# Patient Record
Sex: MALE | Race: WHITE | NOT HISPANIC OR LATINO | Employment: OTHER | URBAN - METROPOLITAN AREA
[De-identification: names, ages, dates, MRNs, and addresses within clinical notes are randomized per-mention and may not be internally consistent; named-entity substitution may affect disease eponyms.]

---

## 2019-01-01 ENCOUNTER — APPOINTMENT (OUTPATIENT)
Dept: RADIOLOGY | Facility: MEDICAL CENTER | Age: 69
DRG: 253 | End: 2019-01-01
Attending: HOSPITALIST
Payer: MEDICARE

## 2019-01-01 ENCOUNTER — APPOINTMENT (OUTPATIENT)
Dept: RADIOLOGY | Facility: MEDICAL CENTER | Age: 69
DRG: 253 | End: 2019-01-01
Attending: EMERGENCY MEDICINE
Payer: MEDICARE

## 2019-01-01 ENCOUNTER — HOSPITAL ENCOUNTER (INPATIENT)
Facility: MEDICAL CENTER | Age: 69
LOS: 8 days | DRG: 253 | End: 2020-01-03
Attending: EMERGENCY MEDICINE | Admitting: HOSPITALIST
Payer: MEDICARE

## 2019-01-01 ENCOUNTER — APPOINTMENT (OUTPATIENT)
Dept: RADIOLOGY | Facility: MEDICAL CENTER | Age: 69
DRG: 253 | End: 2019-01-01
Attending: SURGERY
Payer: MEDICARE

## 2019-01-01 ENCOUNTER — ANESTHESIA EVENT (OUTPATIENT)
Dept: SURGERY | Facility: MEDICAL CENTER | Age: 69
DRG: 253 | End: 2019-01-01
Payer: MEDICARE

## 2019-01-01 ENCOUNTER — ANESTHESIA (OUTPATIENT)
Dept: SURGERY | Facility: MEDICAL CENTER | Age: 69
DRG: 253 | End: 2019-01-01
Payer: MEDICARE

## 2019-01-01 DIAGNOSIS — I70.90 ARTERIAL OCCLUSION DUE TO ARTERIOSCLEROSIS: ICD-10-CM

## 2019-01-01 DIAGNOSIS — E87.1 HYPONATREMIA: ICD-10-CM

## 2019-01-01 DIAGNOSIS — N19 RENAL FAILURE, UNSPECIFIED CHRONICITY: ICD-10-CM

## 2019-01-01 DIAGNOSIS — I73.9 PVD (PERIPHERAL VASCULAR DISEASE) (HCC): ICD-10-CM

## 2019-01-01 DIAGNOSIS — I70.229 CRITICAL LOWER LIMB ISCHEMIA (HCC): ICD-10-CM

## 2019-01-01 DIAGNOSIS — I70.90 ARTERIAL OCCLUSION: ICD-10-CM

## 2019-01-01 LAB
ALBUMIN SERPL BCP-MCNC: 3.1 G/DL (ref 3.2–4.9)
ALBUMIN SERPL BCP-MCNC: 3.2 G/DL (ref 3.2–4.9)
ALBUMIN SERPL BCP-MCNC: 3.4 G/DL (ref 3.2–4.9)
ALBUMIN SERPL BCP-MCNC: 4.2 G/DL (ref 3.2–4.9)
ALBUMIN/GLOB SERPL: 1 G/DL
ALBUMIN/GLOB SERPL: 1.1 G/DL
ALP SERPL-CCNC: 107 U/L (ref 30–99)
ALP SERPL-CCNC: 119 U/L (ref 30–99)
ALP SERPL-CCNC: 82 U/L (ref 30–99)
ALP SERPL-CCNC: 93 U/L (ref 30–99)
ALT SERPL-CCNC: 6 U/L (ref 2–50)
ALT SERPL-CCNC: 7 U/L (ref 2–50)
ALT SERPL-CCNC: 7 U/L (ref 2–50)
ALT SERPL-CCNC: 8 U/L (ref 2–50)
ANION GAP SERPL CALC-SCNC: 14 MMOL/L (ref 0–11.9)
ANION GAP SERPL CALC-SCNC: 5 MMOL/L (ref 0–11.9)
ANION GAP SERPL CALC-SCNC: 5 MMOL/L (ref 0–11.9)
ANION GAP SERPL CALC-SCNC: 7 MMOL/L (ref 0–11.9)
ANION GAP SERPL CALC-SCNC: 8 MMOL/L (ref 0–11.9)
ANION GAP SERPL CALC-SCNC: 9 MMOL/L (ref 0–11.9)
APPEARANCE UR: CLEAR
APTT PPP: 170.6 SEC (ref 24.7–36)
APTT PPP: 171.8 SEC (ref 24.7–36)
APTT PPP: 42 SEC (ref 24.7–36)
AST SERPL-CCNC: 12 U/L (ref 12–45)
AST SERPL-CCNC: 14 U/L (ref 12–45)
AST SERPL-CCNC: 14 U/L (ref 12–45)
AST SERPL-CCNC: 15 U/L (ref 12–45)
BACTERIA #/AREA URNS HPF: NEGATIVE /HPF
BASOPHILS # BLD AUTO: 0.2 % (ref 0–1.8)
BASOPHILS # BLD AUTO: 0.6 % (ref 0–1.8)
BASOPHILS # BLD AUTO: 0.6 % (ref 0–1.8)
BASOPHILS # BLD: 0.03 K/UL (ref 0–0.12)
BASOPHILS # BLD: 0.04 K/UL (ref 0–0.12)
BASOPHILS # BLD: 0.07 K/UL (ref 0–0.12)
BILIRUB SERPL-MCNC: 0.3 MG/DL (ref 0.1–1.5)
BILIRUB SERPL-MCNC: 0.4 MG/DL (ref 0.1–1.5)
BILIRUB SERPL-MCNC: 0.5 MG/DL (ref 0.1–1.5)
BILIRUB SERPL-MCNC: 0.5 MG/DL (ref 0.1–1.5)
BILIRUB UR QL STRIP.AUTO: NEGATIVE
BUN SERPL-MCNC: 15 MG/DL (ref 8–22)
BUN SERPL-MCNC: 19 MG/DL (ref 8–22)
BUN SERPL-MCNC: 29 MG/DL (ref 8–22)
BUN SERPL-MCNC: 42 MG/DL (ref 8–22)
BUN SERPL-MCNC: 55 MG/DL (ref 8–22)
BUN SERPL-MCNC: 60 MG/DL (ref 8–22)
CALCIUM SERPL-MCNC: 8 MG/DL (ref 8.5–10.5)
CALCIUM SERPL-MCNC: 8.4 MG/DL (ref 8.5–10.5)
CALCIUM SERPL-MCNC: 8.5 MG/DL (ref 8.5–10.5)
CALCIUM SERPL-MCNC: 8.6 MG/DL (ref 8.5–10.5)
CALCIUM SERPL-MCNC: 8.6 MG/DL (ref 8.5–10.5)
CALCIUM SERPL-MCNC: 9.3 MG/DL (ref 8.5–10.5)
CHLORIDE SERPL-SCNC: 101 MMOL/L (ref 96–112)
CHLORIDE SERPL-SCNC: 105 MMOL/L (ref 96–112)
CHLORIDE SERPL-SCNC: 107 MMOL/L (ref 96–112)
CHLORIDE SERPL-SCNC: 107 MMOL/L (ref 96–112)
CHLORIDE SERPL-SCNC: 95 MMOL/L (ref 96–112)
CHLORIDE SERPL-SCNC: 99 MMOL/L (ref 96–112)
CHOLEST SERPL-MCNC: 87 MG/DL (ref 100–199)
CK SERPL-CCNC: 86 U/L (ref 0–154)
CO2 SERPL-SCNC: 16 MMOL/L (ref 20–33)
CO2 SERPL-SCNC: 22 MMOL/L (ref 20–33)
CO2 SERPL-SCNC: 23 MMOL/L (ref 20–33)
CO2 SERPL-SCNC: 24 MMOL/L (ref 20–33)
COLOR UR: YELLOW
CREAT SERPL-MCNC: 1.42 MG/DL (ref 0.5–1.4)
CREAT SERPL-MCNC: 1.49 MG/DL (ref 0.5–1.4)
CREAT SERPL-MCNC: 1.62 MG/DL (ref 0.5–1.4)
CREAT SERPL-MCNC: 2.06 MG/DL (ref 0.5–1.4)
CREAT SERPL-MCNC: 2.58 MG/DL (ref 0.5–1.4)
CREAT SERPL-MCNC: 2.99 MG/DL (ref 0.5–1.4)
CREAT UR-MCNC: 93.6 MG/DL
CRP SERPL HS-MCNC: 3.33 MG/DL (ref 0–0.75)
EKG IMPRESSION: NORMAL
EOSINOPHIL # BLD AUTO: 0.02 K/UL (ref 0–0.51)
EOSINOPHIL # BLD AUTO: 0.08 K/UL (ref 0–0.51)
EOSINOPHIL # BLD AUTO: 0.28 K/UL (ref 0–0.51)
EOSINOPHIL NFR BLD: 0.3 % (ref 0–6.9)
EOSINOPHIL NFR BLD: 1.3 % (ref 0–6.9)
EOSINOPHIL NFR BLD: 2.4 % (ref 0–6.9)
EPI CELLS #/AREA URNS HPF: NEGATIVE /HPF
ERYTHROCYTE [DISTWIDTH] IN BLOOD BY AUTOMATED COUNT: 44.6 FL (ref 35.9–50)
ERYTHROCYTE [DISTWIDTH] IN BLOOD BY AUTOMATED COUNT: 46.3 FL (ref 35.9–50)
ERYTHROCYTE [DISTWIDTH] IN BLOOD BY AUTOMATED COUNT: 46.5 FL (ref 35.9–50)
ERYTHROCYTE [DISTWIDTH] IN BLOOD BY AUTOMATED COUNT: 46.8 FL (ref 35.9–50)
ERYTHROCYTE [DISTWIDTH] IN BLOOD BY AUTOMATED COUNT: 46.8 FL (ref 35.9–50)
ERYTHROCYTE [DISTWIDTH] IN BLOOD BY AUTOMATED COUNT: 47.2 FL (ref 35.9–50)
ERYTHROCYTE [DISTWIDTH] IN BLOOD BY AUTOMATED COUNT: 48.6 FL (ref 35.9–50)
ERYTHROCYTE [SEDIMENTATION RATE] IN BLOOD BY WESTERGREN METHOD: 67 MM/HOUR (ref 0–20)
GLOBULIN SER CALC-MCNC: 2.9 G/DL (ref 1.9–3.5)
GLOBULIN SER CALC-MCNC: 3.1 G/DL (ref 1.9–3.5)
GLOBULIN SER CALC-MCNC: 3.1 G/DL (ref 1.9–3.5)
GLOBULIN SER CALC-MCNC: 4 G/DL (ref 1.9–3.5)
GLUCOSE BLD-MCNC: 82 MG/DL (ref 65–99)
GLUCOSE SERPL-MCNC: 124 MG/DL (ref 65–99)
GLUCOSE SERPL-MCNC: 347 MG/DL (ref 65–99)
GLUCOSE SERPL-MCNC: 83 MG/DL (ref 65–99)
GLUCOSE SERPL-MCNC: 85 MG/DL (ref 65–99)
GLUCOSE SERPL-MCNC: 85 MG/DL (ref 65–99)
GLUCOSE SERPL-MCNC: 92 MG/DL (ref 65–99)
GLUCOSE UR STRIP.AUTO-MCNC: NEGATIVE MG/DL
HCT VFR BLD AUTO: 27.4 % (ref 42–52)
HCT VFR BLD AUTO: 27.4 % (ref 42–52)
HCT VFR BLD AUTO: 30.1 % (ref 42–52)
HCT VFR BLD AUTO: 31.1 % (ref 42–52)
HCT VFR BLD AUTO: 31.1 % (ref 42–52)
HCT VFR BLD AUTO: 32.5 % (ref 42–52)
HCT VFR BLD AUTO: 39.6 % (ref 42–52)
HDLC SERPL-MCNC: 26 MG/DL
HGB BLD-MCNC: 10.3 G/DL (ref 14–18)
HGB BLD-MCNC: 10.6 G/DL (ref 14–18)
HGB BLD-MCNC: 10.6 G/DL (ref 14–18)
HGB BLD-MCNC: 13.3 G/DL (ref 14–18)
HGB BLD-MCNC: 8.8 G/DL (ref 14–18)
HGB BLD-MCNC: 9.2 G/DL (ref 14–18)
HGB BLD-MCNC: 9.6 G/DL (ref 14–18)
HYALINE CASTS #/AREA URNS LPF: ABNORMAL /LPF
IMM GRANULOCYTES # BLD AUTO: 0.01 K/UL (ref 0–0.11)
IMM GRANULOCYTES # BLD AUTO: 0.01 K/UL (ref 0–0.11)
IMM GRANULOCYTES # BLD AUTO: 0.04 K/UL (ref 0–0.11)
IMM GRANULOCYTES NFR BLD AUTO: 0 % (ref 0–0.9)
IMM GRANULOCYTES NFR BLD AUTO: 0.2 % (ref 0–0.9)
IMM GRANULOCYTES NFR BLD AUTO: 0.3 % (ref 0–0.9)
INR PPP: 1 (ref 0.87–1.13)
KETONES UR STRIP.AUTO-MCNC: ABNORMAL MG/DL
LDLC SERPL CALC-MCNC: 41 MG/DL
LEUKOCYTE ESTERASE UR QL STRIP.AUTO: NEGATIVE
LYMPHOCYTES # BLD AUTO: 1.06 K/UL (ref 1–4.8)
LYMPHOCYTES # BLD AUTO: 1.39 K/UL (ref 1–4.8)
LYMPHOCYTES # BLD AUTO: 1.93 K/UL (ref 1–4.8)
LYMPHOCYTES NFR BLD: 16.5 % (ref 22–41)
LYMPHOCYTES NFR BLD: 17.8 % (ref 22–41)
LYMPHOCYTES NFR BLD: 22 % (ref 22–41)
MAGNESIUM SERPL-MCNC: 2.3 MG/DL (ref 1.5–2.5)
MCH RBC QN AUTO: 30.1 PG (ref 27–33)
MCH RBC QN AUTO: 30.3 PG (ref 27–33)
MCH RBC QN AUTO: 30.4 PG (ref 27–33)
MCH RBC QN AUTO: 30.6 PG (ref 27–33)
MCH RBC QN AUTO: 30.8 PG (ref 27–33)
MCHC RBC AUTO-ENTMCNC: 31.9 G/DL (ref 33.7–35.3)
MCHC RBC AUTO-ENTMCNC: 32.1 G/DL (ref 33.7–35.3)
MCHC RBC AUTO-ENTMCNC: 32.6 G/DL (ref 33.7–35.3)
MCHC RBC AUTO-ENTMCNC: 33.1 G/DL (ref 33.7–35.3)
MCHC RBC AUTO-ENTMCNC: 33.6 G/DL (ref 33.7–35.3)
MCHC RBC AUTO-ENTMCNC: 33.6 G/DL (ref 33.7–35.3)
MCHC RBC AUTO-ENTMCNC: 34.1 G/DL (ref 33.7–35.3)
MCV RBC AUTO: 89.9 FL (ref 81.4–97.8)
MCV RBC AUTO: 90.6 FL (ref 81.4–97.8)
MCV RBC AUTO: 91.5 FL (ref 81.4–97.8)
MCV RBC AUTO: 91.6 FL (ref 81.4–97.8)
MCV RBC AUTO: 92.3 FL (ref 81.4–97.8)
MCV RBC AUTO: 94.8 FL (ref 81.4–97.8)
MCV RBC AUTO: 95.3 FL (ref 81.4–97.8)
MICRO URNS: ABNORMAL
MONOCYTES # BLD AUTO: 0.63 K/UL (ref 0–0.85)
MONOCYTES # BLD AUTO: 0.81 K/UL (ref 0–0.85)
MONOCYTES # BLD AUTO: 1.19 K/UL (ref 0–0.85)
MONOCYTES NFR BLD AUTO: 10.2 % (ref 0–13.4)
MONOCYTES NFR BLD AUTO: 10.6 % (ref 0–13.4)
MONOCYTES NFR BLD AUTO: 12.8 % (ref 0–13.4)
NEUTROPHILS # BLD AUTO: 3.98 K/UL (ref 1.82–7.42)
NEUTROPHILS # BLD AUTO: 4.19 K/UL (ref 1.82–7.42)
NEUTROPHILS # BLD AUTO: 8.17 K/UL (ref 1.82–7.42)
NEUTROPHILS NFR BLD: 63.1 % (ref 44–72)
NEUTROPHILS NFR BLD: 70 % (ref 44–72)
NEUTROPHILS NFR BLD: 70.6 % (ref 44–72)
NITRITE UR QL STRIP.AUTO: NEGATIVE
NRBC # BLD AUTO: 0 K/UL
NRBC BLD-RTO: 0 /100 WBC
PH UR STRIP.AUTO: 5 [PH] (ref 5–8)
PHOSPHATE SERPL-MCNC: 5.1 MG/DL (ref 2.5–4.5)
PLATELET # BLD AUTO: 182 K/UL (ref 164–446)
PLATELET # BLD AUTO: 192 K/UL (ref 164–446)
PLATELET # BLD AUTO: 196 K/UL (ref 164–446)
PLATELET # BLD AUTO: 203 K/UL (ref 164–446)
PLATELET # BLD AUTO: 204 K/UL (ref 164–446)
PLATELET # BLD AUTO: 207 K/UL (ref 164–446)
PLATELET # BLD AUTO: 332 K/UL (ref 164–446)
PMV BLD AUTO: 10 FL (ref 9–12.9)
PMV BLD AUTO: 10 FL (ref 9–12.9)
PMV BLD AUTO: 10.1 FL (ref 9–12.9)
PMV BLD AUTO: 10.2 FL (ref 9–12.9)
PMV BLD AUTO: 10.2 FL (ref 9–12.9)
PMV BLD AUTO: 10.3 FL (ref 9–12.9)
PMV BLD AUTO: 10.4 FL (ref 9–12.9)
POTASSIUM SERPL-SCNC: 4.1 MMOL/L (ref 3.6–5.5)
POTASSIUM SERPL-SCNC: 4.1 MMOL/L (ref 3.6–5.5)
POTASSIUM SERPL-SCNC: 4.2 MMOL/L (ref 3.6–5.5)
POTASSIUM SERPL-SCNC: 4.3 MMOL/L (ref 3.6–5.5)
POTASSIUM SERPL-SCNC: 4.3 MMOL/L (ref 3.6–5.5)
POTASSIUM SERPL-SCNC: 5 MMOL/L (ref 3.6–5.5)
PROT SERPL-MCNC: 6.1 G/DL (ref 6–8.2)
PROT SERPL-MCNC: 6.2 G/DL (ref 6–8.2)
PROT SERPL-MCNC: 6.5 G/DL (ref 6–8.2)
PROT SERPL-MCNC: 8.2 G/DL (ref 6–8.2)
PROT UR QL STRIP: 100 MG/DL
PROTHROMBIN TIME: 13.4 SEC (ref 12–14.6)
RBC # BLD AUTO: 2.89 M/UL (ref 4.7–6.1)
RBC # BLD AUTO: 2.99 M/UL (ref 4.7–6.1)
RBC # BLD AUTO: 3.16 M/UL (ref 4.7–6.1)
RBC # BLD AUTO: 3.4 M/UL (ref 4.7–6.1)
RBC # BLD AUTO: 3.46 M/UL (ref 4.7–6.1)
RBC # BLD AUTO: 3.52 M/UL (ref 4.7–6.1)
RBC # BLD AUTO: 4.37 M/UL (ref 4.7–6.1)
RBC # URNS HPF: ABNORMAL /HPF
RBC UR QL AUTO: NEGATIVE
SODIUM SERPL-SCNC: 126 MMOL/L (ref 135–145)
SODIUM SERPL-SCNC: 129 MMOL/L (ref 135–145)
SODIUM SERPL-SCNC: 132 MMOL/L (ref 135–145)
SODIUM SERPL-SCNC: 133 MMOL/L (ref 135–145)
SODIUM SERPL-SCNC: 136 MMOL/L (ref 135–145)
SODIUM SERPL-SCNC: 137 MMOL/L (ref 135–145)
SODIUM UR-SCNC: 56 MMOL/L
SP GR UR STRIP.AUTO: 1.01
TRIGL SERPL-MCNC: 98 MG/DL (ref 0–149)
URATE SERPL-MCNC: 8 MG/DL (ref 2.5–8.3)
UROBILINOGEN UR STRIP.AUTO-MCNC: 1 MG/DL
WBC # BLD AUTO: 11.7 K/UL (ref 4.8–10.8)
WBC # BLD AUTO: 5.4 K/UL (ref 4.8–10.8)
WBC # BLD AUTO: 5.8 K/UL (ref 4.8–10.8)
WBC # BLD AUTO: 5.9 K/UL (ref 4.8–10.8)
WBC # BLD AUTO: 6.1 K/UL (ref 4.8–10.8)
WBC # BLD AUTO: 6.3 K/UL (ref 4.8–10.8)
WBC # BLD AUTO: 7.3 K/UL (ref 4.8–10.8)
WBC #/AREA URNS HPF: ABNORMAL /HPF

## 2019-01-01 PROCEDURE — 700102 HCHG RX REV CODE 250 W/ 637 OVERRIDE(OP): Performed by: HOSPITALIST

## 2019-01-01 PROCEDURE — 04CL0ZZ EXTIRPATION OF MATTER FROM LEFT FEMORAL ARTERY, OPEN APPROACH: ICD-10-PCS | Performed by: SURGERY

## 2019-01-01 PROCEDURE — A9270 NON-COVERED ITEM OR SERVICE: HCPCS | Performed by: NURSE PRACTITIONER

## 2019-01-01 PROCEDURE — 700111 HCHG RX REV CODE 636 W/ 250 OVERRIDE (IP): Performed by: HOSPITALIST

## 2019-01-01 PROCEDURE — 700117 HCHG RX CONTRAST REV CODE 255: Performed by: SURGERY

## 2019-01-01 PROCEDURE — 700102 HCHG RX REV CODE 250 W/ 637 OVERRIDE(OP): Performed by: NURSE PRACTITIONER

## 2019-01-01 PROCEDURE — 85027 COMPLETE CBC AUTOMATED: CPT

## 2019-01-01 PROCEDURE — 160036 HCHG PACU - EA ADDL 30 MINS PHASE I: Performed by: SURGERY

## 2019-01-01 PROCEDURE — 501445 HCHG STAPLER, SKIN DISP: Performed by: SURGERY

## 2019-01-01 PROCEDURE — C1769 GUIDE WIRE: HCPCS | Performed by: SURGERY

## 2019-01-01 PROCEDURE — C1894 INTRO/SHEATH, NON-LASER: HCPCS | Performed by: SURGERY

## 2019-01-01 PROCEDURE — 99223 1ST HOSP IP/OBS HIGH 75: CPT | Mod: AI | Performed by: HOSPITALIST

## 2019-01-01 PROCEDURE — 99232 SBSQ HOSP IP/OBS MODERATE 35: CPT | Performed by: INTERNAL MEDICINE

## 2019-01-01 PROCEDURE — C1725 CATH, TRANSLUMIN NON-LASER: HCPCS | Performed by: SURGERY

## 2019-01-01 PROCEDURE — A9270 NON-COVERED ITEM OR SERVICE: HCPCS | Performed by: HOSPITALIST

## 2019-01-01 PROCEDURE — 85610 PROTHROMBIN TIME: CPT

## 2019-01-01 PROCEDURE — 700105 HCHG RX REV CODE 258: Performed by: HOSPITALIST

## 2019-01-01 PROCEDURE — 76775 US EXAM ABDO BACK WALL LIM: CPT

## 2019-01-01 PROCEDURE — 160035 HCHG PACU - 1ST 60 MINS PHASE I: Performed by: SURGERY

## 2019-01-01 PROCEDURE — 36415 COLL VENOUS BLD VENIPUNCTURE: CPT

## 2019-01-01 PROCEDURE — C1757 CATH, THROMBECTOMY/EMBOLECT: HCPCS | Performed by: SURGERY

## 2019-01-01 PROCEDURE — 160009 HCHG ANES TIME/MIN: Performed by: SURGERY

## 2019-01-01 PROCEDURE — 84100 ASSAY OF PHOSPHORUS: CPT

## 2019-01-01 PROCEDURE — 93010 ELECTROCARDIOGRAM REPORT: CPT | Performed by: INTERNAL MEDICINE

## 2019-01-01 PROCEDURE — 82570 ASSAY OF URINE CREATININE: CPT

## 2019-01-01 PROCEDURE — 82962 GLUCOSE BLOOD TEST: CPT

## 2019-01-01 PROCEDURE — 502240 HCHG MISC OR SUPPLY RC 0272: Performed by: SURGERY

## 2019-01-01 PROCEDURE — 80048 BASIC METABOLIC PNL TOTAL CA: CPT

## 2019-01-01 PROCEDURE — 93922 UPR/L XTREMITY ART 2 LEVELS: CPT

## 2019-01-01 PROCEDURE — B41C1ZZ FLUOROSCOPY OF PELVIC ARTERIES USING LOW OSMOLAR CONTRAST: ICD-10-PCS | Performed by: SURGERY

## 2019-01-01 PROCEDURE — 80053 COMPREHEN METABOLIC PANEL: CPT

## 2019-01-01 PROCEDURE — 700111 HCHG RX REV CODE 636 W/ 250 OVERRIDE (IP): Performed by: ANESTHESIOLOGY

## 2019-01-01 PROCEDURE — 700102 HCHG RX REV CODE 250 W/ 637 OVERRIDE(OP): Performed by: INTERNAL MEDICINE

## 2019-01-01 PROCEDURE — 700105 HCHG RX REV CODE 258: Performed by: INTERNAL MEDICINE

## 2019-01-01 PROCEDURE — 700101 HCHG RX REV CODE 250: Performed by: HOSPITALIST

## 2019-01-01 PROCEDURE — 84550 ASSAY OF BLOOD/URIC ACID: CPT

## 2019-01-01 PROCEDURE — 86140 C-REACTIVE PROTEIN: CPT

## 2019-01-01 PROCEDURE — C1768 GRAFT, VASCULAR: HCPCS | Performed by: SURGERY

## 2019-01-01 PROCEDURE — 85025 COMPLETE CBC W/AUTO DIFF WBC: CPT

## 2019-01-01 PROCEDURE — 99285 EMERGENCY DEPT VISIT HI MDM: CPT

## 2019-01-01 PROCEDURE — 93926 LOWER EXTREMITY STUDY: CPT | Mod: LT

## 2019-01-01 PROCEDURE — 700111 HCHG RX REV CODE 636 W/ 250 OVERRIDE (IP): Performed by: EMERGENCY MEDICINE

## 2019-01-01 PROCEDURE — 85652 RBC SED RATE AUTOMATED: CPT

## 2019-01-01 PROCEDURE — 84300 ASSAY OF URINE SODIUM: CPT

## 2019-01-01 PROCEDURE — A9270 NON-COVERED ITEM OR SERVICE: HCPCS | Performed by: INTERNAL MEDICINE

## 2019-01-01 PROCEDURE — 360976 HOYER LARGE SLING UP TO 300LBS: Performed by: HOSPITALIST

## 2019-01-01 PROCEDURE — 700102 HCHG RX REV CODE 250 W/ 637 OVERRIDE(OP): Performed by: ANESTHESIOLOGY

## 2019-01-01 PROCEDURE — 770006 HCHG ROOM/CARE - MED/SURG/GYN SEMI*

## 2019-01-01 PROCEDURE — 51798 US URINE CAPACITY MEASURE: CPT

## 2019-01-01 PROCEDURE — 700101 HCHG RX REV CODE 250: Performed by: ANESTHESIOLOGY

## 2019-01-01 PROCEDURE — A9270 NON-COVERED ITEM OR SERVICE: HCPCS | Performed by: ANESTHESIOLOGY

## 2019-01-01 PROCEDURE — 700101 HCHG RX REV CODE 250: Performed by: SURGERY

## 2019-01-01 PROCEDURE — 110454 HCHG SHELL REV 250: Performed by: SURGERY

## 2019-01-01 PROCEDURE — 96375 TX/PRO/DX INJ NEW DRUG ADDON: CPT

## 2019-01-01 PROCEDURE — 96365 THER/PROPH/DIAG IV INF INIT: CPT

## 2019-01-01 PROCEDURE — A6402 STERILE GAUZE <= 16 SQ IN: HCPCS | Performed by: SURGERY

## 2019-01-01 PROCEDURE — 500869 HCHG NEEDLE, THINWALL 19GA (COOK): Performed by: SURGERY

## 2019-01-01 PROCEDURE — 96366 THER/PROPH/DIAG IV INF ADDON: CPT

## 2019-01-01 PROCEDURE — 501838 HCHG SUTURE GENERAL: Performed by: SURGERY

## 2019-01-01 PROCEDURE — 160041 HCHG SURGERY MINUTES - EA ADDL 1 MIN LEVEL 4: Performed by: SURGERY

## 2019-01-01 PROCEDURE — 85730 THROMBOPLASTIN TIME PARTIAL: CPT

## 2019-01-01 PROCEDURE — 96376 TX/PRO/DX INJ SAME DRUG ADON: CPT

## 2019-01-01 PROCEDURE — 700111 HCHG RX REV CODE 636 W/ 250 OVERRIDE (IP): Performed by: SURGERY

## 2019-01-01 PROCEDURE — 700105 HCHG RX REV CODE 258: Performed by: EMERGENCY MEDICINE

## 2019-01-01 PROCEDURE — 83735 ASSAY OF MAGNESIUM: CPT

## 2019-01-01 PROCEDURE — 96368 THER/DIAG CONCURRENT INF: CPT

## 2019-01-01 PROCEDURE — 700105 HCHG RX REV CODE 258: Performed by: ANESTHESIOLOGY

## 2019-01-01 PROCEDURE — 93005 ELECTROCARDIOGRAM TRACING: CPT | Performed by: HOSPITALIST

## 2019-01-01 PROCEDURE — 160002 HCHG RECOVERY MINUTES (STAT): Performed by: SURGERY

## 2019-01-01 PROCEDURE — 81001 URINALYSIS AUTO W/SCOPE: CPT

## 2019-01-01 PROCEDURE — 80061 LIPID PANEL: CPT

## 2019-01-01 PROCEDURE — 160048 HCHG OR STATISTICAL LEVEL 1-5: Performed by: SURGERY

## 2019-01-01 PROCEDURE — 82550 ASSAY OF CK (CPK): CPT

## 2019-01-01 PROCEDURE — 160029 HCHG SURGERY MINUTES - 1ST 30 MINS LEVEL 4: Performed by: SURGERY

## 2019-01-01 PROCEDURE — 04UL0KZ SUPPLEMENT LEFT FEMORAL ARTERY WITH NONAUTOLOGOUS TISSUE SUBSTITUTE, OPEN APPROACH: ICD-10-PCS | Performed by: SURGERY

## 2019-01-01 DEVICE — PATCH .8X8CM XENOSURE BIOLOGIC VASCULAR---ORDER IN MULTIPLES OF 5---: Type: IMPLANTABLE DEVICE | Site: GROIN | Status: FUNCTIONAL

## 2019-01-01 RX ORDER — HYDROCHLOROTHIAZIDE 25 MG/1
25 TABLET ORAL DAILY
Status: ON HOLD | COMMUNITY
Start: 2019-11-06 | End: 2020-01-01

## 2019-01-01 RX ORDER — OXYCODONE HYDROCHLORIDE 5 MG/1
5 TABLET ORAL
Status: DISCONTINUED | OUTPATIENT
Start: 2019-01-01 | End: 2019-01-01

## 2019-01-01 RX ORDER — AMOXICILLIN 250 MG
2 CAPSULE ORAL 2 TIMES DAILY
Status: DISCONTINUED | OUTPATIENT
Start: 2019-01-01 | End: 2020-01-01 | Stop reason: HOSPADM

## 2019-01-01 RX ORDER — ONDANSETRON 2 MG/ML
4 INJECTION INTRAMUSCULAR; INTRAVENOUS
Status: DISCONTINUED | OUTPATIENT
Start: 2019-01-01 | End: 2019-01-01 | Stop reason: HOSPADM

## 2019-01-01 RX ORDER — LABETALOL HYDROCHLORIDE 5 MG/ML
5 INJECTION, SOLUTION INTRAVENOUS
Status: DISCONTINUED | OUTPATIENT
Start: 2019-01-01 | End: 2019-01-01 | Stop reason: HOSPADM

## 2019-01-01 RX ORDER — SODIUM CHLORIDE 9 MG/ML
1000 INJECTION, SOLUTION INTRAVENOUS ONCE
Status: COMPLETED | OUTPATIENT
Start: 2019-01-01 | End: 2019-01-01

## 2019-01-01 RX ORDER — OXYCODONE HCL 5 MG/5 ML
10 SOLUTION, ORAL ORAL
Status: COMPLETED | OUTPATIENT
Start: 2019-01-01 | End: 2019-01-01

## 2019-01-01 RX ORDER — PROTAMINE SULFATE 10 MG/ML
INJECTION, SOLUTION INTRAVENOUS PRN
Status: DISCONTINUED | OUTPATIENT
Start: 2019-01-01 | End: 2019-01-01 | Stop reason: SURG

## 2019-01-01 RX ORDER — HEPARIN SODIUM,PORCINE 1000/ML
VIAL (ML) INJECTION
Status: DISCONTINUED | OUTPATIENT
Start: 2019-01-01 | End: 2019-01-01 | Stop reason: HOSPADM

## 2019-01-01 RX ORDER — POLYETHYLENE GLYCOL 3350 17 G/17G
1 POWDER, FOR SOLUTION ORAL
Status: DISCONTINUED | OUTPATIENT
Start: 2019-01-01 | End: 2020-01-01 | Stop reason: HOSPADM

## 2019-01-01 RX ORDER — HEPARIN SODIUM 1000 [USP'U]/ML
2600 INJECTION, SOLUTION INTRAVENOUS; SUBCUTANEOUS PRN
Status: DISCONTINUED | OUTPATIENT
Start: 2019-01-01 | End: 2019-01-01

## 2019-01-01 RX ORDER — HEPARIN SODIUM 1000 [USP'U]/ML
5000 INJECTION, SOLUTION INTRAVENOUS; SUBCUTANEOUS ONCE
Status: COMPLETED | OUTPATIENT
Start: 2019-01-01 | End: 2019-01-01

## 2019-01-01 RX ORDER — LISINOPRIL 40 MG/1
40 TABLET ORAL DAILY
COMMUNITY
Start: 2019-11-06 | End: 2020-01-01

## 2019-01-01 RX ORDER — TAMSULOSIN HYDROCHLORIDE 0.4 MG/1
0.4 CAPSULE ORAL
Status: DISCONTINUED | OUTPATIENT
Start: 2019-01-01 | End: 2020-01-01 | Stop reason: HOSPADM

## 2019-01-01 RX ORDER — OXYCODONE HYDROCHLORIDE 5 MG/1
2.5 TABLET ORAL
Status: DISCONTINUED | OUTPATIENT
Start: 2019-01-01 | End: 2019-01-01

## 2019-01-01 RX ORDER — PROCHLORPERAZINE EDISYLATE 5 MG/ML
10 INJECTION INTRAMUSCULAR; INTRAVENOUS EVERY 6 HOURS PRN
Status: DISCONTINUED | OUTPATIENT
Start: 2019-01-01 | End: 2020-01-01 | Stop reason: HOSPADM

## 2019-01-01 RX ORDER — DIPHENHYDRAMINE HYDROCHLORIDE 50 MG/ML
12.5 INJECTION INTRAMUSCULAR; INTRAVENOUS
Status: DISCONTINUED | OUTPATIENT
Start: 2019-01-01 | End: 2019-01-01 | Stop reason: HOSPADM

## 2019-01-01 RX ORDER — DEXAMETHASONE SODIUM PHOSPHATE 4 MG/ML
INJECTION, SOLUTION INTRA-ARTICULAR; INTRALESIONAL; INTRAMUSCULAR; INTRAVENOUS; SOFT TISSUE PRN
Status: DISCONTINUED | OUTPATIENT
Start: 2019-01-01 | End: 2019-01-01 | Stop reason: SURG

## 2019-01-01 RX ORDER — OXYCODONE HCL 5 MG/5 ML
5 SOLUTION, ORAL ORAL
Status: COMPLETED | OUTPATIENT
Start: 2019-01-01 | End: 2019-01-01

## 2019-01-01 RX ORDER — ONDANSETRON 4 MG/1
4 TABLET, ORALLY DISINTEGRATING ORAL EVERY 4 HOURS PRN
Status: DISCONTINUED | OUTPATIENT
Start: 2019-01-01 | End: 2020-01-01 | Stop reason: HOSPADM

## 2019-01-01 RX ORDER — GABAPENTIN 300 MG/1
300 CAPSULE ORAL
Status: DISCONTINUED | OUTPATIENT
Start: 2019-01-01 | End: 2019-01-01

## 2019-01-01 RX ORDER — METOPROLOL TARTRATE 1 MG/ML
1 INJECTION, SOLUTION INTRAVENOUS
Status: DISCONTINUED | OUTPATIENT
Start: 2019-01-01 | End: 2019-01-01 | Stop reason: HOSPADM

## 2019-01-01 RX ORDER — MORPHINE SULFATE 4 MG/ML
4 INJECTION, SOLUTION INTRAMUSCULAR; INTRAVENOUS ONCE
Status: COMPLETED | OUTPATIENT
Start: 2019-01-01 | End: 2019-01-01

## 2019-01-01 RX ORDER — METOPROLOL SUCCINATE 50 MG/1
50 TABLET, EXTENDED RELEASE ORAL DAILY
COMMUNITY
Start: 2019-11-14

## 2019-01-01 RX ORDER — HYDROMORPHONE HYDROCHLORIDE 1 MG/ML
0.2 INJECTION, SOLUTION INTRAMUSCULAR; INTRAVENOUS; SUBCUTANEOUS
Status: DISCONTINUED | OUTPATIENT
Start: 2019-01-01 | End: 2019-01-01 | Stop reason: HOSPADM

## 2019-01-01 RX ORDER — BISACODYL 10 MG
10 SUPPOSITORY, RECTAL RECTAL
Status: DISCONTINUED | OUTPATIENT
Start: 2019-01-01 | End: 2020-01-01 | Stop reason: HOSPADM

## 2019-01-01 RX ORDER — HEPARIN SODIUM 5000 [USP'U]/100ML
INJECTION, SOLUTION INTRAVENOUS CONTINUOUS
Status: DISCONTINUED | OUTPATIENT
Start: 2019-01-01 | End: 2019-01-01

## 2019-01-01 RX ORDER — OXYCODONE HYDROCHLORIDE 10 MG/1
10 TABLET ORAL EVERY 4 HOURS PRN
Status: DISCONTINUED | OUTPATIENT
Start: 2019-01-01 | End: 2020-01-01 | Stop reason: HOSPADM

## 2019-01-01 RX ORDER — ONDANSETRON 2 MG/ML
4 INJECTION INTRAMUSCULAR; INTRAVENOUS EVERY 4 HOURS PRN
Status: DISCONTINUED | OUTPATIENT
Start: 2019-01-01 | End: 2020-01-01

## 2019-01-01 RX ORDER — LABETALOL HYDROCHLORIDE 5 MG/ML
10 INJECTION, SOLUTION INTRAVENOUS EVERY 6 HOURS PRN
Status: DISCONTINUED | OUTPATIENT
Start: 2019-01-01 | End: 2020-01-01

## 2019-01-01 RX ORDER — GABAPENTIN 300 MG/1
300 CAPSULE ORAL 3 TIMES DAILY
COMMUNITY
Start: 2019-12-08

## 2019-01-01 RX ORDER — ONDANSETRON 2 MG/ML
4 INJECTION INTRAMUSCULAR; INTRAVENOUS ONCE
Status: COMPLETED | OUTPATIENT
Start: 2019-01-01 | End: 2019-01-01

## 2019-01-01 RX ORDER — METOPROLOL SUCCINATE 50 MG/1
50 TABLET, EXTENDED RELEASE ORAL DAILY
Status: DISCONTINUED | OUTPATIENT
Start: 2019-01-01 | End: 2020-01-01 | Stop reason: HOSPADM

## 2019-01-01 RX ORDER — SODIUM CHLORIDE, SODIUM LACTATE, POTASSIUM CHLORIDE, CALCIUM CHLORIDE 600; 310; 30; 20 MG/100ML; MG/100ML; MG/100ML; MG/100ML
INJECTION, SOLUTION INTRAVENOUS
Status: DISCONTINUED | OUTPATIENT
Start: 2019-01-01 | End: 2019-01-01 | Stop reason: SURG

## 2019-01-01 RX ORDER — HALOPERIDOL 5 MG/ML
1 INJECTION INTRAMUSCULAR
Status: DISCONTINUED | OUTPATIENT
Start: 2019-01-01 | End: 2019-01-01 | Stop reason: HOSPADM

## 2019-01-01 RX ORDER — MORPHINE SULFATE 4 MG/ML
4 INJECTION, SOLUTION INTRAMUSCULAR; INTRAVENOUS
Status: COMPLETED | OUTPATIENT
Start: 2019-01-01 | End: 2019-01-01

## 2019-01-01 RX ORDER — SODIUM CHLORIDE 9 MG/ML
INJECTION, SOLUTION INTRAVENOUS CONTINUOUS
Status: DISCONTINUED | OUTPATIENT
Start: 2019-01-01 | End: 2020-01-01 | Stop reason: HOSPADM

## 2019-01-01 RX ORDER — GABAPENTIN 400 MG/1
400 CAPSULE ORAL 2 TIMES DAILY
Status: DISCONTINUED | OUTPATIENT
Start: 2019-01-01 | End: 2020-01-01 | Stop reason: HOSPADM

## 2019-01-01 RX ORDER — ROCURONIUM BROMIDE 10 MG/ML
INJECTION, SOLUTION INTRAVENOUS PRN
Status: DISCONTINUED | OUTPATIENT
Start: 2019-01-01 | End: 2019-01-01 | Stop reason: SURG

## 2019-01-01 RX ORDER — MEPERIDINE HYDROCHLORIDE 25 MG/ML
6.25 INJECTION INTRAMUSCULAR; INTRAVENOUS; SUBCUTANEOUS
Status: DISCONTINUED | OUTPATIENT
Start: 2019-01-01 | End: 2019-01-01 | Stop reason: HOSPADM

## 2019-01-01 RX ORDER — ACETAMINOPHEN 325 MG/1
650 TABLET ORAL EVERY 6 HOURS PRN
Status: DISCONTINUED | OUTPATIENT
Start: 2019-01-01 | End: 2020-01-01 | Stop reason: HOSPADM

## 2019-01-01 RX ORDER — HYDROMORPHONE HYDROCHLORIDE 1 MG/ML
0.1 INJECTION, SOLUTION INTRAMUSCULAR; INTRAVENOUS; SUBCUTANEOUS
Status: DISCONTINUED | OUTPATIENT
Start: 2019-01-01 | End: 2019-01-01 | Stop reason: HOSPADM

## 2019-01-01 RX ORDER — CEFAZOLIN SODIUM 1 G/3ML
INJECTION, POWDER, FOR SOLUTION INTRAMUSCULAR; INTRAVENOUS PRN
Status: DISCONTINUED | OUTPATIENT
Start: 2019-01-01 | End: 2019-01-01 | Stop reason: SURG

## 2019-01-01 RX ORDER — HYDRALAZINE HYDROCHLORIDE 20 MG/ML
5 INJECTION INTRAMUSCULAR; INTRAVENOUS
Status: DISCONTINUED | OUTPATIENT
Start: 2019-01-01 | End: 2019-01-01 | Stop reason: HOSPADM

## 2019-01-01 RX ORDER — MORPHINE SULFATE 4 MG/ML
2 INJECTION, SOLUTION INTRAMUSCULAR; INTRAVENOUS
Status: DISCONTINUED | OUTPATIENT
Start: 2019-01-01 | End: 2020-01-01 | Stop reason: HOSPADM

## 2019-01-01 RX ORDER — HYDROMORPHONE HYDROCHLORIDE 1 MG/ML
0.4 INJECTION, SOLUTION INTRAMUSCULAR; INTRAVENOUS; SUBCUTANEOUS
Status: DISCONTINUED | OUTPATIENT
Start: 2019-01-01 | End: 2019-01-01 | Stop reason: HOSPADM

## 2019-01-01 RX ADMIN — FENTANYL CITRATE 25 MCG: 0.05 INJECTION, SOLUTION INTRAMUSCULAR; INTRAVENOUS at 13:10

## 2019-01-01 RX ADMIN — SODIUM BICARBONATE: 84 INJECTION, SOLUTION INTRAVENOUS at 04:59

## 2019-01-01 RX ADMIN — DEXAMETHASONE SODIUM PHOSPHATE 8 MG: 4 INJECTION, SOLUTION INTRA-ARTICULAR; INTRALESIONAL; INTRAMUSCULAR; INTRAVENOUS; SOFT TISSUE at 10:38

## 2019-01-01 RX ADMIN — SODIUM BICARBONATE: 84 INJECTION, SOLUTION INTRAVENOUS at 16:17

## 2019-01-01 RX ADMIN — PROCHLORPERAZINE EDISYLATE 10 MG: 5 INJECTION INTRAMUSCULAR; INTRAVENOUS at 22:01

## 2019-01-01 RX ADMIN — OXYCODONE HYDROCHLORIDE 10 MG: 5 SOLUTION ORAL at 12:58

## 2019-01-01 RX ADMIN — MORPHINE SULFATE 2 MG: 4 INJECTION INTRAVENOUS at 05:38

## 2019-01-01 RX ADMIN — TAMSULOSIN HYDROCHLORIDE 0.4 MG: 0.4 CAPSULE ORAL at 08:20

## 2019-01-01 RX ADMIN — OXYCODONE HYDROCHLORIDE 10 MG: 10 TABLET ORAL at 02:35

## 2019-01-01 RX ADMIN — ONDANSETRON 4 MG: 2 INJECTION INTRAMUSCULAR; INTRAVENOUS at 10:38

## 2019-01-01 RX ADMIN — OXYCODONE HYDROCHLORIDE 5 MG: 5 TABLET ORAL at 16:16

## 2019-01-01 RX ADMIN — SODIUM CHLORIDE: 9 INJECTION, SOLUTION INTRAVENOUS at 18:28

## 2019-01-01 RX ADMIN — PROPOFOL 140 MG: 10 INJECTION, EMULSION INTRAVENOUS at 10:37

## 2019-01-01 RX ADMIN — TAMSULOSIN HYDROCHLORIDE 0.4 MG: 0.4 CAPSULE ORAL at 08:53

## 2019-01-01 RX ADMIN — SODIUM CHLORIDE: 9 INJECTION, SOLUTION INTRAVENOUS at 18:43

## 2019-01-01 RX ADMIN — METOPROLOL SUCCINATE 50 MG: 50 TABLET, EXTENDED RELEASE ORAL at 06:24

## 2019-01-01 RX ADMIN — MORPHINE SULFATE 2 MG: 4 INJECTION INTRAVENOUS at 19:32

## 2019-01-01 RX ADMIN — GABAPENTIN 400 MG: 400 CAPSULE ORAL at 17:31

## 2019-01-01 RX ADMIN — FENTANYL CITRATE 25 MCG: 0.05 INJECTION, SOLUTION INTRAMUSCULAR; INTRAVENOUS at 12:59

## 2019-01-01 RX ADMIN — SENNOSIDES AND DOCUSATE SODIUM 2 TABLET: 8.6; 5 TABLET ORAL at 05:37

## 2019-01-01 RX ADMIN — FENTANYL CITRATE 100 MCG: 50 INJECTION, SOLUTION INTRAMUSCULAR; INTRAVENOUS at 10:36

## 2019-01-01 RX ADMIN — ONDANSETRON 4 MG: 2 INJECTION INTRAMUSCULAR; INTRAVENOUS at 11:11

## 2019-01-01 RX ADMIN — OXYCODONE HYDROCHLORIDE 5 MG: 5 TABLET ORAL at 20:10

## 2019-01-01 RX ADMIN — LABETALOL HYDROCHLORIDE 10 MG: 5 INJECTION INTRAVENOUS at 16:12

## 2019-01-01 RX ADMIN — MORPHINE SULFATE 4 MG: 4 INJECTION INTRAVENOUS at 11:50

## 2019-01-01 RX ADMIN — MORPHINE SULFATE 4 MG: 4 INJECTION INTRAVENOUS at 13:55

## 2019-01-01 RX ADMIN — MORPHINE SULFATE 2 MG: 4 INJECTION INTRAVENOUS at 03:13

## 2019-01-01 RX ADMIN — LABETALOL HYDROCHLORIDE 10 MG: 5 INJECTION INTRAVENOUS at 05:30

## 2019-01-01 RX ADMIN — HEPARIN SODIUM 1500 UNITS: 1000 INJECTION, SOLUTION INTRAVENOUS; SUBCUTANEOUS at 11:01

## 2019-01-01 RX ADMIN — OXYCODONE HYDROCHLORIDE 5 MG: 5 TABLET ORAL at 04:05

## 2019-01-01 RX ADMIN — METOPROLOL SUCCINATE 50 MG: 50 TABLET, EXTENDED RELEASE ORAL at 04:42

## 2019-01-01 RX ADMIN — LABETALOL HYDROCHLORIDE 10 MG: 5 INJECTION INTRAVENOUS at 08:20

## 2019-01-01 RX ADMIN — SENNOSIDES AND DOCUSATE SODIUM 2 TABLET: 8.6; 5 TABLET ORAL at 06:18

## 2019-01-01 RX ADMIN — GABAPENTIN 300 MG: 300 CAPSULE ORAL at 20:10

## 2019-01-01 RX ADMIN — SODIUM CHLORIDE 1000 ML: 9 INJECTION, SOLUTION INTRAVENOUS at 13:55

## 2019-01-01 RX ADMIN — METOPROLOL SUCCINATE 50 MG: 50 TABLET, EXTENDED RELEASE ORAL at 06:18

## 2019-01-01 RX ADMIN — ACETAMINOPHEN 650 MG: 325 TABLET, FILM COATED ORAL at 17:25

## 2019-01-01 RX ADMIN — OXYCODONE HYDROCHLORIDE 5 MG: 5 TABLET ORAL at 01:40

## 2019-01-01 RX ADMIN — PROTAMINE SULFATE 40 MG: 10 INJECTION, SOLUTION INTRAVENOUS at 11:58

## 2019-01-01 RX ADMIN — SODIUM CHLORIDE: 9 INJECTION, SOLUTION INTRAVENOUS at 06:24

## 2019-01-01 RX ADMIN — MORPHINE SULFATE 2 MG: 4 INJECTION INTRAVENOUS at 00:49

## 2019-01-01 RX ADMIN — GABAPENTIN 300 MG: 300 CAPSULE ORAL at 20:29

## 2019-01-01 RX ADMIN — OXYCODONE HYDROCHLORIDE 10 MG: 10 TABLET ORAL at 16:30

## 2019-01-01 RX ADMIN — MORPHINE SULFATE 4 MG: 4 INJECTION INTRAVENOUS at 11:11

## 2019-01-01 RX ADMIN — SODIUM CHLORIDE: 9 INJECTION, SOLUTION INTRAVENOUS at 02:35

## 2019-01-01 RX ADMIN — OXYCODONE HYDROCHLORIDE 10 MG: 10 TABLET ORAL at 21:27

## 2019-01-01 RX ADMIN — SENNOSIDES AND DOCUSATE SODIUM 2 TABLET: 8.6; 5 TABLET ORAL at 17:21

## 2019-01-01 RX ADMIN — METOPROLOL SUCCINATE 50 MG: 50 TABLET, EXTENDED RELEASE ORAL at 16:16

## 2019-01-01 RX ADMIN — SENNOSIDES AND DOCUSATE SODIUM 2 TABLET: 8.6; 5 TABLET ORAL at 06:24

## 2019-01-01 RX ADMIN — OXYCODONE HYDROCHLORIDE 5 MG: 5 TABLET ORAL at 16:47

## 2019-01-01 RX ADMIN — OXYCODONE HYDROCHLORIDE 5 MG: 5 TABLET ORAL at 08:20

## 2019-01-01 RX ADMIN — OXYCODONE HYDROCHLORIDE 5 MG: 5 TABLET ORAL at 22:15

## 2019-01-01 RX ADMIN — SENNOSIDES AND DOCUSATE SODIUM 2 TABLET: 8.6; 5 TABLET ORAL at 04:59

## 2019-01-01 RX ADMIN — LABETALOL HYDROCHLORIDE 10 MG: 5 INJECTION INTRAVENOUS at 06:18

## 2019-01-01 RX ADMIN — TAMSULOSIN HYDROCHLORIDE 0.4 MG: 0.4 CAPSULE ORAL at 12:02

## 2019-01-01 RX ADMIN — MORPHINE SULFATE 2 MG: 4 INJECTION INTRAVENOUS at 22:26

## 2019-01-01 RX ADMIN — ONDANSETRON 4 MG: 2 INJECTION INTRAMUSCULAR; INTRAVENOUS at 19:22

## 2019-01-01 RX ADMIN — SENNOSIDES AND DOCUSATE SODIUM 2 TABLET: 8.6; 5 TABLET ORAL at 17:09

## 2019-01-01 RX ADMIN — OXYCODONE HYDROCHLORIDE 5 MG: 5 TABLET ORAL at 12:22

## 2019-01-01 RX ADMIN — ROCURONIUM BROMIDE 40 MG: 10 INJECTION, SOLUTION INTRAVENOUS at 10:40

## 2019-01-01 RX ADMIN — SUGAMMADEX 200 MG: 100 INJECTION, SOLUTION INTRAVENOUS at 12:12

## 2019-01-01 RX ADMIN — OXYCODONE HYDROCHLORIDE 10 MG: 10 TABLET ORAL at 07:21

## 2019-01-01 RX ADMIN — OXYCODONE HYDROCHLORIDE 5 MG: 5 TABLET ORAL at 00:36

## 2019-01-01 RX ADMIN — SODIUM CHLORIDE, POTASSIUM CHLORIDE, SODIUM LACTATE AND CALCIUM CHLORIDE: 600; 310; 30; 20 INJECTION, SOLUTION INTRAVENOUS at 10:29

## 2019-01-01 RX ADMIN — OXYCODONE HYDROCHLORIDE 5 MG: 5 TABLET ORAL at 08:10

## 2019-01-01 RX ADMIN — METOPROLOL SUCCINATE 50 MG: 50 TABLET, EXTENDED RELEASE ORAL at 04:59

## 2019-01-01 RX ADMIN — HEPARIN SODIUM 5000 UNITS: 1000 INJECTION, SOLUTION INTRAVENOUS; SUBCUTANEOUS at 10:49

## 2019-01-01 RX ADMIN — MIDAZOLAM HYDROCHLORIDE 2 MG: 1 INJECTION, SOLUTION INTRAMUSCULAR; INTRAVENOUS at 10:35

## 2019-01-01 RX ADMIN — OXYCODONE HYDROCHLORIDE 5 MG: 5 TABLET ORAL at 04:13

## 2019-01-01 RX ADMIN — OXYCODONE HYDROCHLORIDE 5 MG: 5 TABLET ORAL at 01:06

## 2019-01-01 RX ADMIN — MORPHINE SULFATE 2 MG: 4 INJECTION INTRAVENOUS at 14:22

## 2019-01-01 RX ADMIN — SENNOSIDES AND DOCUSATE SODIUM 2 TABLET: 8.6; 5 TABLET ORAL at 17:08

## 2019-01-01 RX ADMIN — GABAPENTIN 300 MG: 300 CAPSULE ORAL at 22:07

## 2019-01-01 RX ADMIN — FENTANYL CITRATE 25 MCG: 0.05 INJECTION, SOLUTION INTRAMUSCULAR; INTRAVENOUS at 13:04

## 2019-01-01 RX ADMIN — HEPARIN SODIUM 1050 UNITS/HR: 5000 INJECTION, SOLUTION INTRAVENOUS at 18:12

## 2019-01-01 RX ADMIN — MORPHINE SULFATE 2 MG: 4 INJECTION INTRAVENOUS at 13:29

## 2019-01-01 RX ADMIN — SODIUM CHLORIDE: 9 INJECTION, SOLUTION INTRAVENOUS at 16:12

## 2019-01-01 RX ADMIN — HEPARIN SODIUM 5000 UNITS: 1000 INJECTION, SOLUTION INTRAVENOUS; SUBCUTANEOUS at 12:03

## 2019-01-01 RX ADMIN — LABETALOL HYDROCHLORIDE 10 MG: 5 INJECTION INTRAVENOUS at 20:56

## 2019-01-01 RX ADMIN — METOPROLOL SUCCINATE 50 MG: 50 TABLET, EXTENDED RELEASE ORAL at 05:38

## 2019-01-01 RX ADMIN — HEPARIN SODIUM 1050 UNITS: 5000 INJECTION, SOLUTION INTRAVENOUS at 12:06

## 2019-01-01 RX ADMIN — ONDANSETRON 4 MG: 2 INJECTION INTRAMUSCULAR; INTRAVENOUS at 09:24

## 2019-01-01 RX ADMIN — MORPHINE SULFATE 2 MG: 4 INJECTION INTRAVENOUS at 11:02

## 2019-01-01 RX ADMIN — SODIUM CHLORIDE: 9 INJECTION, SOLUTION INTRAVENOUS at 04:05

## 2019-01-01 RX ADMIN — SODIUM CHLORIDE: 9 INJECTION, SOLUTION INTRAVENOUS at 06:07

## 2019-01-01 RX ADMIN — SODIUM CHLORIDE: 9 INJECTION, SOLUTION INTRAVENOUS at 16:32

## 2019-01-01 RX ADMIN — GABAPENTIN 400 MG: 400 CAPSULE ORAL at 04:42

## 2019-01-01 RX ADMIN — OXYCODONE HYDROCHLORIDE 5 MG: 5 TABLET ORAL at 19:45

## 2019-01-01 RX ADMIN — OXYCODONE HYDROCHLORIDE 5 MG: 5 TABLET ORAL at 13:49

## 2019-01-01 RX ADMIN — LABETALOL HYDROCHLORIDE 10 MG: 5 INJECTION INTRAVENOUS at 00:15

## 2019-01-01 RX ADMIN — GABAPENTIN 300 MG: 300 CAPSULE ORAL at 20:50

## 2019-01-01 RX ADMIN — OXYCODONE HYDROCHLORIDE 5 MG: 5 TABLET ORAL at 20:29

## 2019-01-01 RX ADMIN — CEFAZOLIN 2 G: 330 INJECTION, POWDER, FOR SOLUTION INTRAMUSCULAR; INTRAVENOUS at 10:29

## 2019-01-01 RX ADMIN — OXYCODONE HYDROCHLORIDE 10 MG: 10 TABLET ORAL at 12:04

## 2019-01-01 RX ADMIN — OXYCODONE HYDROCHLORIDE 5 MG: 5 TABLET ORAL at 03:59

## 2019-01-01 RX ADMIN — OXYCODONE HYDROCHLORIDE 5 MG: 5 TABLET ORAL at 10:05

## 2019-01-01 RX ADMIN — OXYCODONE HYDROCHLORIDE 10 MG: 10 TABLET ORAL at 17:25

## 2019-01-01 RX ADMIN — TAMSULOSIN HYDROCHLORIDE 0.4 MG: 0.4 CAPSULE ORAL at 08:10

## 2019-01-01 RX ADMIN — OXYCODONE HYDROCHLORIDE 5 MG: 5 TABLET ORAL at 16:12

## 2019-01-01 RX ADMIN — GABAPENTIN 400 MG: 400 CAPSULE ORAL at 17:25

## 2019-01-01 RX ADMIN — OXYCODONE HYDROCHLORIDE 10 MG: 10 TABLET ORAL at 21:46

## 2019-01-01 RX ADMIN — OXYCODONE HYDROCHLORIDE 5 MG: 5 TABLET ORAL at 23:41

## 2019-01-01 RX ADMIN — MORPHINE SULFATE 2 MG: 4 INJECTION INTRAVENOUS at 17:08

## 2019-01-01 RX ADMIN — POLYETHYLENE GLYCOL 3350 1 PACKET: 17 POWDER, FOR SOLUTION ORAL at 08:10

## 2019-01-01 RX ADMIN — MORPHINE SULFATE 2 MG: 4 INJECTION INTRAVENOUS at 09:15

## 2019-01-01 RX ADMIN — SODIUM CHLORIDE: 9 INJECTION, SOLUTION INTRAVENOUS at 12:11

## 2019-01-01 RX ADMIN — LABETALOL HYDROCHLORIDE 10 MG: 5 INJECTION INTRAVENOUS at 03:25

## 2019-01-01 RX ADMIN — MORPHINE SULFATE 2 MG: 4 INJECTION INTRAVENOUS at 09:28

## 2019-01-01 ASSESSMENT — ENCOUNTER SYMPTOMS
RESPIRATORY NEGATIVE: 1
SPUTUM PRODUCTION: 0
NEUROLOGICAL NEGATIVE: 1
CARDIOVASCULAR NEGATIVE: 1
GASTROINTESTINAL NEGATIVE: 1
EYES NEGATIVE: 1
CARDIOVASCULAR NEGATIVE: 1
EYES NEGATIVE: 1
NEUROLOGICAL NEGATIVE: 1
CHILLS: 0
RESPIRATORY NEGATIVE: 1
FEVER: 0
MYALGIAS: 1
EYES NEGATIVE: 1
CONSTITUTIONAL NEGATIVE: 1
CONSTITUTIONAL NEGATIVE: 1
SHORTNESS OF BREATH: 0
GASTROINTESTINAL NEGATIVE: 1
CHILLS: 0
NEUROLOGICAL NEGATIVE: 1
CONSTITUTIONAL NEGATIVE: 1
MYALGIAS: 1
CHILLS: 0
MYALGIAS: 1
EYES NEGATIVE: 1
MYALGIAS: 1
RESPIRATORY NEGATIVE: 1
CONSTITUTIONAL NEGATIVE: 1
MYALGIAS: 1
NEUROLOGICAL NEGATIVE: 1
COUGH: 0
CONSTITUTIONAL NEGATIVE: 1
RESPIRATORY NEGATIVE: 1
MYALGIAS: 1
CHILLS: 0
CARDIOVASCULAR NEGATIVE: 1
FEVER: 0
RESPIRATORY NEGATIVE: 1
FEVER: 0
CHILLS: 0
FEVER: 0
EYES NEGATIVE: 1
NEUROLOGICAL NEGATIVE: 1
PSYCHIATRIC NEGATIVE: 1
RESPIRATORY NEGATIVE: 1
CARDIOVASCULAR NEGATIVE: 1
GASTROINTESTINAL NEGATIVE: 1
GASTROINTESTINAL NEGATIVE: 1
CARDIOVASCULAR NEGATIVE: 1
GASTROINTESTINAL NEGATIVE: 1
CARDIOVASCULAR NEGATIVE: 1
GASTROINTESTINAL NEGATIVE: 1
PSYCHIATRIC NEGATIVE: 1
CONSTITUTIONAL NEGATIVE: 1
PSYCHIATRIC NEGATIVE: 1
FEVER: 0
NEUROLOGICAL NEGATIVE: 1
EYES NEGATIVE: 1

## 2019-01-01 ASSESSMENT — COGNITIVE AND FUNCTIONAL STATUS - GENERAL
WALKING IN HOSPITAL ROOM: A LOT
SUGGESTED CMS G CODE MODIFIER MOBILITY: CK
HELP NEEDED FOR BATHING: A LITTLE
SUGGESTED CMS G CODE MODIFIER DAILY ACTIVITY: CJ
MOBILITY SCORE: 15
DRESSING REGULAR LOWER BODY CLOTHING: A LITTLE
TURNING FROM BACK TO SIDE WHILE IN FLAT BAD: A LITTLE
MOVING FROM LYING ON BACK TO SITTING ON SIDE OF FLAT BED: A LITTLE
DAILY ACTIVITIY SCORE: 22
CLIMB 3 TO 5 STEPS WITH RAILING: A LOT
MOVING TO AND FROM BED TO CHAIR: A LITTLE
STANDING UP FROM CHAIR USING ARMS: A LOT

## 2019-01-01 ASSESSMENT — LIFESTYLE VARIABLES
EVER_SMOKED: YES
EVER FELT BAD OR GUILTY ABOUT YOUR DRINKING: NO
TOTAL SCORE: 0
HAVE PEOPLE ANNOYED YOU BY CRITICIZING YOUR DRINKING: NO
TOTAL SCORE: 0
EVER HAD A DRINK FIRST THING IN THE MORNING TO STEADY YOUR NERVES TO GET RID OF A HANGOVER: NO
EVER_SMOKED: YES
AVERAGE NUMBER OF DAYS PER WEEK YOU HAVE A DRINK CONTAINING ALCOHOL: 0
DOES PATIENT WANT TO STOP DRINKING: NO
HAVE YOU EVER FELT YOU SHOULD CUT DOWN ON YOUR DRINKING: NO
ALCOHOL_USE: NO
ON A TYPICAL DAY WHEN YOU DRINK ALCOHOL HOW MANY DRINKS DO YOU HAVE: 0
TOTAL SCORE: 0
CONSUMPTION TOTAL: NEGATIVE
HOW MANY TIMES IN THE PAST YEAR HAVE YOU HAD 5 OR MORE DRINKS IN A DAY: 0

## 2019-01-01 ASSESSMENT — COPD QUESTIONNAIRES
DO YOU EVER COUGH UP ANY MUCUS OR PHLEGM?: NO/ONLY WITH OCCASIONAL COLDS OR INFECTIONS
HAVE YOU SMOKED AT LEAST 100 CIGARETTES IN YOUR ENTIRE LIFE: YES
DO YOU EVER COUGH UP ANY MUCUS OR PHLEGM?: NO/ONLY WITH OCCASIONAL COLDS OR INFECTIONS
IN THE PAST 12 MONTHS DO YOU DO LESS THAN YOU USED TO BECAUSE OF YOUR BREATHING PROBLEMS: DISAGREE/UNSURE
COPD SCREENING SCORE: 4
DURING THE PAST 4 WEEKS HOW MUCH DID YOU FEEL SHORT OF BREATH: NONE/LITTLE OF THE TIME
DURING THE PAST 4 WEEKS HOW MUCH DID YOU FEEL SHORT OF BREATH: NONE/LITTLE OF THE TIME
HAVE YOU SMOKED AT LEAST 100 CIGARETTES IN YOUR ENTIRE LIFE: YES

## 2019-01-01 ASSESSMENT — PATIENT HEALTH QUESTIONNAIRE - PHQ9
SUM OF ALL RESPONSES TO PHQ9 QUESTIONS 1 AND 2: 0
1. LITTLE INTEREST OR PLEASURE IN DOING THINGS: NOT AT ALL
2. FEELING DOWN, DEPRESSED, IRRITABLE, OR HOPELESS: NOT AT ALL

## 2019-12-26 PROBLEM — I73.9 PAD (PERIPHERAL ARTERY DISEASE) (HCC): Status: ACTIVE | Noted: 2019-01-01

## 2019-12-26 PROBLEM — I70.229 CRITICAL LOWER LIMB ISCHEMIA (HCC): Status: ACTIVE | Noted: 2019-01-01

## 2019-12-26 PROBLEM — N17.9 AKI (ACUTE KIDNEY INJURY) (HCC): Status: ACTIVE | Noted: 2019-01-01

## 2019-12-26 PROBLEM — E78.5 DYSLIPIDEMIA: Status: ACTIVE | Noted: 2019-01-01

## 2019-12-26 PROBLEM — I10 ESSENTIAL HYPERTENSION: Status: ACTIVE | Noted: 2019-01-01

## 2019-12-26 PROBLEM — I70.90 ARTERIAL OCCLUSION DUE TO ARTERIOSCLEROSIS: Status: ACTIVE | Noted: 2019-01-01

## 2019-12-26 PROBLEM — Z89.611 S/P AKA (ABOVE KNEE AMPUTATION), RIGHT (HCC): Status: ACTIVE | Noted: 2019-01-01

## 2019-12-26 PROBLEM — Z87.891 HISTORY OF TOBACCO ABUSE: Status: ACTIVE | Noted: 2019-01-01

## 2019-12-26 NOTE — ED NOTES
Pt medicated per md order.  Plan of care discussed with pt.  Call light in place, no needs voiced at this time.

## 2019-12-26 NOTE — ED PROVIDER NOTES
ED Provider Note    Scribed for Josemanuel Shannon M.D. by Earline Marvin. 12/26/2019  10:59 AM    Primary care provider: None noted  Means of arrival: EMS   History obtained from: Patient  History limited by: None    CHIEF COMPLAINT  Chief Complaint   Patient presents with   • Leg Pain       HPI  Antonio Walker is a 69 y.o. male who presents to the Emergency Department for evaluation of left leg pain onset 5 days ago. The patient reports that he has had toe redness for many months. He reports that he had an ultrasound on his leg two weeks ago and was told to follow up with a specialist. The patient had a right-sided AKA approximately 5 years ago. He states that he ambulates with a wheelchair. He reports a history of smoking but states that he stopped approximately 6 years ago.     REVIEW OF SYSTEMS  Pertinent negatives include no fever. As above, all other systems reviewed and are negative.   See HPI for further details.     PAST MEDICAL HISTORY   Right-sided AKA (2013)    SURGICAL HISTORY  patient denies any surgical history    SOCIAL HISTORY  Social History     Tobacco Use   • Smoking status: History of smoking, stopped 6 years ago   Substance Use Topics   • Alcohol use: None noted   • Drug use: None noted      Social History     Substance and Sexual Activity   Drug Use None noted       FAMILY HISTORY  None noted    CURRENT MEDICATIONS    Current Facility-Administered Medications:   •  [COMPLETED] heparin injection 5,000 Units, 5,000 Units, Intravenous, Once, 5,000 Units at 12/26/19 1203 **AND** heparin injection 2,600 Units, 2,600 Units, Intravenous, PRN **AND** heparin infusion 25,000 units in 500 mL 0.45% NACL, , Intravenous, Continuous, Last Rate: 21 mL/hr at 12/26/19 1206, 1,050 Units at 12/26/19 1206 **AND** Protocol 440 Heparin Weight Based DO NOT GIVE ANY HEPARIN BOLUS TO STROKE PATIENT, , , CONTINUOUS **AND** Protocol 440 Heparin Weight Based Discontinue Enoxaparin (Lovenox), Dabigatran  (Pradaxa), Rivaroxaban (Xarelto), Apixaban (Eliquis), Edoxaban (Savaysa, Lixiana), Fondaparinux (Arixtra) and Argatroban prior to heparin administration, , , CONTINUOUS **AND** Protocol 440 Heparin Weight Based Draw baseline aPTT, PT, and platelet count if not already done, , , CONTINUOUS **AND** Protocol 440 Heparin Weight Based Draw aPTT 6 hours after beginning infusion. , , , CONTINUOUS **AND** Protocol 440 Heparin Weight Based Draw Platelet count every three days. Contact MD if platelet is 50% lower than baseline count., , , CONTINUOUS **AND** Protocol 440 Heparin Weight Based Record Patient Data, , , CONTINUOUS **AND** Protocol 440 Heparin Weight Based INSTRUCTIONS, , , CONTINUOUS **AND** Protocol 440 Heparin Weight Based Review aPTT results 6 hours after infusion is begun as detailed, , , CONTINUOUS **AND** Protocol 440 Heparin Weight Based Adjust heparin to maintain aPTT between 55-96 sec, , , CONTINUOUS **AND** Protocol 440 Heparin Weight Based Order aPTT 6 hours after any rate change or hold until aPTT is therapeutic (55-96 seconds), , , CONTINUOUS **AND** Protocol 440 Heparin Weight Based Documentation and verification, , , CONTINUOUS, Josemanuel Shannon M.D.  •  gabapentin (NEURONTIN) capsule 300 mg, 300 mg, Oral, TID, Govind Bullard M.D.  •  metoprolol SR (TOPROL XL) tablet 50 mg, 50 mg, Oral, DAILY, Govind Bullard M.D.  •  Pharmacy Consult Request - to monitor for nephrotoxic agents, 1 Each, Other, PHARMACY TO DOSE, Govind Bullard M.D.  •  senna-docusate (PERICOLACE or SENOKOT S) 8.6-50 MG per tablet 2 Tab, 2 Tab, Oral, BID **AND** polyethylene glycol/lytes (MIRALAX) PACKET 1 Packet, 1 Packet, Oral, QDAY PRN **AND** magnesium hydroxide (MILK OF MAGNESIA) suspension 30 mL, 30 mL, Oral, QDAY PRN **AND** bisacodyl (DULCOLAX) suppository 10 mg, 10 mg, Rectal, QDAY PRN, Govind Schmidhuber, M.D.  •  Respiratory Care per Protocol, , Nebulization, Continuous RT, Govind Bullard M.D.  •   "acetaminophen (TYLENOL) tablet 650 mg, 650 mg, Oral, Q6HRS PRN, Govind Bullard M.D.  •  Notify provider if pain remains uncontrolled, , , CONTINUOUS **AND** Use the numeric rating scale (NRS-11) on regular floors and Critical-Care Pain Observation Tool (CPOT) on ICUs/Trauma to assess pain, , , CONTINUOUS **AND** Pulse Ox (Oximetry), , , CONTINUOUS **AND** Pharmacy Consult Request ...Pain Management Review 1 Each, 1 Each, Other, PHARMACY TO DOSE **AND** If patient difficult to arouse and/or has respiratory depression, stop any opiates that are currently infusing and call a Rapid Response., , , CONTINUOUS **AND** oxyCODONE immediate-release (ROXICODONE) tablet 2.5 mg, 2.5 mg, Oral, Q3HRS PRN **AND** oxyCODONE immediate-release (ROXICODONE) tablet 5 mg, 5 mg, Oral, Q3HRS PRN **AND** morphine (pf) 4 MG/ML injection 2 mg, 2 mg, Intravenous, Q3HRS PRN, Govind Bullard M.D.  •  ondansetron (ZOFRAN) syringe/vial injection 4 mg, 4 mg, Intravenous, Q4HRS PRN, Govind Bullard M.D.  •  ondansetron (ZOFRAN ODT) dispertab 4 mg, 4 mg, Oral, Q4HRS PRN, Govind Bullard M.D.  •  sodium bicarbonate 8.4 % 50 mEq in D5W 1,000 mL infusion, , Intravenous, Once, Govind Bullard M.D.  •  MD Alert...HEPARIN WEIGHT BASED PROTOCOL Pharmacist to implement, , Other, PRN, Govind Bullard M.D.    Current Outpatient Medications:   •  MELATONIN PO, Take 2 Tabs by mouth every bedtime., Disp: , Rfl:   •  gabapentin (NEURONTIN) 300 MG Cap, Take 300 mg by mouth 3 times a day., Disp: , Rfl:   •  hydroCHLOROthiazide (HYDRODIURIL) 25 MG Tab, Take 25 mg by mouth every day., Disp: , Rfl:   •  lisinopril (PRINIVIL) 40 MG tablet, Take 40 mg by mouth every day., Disp: , Rfl:   •  metoprolol SR (TOPROL XL) 50 MG TABLET SR 24 HR, Take 50 mg by mouth every day., Disp: , Rfl:     ALLERGIES  No Known Allergies    PHYSICAL EXAM  VITAL SIGNS: /77   Pulse (!) 111   Temp 36.4 °C (97.6 °F) (Temporal)   Resp 15   Ht 1.702 m (5' 7\")   Wt " 81.6 kg (180 lb)   SpO2 97%   BMI 28.19 kg/m²   Constitutional: Well developed, Well nourished, No acute distress, Non-toxic appearance.   HENT: Normocephalic, Atraumatic, Bilateral external ears normal, Oropharynx is clear mucous membranes are moist. No oral exudates or nasal discharge.   Eyes: Pupils are equal round and reactive, EOMI, Conjunctiva normal, No discharge.   Neck: Normal range of motion, No tenderness, Supple, No stridor. No meningismus.  Lymphatic: No lymphadenopathy noted.   Cardiovascular: Tachycardic. Regular rhythm without murmur rub or gallop.  Thorax & Lungs: Clear breath sounds bilaterally without wheezes, rhonchi or rales. There is no chest wall tenderness.   Abdomen: Soft non-tender non-distended. There is no rebound or guarding. No organomegaly is appreciated. Bowel sounds are normal.  Skin: Normal without rash.   Back: No CVA or spinal tenderness.   Extremities: Intact distal pulses, No edema, No tenderness, No cyanosis, No clubbing. No PT or DP pulses. Line of demarcation at the left foot distally that shows discoloration and redness including a left 5th toe that appears necrotic.    Musculoskeletal: Good range of motion in all major joints. No tenderness to palpation or major deformities noted.   Neurologic: Alert & oriented x 3, Normal motor function, Normal sensory function, No focal deficits noted. Reflexes are normal.  Psychiatric: Affect normal, Judgment normal, Mood normal. There is no suicidal ideation or patient reported hallucinations.     DIAGNOSTIC STUDIES / PROCEDURES    LABS  Labs Reviewed   CBC WITH DIFFERENTIAL - Abnormal; Notable for the following components:       Result Value    WBC 11.7 (*)     RBC 4.37 (*)     Hemoglobin 13.3 (*)     Hematocrit 39.6 (*)     MCHC 33.6 (*)     Lymphocytes 16.50 (*)     Neutrophils (Absolute) 8.17 (*)     Monos (Absolute) 1.19 (*)     All other components within normal limits    Narrative:     Indicate which anticoagulants the patient  is on:->UNKNOWN   COMP METABOLIC PANEL - Abnormal; Notable for the following components:    Sodium 129 (*)     Co2 16 (*)     Anion Gap 14.0 (*)     Glucose 124 (*)     Bun 60 (*)     Creatinine 2.99 (*)     Alkaline Phosphatase 119 (*)     Globulin 4.0 (*)     All other components within normal limits    Narrative:     Indicate which anticoagulants the patient is on:->UNKNOWN   APTT - Abnormal; Notable for the following components:    APTT 42.0 (*)     All other components within normal limits    Narrative:     Indicate which anticoagulants the patient is on:->UNKNOWN   ESTIMATED GFR - Abnormal; Notable for the following components:    GFR If  25 (*)     GFR If Non  21 (*)     All other components within normal limits    Narrative:     Indicate which anticoagulants the patient is on:->UNKNOWN   PROTHROMBIN TIME    Narrative:     Indicate which anticoagulants the patient is on:->UNKNOWN   URINE SODIUM RANDOM   URINE CREATININE RANDOM   CRP QUANTITIVE (NON-CARDIAC)   WESTERGREN SED RATE   PHOSPHORUS   URIC ACID   URINALYSIS      All labs reviewed by me.    RADIOLOGY  US-EXTREMITY ARTERY LOWER UNILAT LEFT   Final Result      US-RIVAS SINGLE LEVEL BILAT   Final Result      US-RENAL    (Results Pending)     The radiologist's interpretation of all radiological studies have been reviewed by me.    COURSE & MEDICAL DECISION MAKING  Nursing notes, VS, PMSFHx reviewed in chart.    10:59 AM Patient seen and examined at bedside. Ordered for US-Extremity artery lower unilateral and labs to evaluate. Patient was treated with morphine 4 mg/mL and Zofran 4 mg for his symptoms.      11:17 AM - Patient will be treated with heparin injection 5,000 units, heparin infusion 25,000 units, and heparin injection 2,600 units PRN.  Heparin was started early as a had a high clinical suspicion of arterial occlusion    12:21 PM - Patient was reevaluated at bedside. He reports that is pain is not improved. I  informed him that I will calling vascular surgery.    Laboratory evaluation reveals slight leukocytosis, slight hyponatremia, evidence of renal failure with a BUN and creatinine of 60 and 2.99.  INR is normal.    1:17 PM - Paged Vascular.    1:21 PM - I discussed the patient's case and the above findings with Dr. Valles (Vascular) who agrees to evaluate the patient.  Ultrasound findings show a femoral occlusion on the left side.    1:44 PM I discussed the patient's case and the above findings with Dr. Torres (Hospitalist) who agrees to evaluate the patient for hospitalization.     DISPOSITION:  Patient will be hospitalized by Dr. Torres in guarded condition.      FINAL IMPRESSION  1. Arterial occlusion    2. PVD (peripheral vascular disease) (HCC)    3. Hyponatremia    4. Renal failure, unspecified chronicity          Earline CENTENO (Jeny), am scribing for, and in the presence of, Josemanuel Shannon M.D..    Electronically signed by: Earline Whitlock), 12/26/2019    Josemanuel CENTENO M.D. personally performed the services described in this documentation, as scribed by Earline Marvin in my presence, and it is both accurate and complete.    C.    The note accurately reflects work and decisions made by me.  Josemanuel Shanonn  12/26/2019  3:14 PM

## 2019-12-26 NOTE — CONSULTS
DATE OF CONSULTATION: 12/26/2019     REFERRING PHYSICIAN: MD Shiva.     CONSULTING PHYSICIAN: Gage Valles M.D.      REASON FOR CONSULTATION: Left foot pain    HISTORY OF PRESENT ILLNESS: The patient is a 69-year-old gentleman who presents to the emergency room with a 5-day history of severe rest pain in his left foot.  The patient has a history of peripheral arterial disease and is undergone a right above-the-knee amputation several years ago in [place].  The patient is unaware of his previous past vascular procedures in that left leg but he does have a left groin incision which is well-healed.  The patient reports that he is always had some discomfort in that left foot but over the past 5 days the pain is been unbearable.  The patient is awake and alert he can move the toes.  He does have some evidence of tissue loss on his left fifth toe.  Noninvasive arterial studies demonstrate significant left lower extremity vascular occlusive disease.    PAST MEDICAL HISTORY:   Peripheral arterial disease    PAST SURGICAL HISTORY: Right above-the-knee amputation     ALLERGIES: No Known Allergies     CURRENT MEDICATIONS:   Home Medications     Reviewed by David Cortez (Pharmacy Tech) on 12/26/19 at 1337  Med List Status: Complete   Medication Last Dose Status   gabapentin (NEURONTIN) 300 MG Cap 12/26/2019 Active   hydroCHLOROthiazide (HYDRODIURIL) 25 MG Tab 12/26/2019 Active   lisinopril (PRINIVIL) 40 MG tablet 12/26/2019 Active   MELATONIN PO 12/25/2019 Active   metoprolol SR (TOPROL XL) 50 MG TABLET SR 24 HR 12/23/2019 Active                FAMILY HISTORY: No family history on file.     SOCIAL HISTORY:   Social History     Tobacco Use   • Smoking status: Not on file   Substance and Sexual Activity   • Alcohol use: Not on file   • Drug use: Not on file   • Sexual activity: Not on file       Review of Systems:      Constitutional: Denies fevers, Denies weight changes  Eyes: Denies changes in vision, no eye  pain  Ears/Nose/Throat/Mouth: Denies nasal congestion or sore throat   Cardiovascular: Denies chest pain or palpitations.  Respiratory: Denies shortness of breath, cough, and wheezing.  Gastrointestinal/Hepatic: Denies abdominal pain, nausea, vomiting, diarrhea, constipation or GI bleeding   Genitourinary: Denies dysuria or frequency  Musculoskeletal/Rheum: Pain left foot   neurological: Denies headache, confusion, memory loss or focal weakness/parasthesias  Psychiatric: denies mood disorder   Endocrine: Kateryna thyroid problems  Heme/Oncology/Lymph Nodes: Denies enlarged lymph nodes, denies brusing or known bleeding disorder  All other systems were reviewed and are negative (AMA/CMS criteria)                  PHYSICAL EXAMINATION:       Constitutional:   Well developed, Well nourished, No acute distress  HENMT:  Normocephalic, Atraumatic, Oropharynx moist mucous membranes, No oral exudates, Nose normal.  No thyromegaly.  Eyes:  EOMI, Conjunctiva normal, No discharge.  Neck:  Normal range of motion, No cervical tenderness,  no JVD.  Cardiovascular:  Normal heart rate, Normal rhythm, No murmurs, No rubs, No gallops.     Extremitites left foot is cool, motor function intact   lungs:  Normal breath sounds, breath sounds clear to auscultation bilaterally,  no crackles, no wheezing.   Abdomen: Bowel sounds normal, Soft, No tenderness, No guarding, No rebound, No masses, No hepatosplenomegaly.  Skin: Warm, Dry, No erythema, No rash, no induration.  Neurologic: Alert & oriented x 3, No focal deficits noted, cranial nerves II through X are grossly intact.  Psychiatric: Affect normal, Judgment normal, Mood normal.        LABORATORY VALUES:   Recent Labs     12/26/19  1031   WBC 11.7*   RBC 4.37*   HEMOGLOBIN 13.3*   HEMATOCRIT 39.6*   MCV 90.6   MCH 30.4   MCHC 33.6*   RDW 46.3   PLATELETCT 332   MPV 10.3     Recent Labs     12/26/19  1031   SODIUM 129*   POTASSIUM 5.0   CHLORIDE 99   CO2 16*   GLUCOSE 124*   BUN 60*    CREATININE 2.99*   CALCIUM 9.3     Recent Labs     12/26/19  1031   ASTSGOT 15   ALTSGPT 7   TBILIRUBIN 0.5   ALKPHOSPHAT 119*   GLOBULIN 4.0*   INR 1.00     Recent Labs     12/26/19  1031   APTT 42.0*   INR 1.00        IMAGING:   US-EXTREMITY ARTERY LOWER UNILAT LEFT   Final Result      US-RIVAS SINGLE LEVEL BILAT   Final Result          IMPRESSION AND PLAN:     Acute on chronic severe left lower extremity arterial insufficiency  Worsening symptoms for 5 days    Recommendation-     Heparin drip has been initiated  N.p.o. after midnight  To the operating room tomorrow for left lower extremity angiogram and vascular reconstruction    Risk benefits alternatives and expectations discussed with the patient  Patient agrees to proceed    ____________________________________   Gage Valles M.D.          DD: 12/26/2019   DT: 2:38 PM

## 2019-12-26 NOTE — ED NOTES
Medication reconciliation updated and complete per pt at bedside with rx bottles. Reviewed rx bottles and returned to pt at bedside  Allergies have been verified   No oral ABX within the last 14 days  Pt home pharmacy:[facility]

## 2019-12-26 NOTE — ED TRIAGE NOTES
Ems called for left lower leg pain.  Hx of vascular disease, right leg amputation from complications.  Pt denies trauma.  Us of left leg performed at [facility] 2 weeks ago.

## 2019-12-26 NOTE — ED NOTES
Pedal pulse found via doppler to left lower extremity.  erp notified.  V/o for 4mg of zofran and 4mg of morphine to be given iv.

## 2019-12-27 NOTE — H&P
Hospital Medicine History & Physical Note    Date of Service  12/26/2019    Primary Care Physician  No primary care provider on file.    Consultants  Vascular surgery    Code Status  Full code    Chief Complaint  Painful left lower extremity    History of Presenting Illness  69 y.o. male who presented 12/26/2019 with a history of peripheral arterial disease where he had a right above-the-knee amputation remotely, he has a history of hypertension, tobacco abuse, diabetes, dyslipidemia, presenting to the emergency room with approximately 1 week worth of increasing left lower extremity pain and discoloration.  It initially started than the heel and then became more painful into the forefoot and toes, the patient has a right AKA and ambulates with a wheelchair only, he recently transferred from the [facility] to [facility].  The patient states that he has a history of liver and kidney disease but apparently this resolved.  He stopped smoking about 6 years ago, patient is evaluated in emergency room and found to have a acute on chronic left lower extremity arterial occlusion starting in the femoral area, he is started on a heparin drip and referred to vascular surgery Dr. Hewitt.  The patient denies chest pain, palpitation, he denies prior heart history.    Review of Systems  Review of Systems   Constitutional: Negative.    HENT: Negative.    Eyes: Negative.    Respiratory: Negative.  Negative for cough, sputum production and shortness of breath.    Cardiovascular: Negative.  Negative for chest pain and leg swelling.   Gastrointestinal: Negative.    Genitourinary: Negative.    Musculoskeletal: Positive for myalgias.        Left lower foot pain   Skin: Negative.    Neurological: Negative.    Endo/Heme/Allergies: Negative.    Psychiatric/Behavioral: Negative.    All other systems reviewed and are negative.      Past Medical History   has no past medical history on file.  Hypertension, CKD, PAD, diabetes type 2,  dyslipidemia, extensive tobacco abuse history    Surgical History   has no past surgical history on file.   Right AKA, bilateral shoulder repair  Family History  family history is not on file.   Patient denies family history contributory to this presentation, he is a vague historian  Social History     Extensive tobacco abuse, 1 pack/day for 50+ years, he quit 6 years ago, he denies alcohol or drug use, he is single, he uses marijuana products  Allergies  No Known Allergies    Medications  Prior to Admission Medications   Prescriptions Last Dose Informant Patient Reported? Taking?   MELATONIN PO 12/25/2019 at hs Patient Yes Yes   Sig: Take 2 Tabs by mouth every bedtime.   gabapentin (NEURONTIN) 300 MG Cap 12/26/2019 at 0830 Rx Bottle (For Med Information) Yes No   Sig: Take 300 mg by mouth 3 times a day.   hydroCHLOROthiazide (HYDRODIURIL) 25 MG Tab 12/26/2019 at 0830 Rx Bottle (For Med Information) Yes No   Sig: Take 25 mg by mouth every day.   lisinopril (PRINIVIL) 40 MG tablet 12/26/2019 at 0830 Rx Bottle (For Med Information) Yes No   Sig: Take 40 mg by mouth every day.   metoprolol SR (TOPROL XL) 50 MG TABLET SR 24 HR 12/23/2019 at ran out Rx Bottle (For Med Information) Yes No   Sig: Take 50 mg by mouth every day.      Facility-Administered Medications: None       Physical Exam  Temp:  [36.4 °C (97.6 °F)] 36.4 °C (97.6 °F)  Pulse:  [] 77  Resp:  [4-27] 4  BP: (106-153)/(52-83) 140/68  SpO2:  [97 %-100 %] 99 %    Physical Exam  Vitals signs and nursing note reviewed.   Constitutional:       Appearance: He is well-developed.      Comments: Pt seen and examined.   HENT:      Head: Normocephalic and atraumatic.   Eyes:      Pupils: Pupils are equal, round, and reactive to light.   Neck:      Musculoskeletal: Normal range of motion and neck supple.   Cardiovascular:      Rate and Rhythm: Normal rate.      Heart sounds: Normal heart sounds.   Pulmonary:      Effort: Pulmonary effort is normal.      Breath  sounds: Normal breath sounds.   Abdominal:      General: Bowel sounds are normal.      Palpations: Abdomen is soft.   Genitourinary:     Penis: Normal.    Musculoskeletal: Normal range of motion.      Comments: Right AKA  Left dusky toes and foot discoloration  No pulses palpable   Skin:     General: Skin is dry.      Findings: Erythema present.   Neurological:      Mental Status: He is alert and oriented to person, place, and time.   Psychiatric:         Behavior: Behavior normal.         Laboratory:  Recent Labs     12/26/19  1031   WBC 11.7*   RBC 4.37*   HEMOGLOBIN 13.3*   HEMATOCRIT 39.6*   MCV 90.6   MCH 30.4   MCHC 33.6*   RDW 46.3   PLATELETCT 332   MPV 10.3     Recent Labs     12/26/19  1031   SODIUM 129*   POTASSIUM 5.0   CHLORIDE 99   CO2 16*   GLUCOSE 124*   BUN 60*   CREATININE 2.99*   CALCIUM 9.3     Recent Labs     12/26/19  1031   ALTSGPT 7   ASTSGOT 15   ALKPHOSPHAT 119*   TBILIRUBIN 0.5   GLUCOSE 124*     Recent Labs     12/26/19  1031   APTT 42.0*   INR 1.00     No results for input(s): NTPROBNP in the last 72 hours.      No results for input(s): TROPONINT in the last 72 hours.    Urinalysis:    No results found     Imaging:  US-RENAL   Final Result      No hydronephrosis. No renal calculus.      US-EXTREMITY ARTERY LOWER UNILAT LEFT   Final Result      US-RIVAS SINGLE LEVEL BILAT   Final Result            Assessment/Plan:  I anticipate this patient will require at least two midnights for appropriate medical management, necessitating inpatient admission.    ADRIA (acute kidney injury) (HCC)  Assessment & Plan  With unknown baseline, the patient stated that he had CKD in the past but it resolved  Attempt to correct with bicarb drip, follow closely, ultrasound of the renal structures    Critical lower limb ischemia  Assessment & Plan  Left lower extremity, plan for attempted revascularization    Arterial occlusion due to arteriosclerosis  Assessment & Plan  Of the left lower extremity, no dopplerable  pulses, abnormal vascular study with occlusion in the femoral area  Patient referred to vascular surgery, plan for intervention tomorrow  Heparin drip  Pain control    History of tobacco abuse  Assessment & Plan  Extensive, the patient apparently quit 6 years ago    S/P AKA (above knee amputation), right ([facility])  Assessment & Plan  Secondary to vascular complication right lower extremity    Essential hypertension  Assessment & Plan  History of, control multimodality and adjust as indicated    Dyslipidemia  Assessment & Plan  On statin, continue    PAD (peripheral artery disease) ([facility])  Assessment & Plan  History of with right lower extremity amputation, history of extensive tobacco abuse      Plan  Continue heparin drip  Vascular surgery planning for intervention  Statin medication  Blood pressure control  Get EKG, renal ultrasound  Bicarb drip to improve his renal function of possible  Consider nephrology consultation  Close laboratory follow-up  Poor database  Medically complex high risk  VTE prophylaxis: Heparin

## 2019-12-27 NOTE — ANESTHESIA TIME REPORT
Anesthesia Start and Stop Event Times     Date Time Event    12/27/2019 1011 Ready for Procedure     1029 Anesthesia Start     1217 Anesthesia Stop        Responsible Staff  12/27/19    Name Role Begin End    Wellington Del Valle M.D. Anesth 1029 1217        Preop Diagnosis (Free Text):  Pre-op Diagnosis     Acute on chronic severe left lower extremity arterial insufficiency        Preop Diagnosis (Codes):    Post op Diagnosis  Peripheral vascular disease (HCC)      Premium Reason  Non-Premium    Comments:

## 2019-12-27 NOTE — ASSESSMENT & PLAN NOTE
-Of the left lower extremity, no dopplerable pulses, abnormal vascular study with occlusion in the femoral area  -Patient referred to vascular surgery, plan for intervention 12/27/19 with Dr. Valels  -Heparin drip - off prior to surgery on 12/27  -12/28: POD#1 from left femoral thromboendarterectomy and profundoplasty.  -Pain control  -Per Vascular Surgery, pt to go back to OR for LEFT LE angiogram and possible intervention  12/31. Anticipating LLE CT angiography for tomorrow.  Reassured patient.  1/1. Plan for CT angiography today.  1/2. S/p:  1.  Left femoral to above-knee popliteal artery bypass using 6 mm PTFE  2.  Ultrasound-guided access left common femoral artery  3.  Left lower extremity angiogram  For:  1.  Peripheral arterial disease  2.  Left lower extremity rest pain  By:  Dr. Valles, 1/1.  Tolerated procedure  May need SNF

## 2019-12-27 NOTE — CARE PLAN
Problem: Communication  Goal: The ability to communicate needs accurately and effectively will improve  Outcome: PROGRESSING AS EXPECTED   Pt communicating needs and concerns at this time  Problem: Safety  Goal: Will remain free from falls  Outcome: PROGRESSING AS EXPECTED   Room free of clutter, call light within reach, pt calls for assistance

## 2019-12-27 NOTE — PROGRESS NOTES
Received report from PACU RN. LLE is still pulseless and absent when heard by doppler. PACU RN informed MD. MD aware and will come to patient's bedside later today.   Patient assessed.   Prevena wound vac to patient's L groin. Dressing C,D,I.   Patient stated he has 4/10 pain the L foot but denies any intervention at this time.     This RN reviewed patient's new orders.

## 2019-12-27 NOTE — ANESTHESIA PROCEDURE NOTES
Airway  Date/Time: 12/27/2019 10:37 AM  Performed by: Wellington Del Valle M.D.  Authorized by: Wellington Del Valle M.D.     Location:  OR  Urgency:  Elective  Indications for Airway Management:  Anesthesia  Spontaneous Ventilation: absent    Sedation Level:  Deep  Preoxygenated: Yes    Final Airway Type:  Supraglottic airway  Final Supraglottic Airway:  Standard LMA  SGA Size:  3  Number of Attempts at Approach:  1

## 2019-12-27 NOTE — PROGRESS NOTES
Rosas catheter insertion per active order. No known allergies to latex. Sterile technique used. Patient tolerated insertion well. Immediate output of 470 upon rosas insertion.     Report given to Preop RN. Transport here to take patient to preop.

## 2019-12-27 NOTE — PROGRESS NOTES
Received report from night shift nurse.   Assumed care of patient.   Patient A&O x  4  .   Patient denies SOB or Chest pain.   Patient's respirations are even and unlabored .  Patient currently NPO sx this AM diet , Last BM PTA, Bowel sounds normactive x 4 quadrants.   Patient on a heparin drip. 850 units/hr 17ml/hr per protocol. 2 RN rate verification done this AM.   Patient denies pain this AM.   Patient's mobility is bed rest.   Pulses 2+ x bilateral radial pulses. R AKA. LLE no pulse felt or heard by doppler. MD aware.   Skin: RAKA. LLE cool, red, black tissue on the LLE 5th metatarsal. Scattered bruising on bilateral upper extremities. Skin otherwise intact.     Discussed POC with patient and answered any questions that the patient had.   Reinforced use of call light. Bed locked and in lowest position. Belongings and call light in reach. Hourly rounding in place to check on patient's well being.

## 2019-12-27 NOTE — PROGRESS NOTES
OP note dictated     Performed Left femoral thrombectomy and endarterectomy profunda femoris artery     Intra-op angio with minimal contrast     Gage Valles MD

## 2019-12-27 NOTE — DISCHARGE PLANNING
Care Transition Team Assessment      Anticipated Discharge Disposition: Home    Action: RN CM assessed pt at bedside and verified facesheet demographics.  Pt reports he lives in a single story home with his daughter in [place].  He is wheelchair bound, is able to perform most of his ADLs and IADLs with some assist from his daughter and Meals on Wheels. Pt is Retired, has prescription drug coverage, uses "[facility] pharmacy in [place], and reports no financial barriers at this time. Pt's PCP is Eliecer Harkins MD. Pt anticipates discharging home with no needs when medically cleared.     Barriers to Discharge: Medical clearance for home is pending.    Plan: Await medical clearance for discharge home.     Information Source  Orientation : Oriented x 4  Information Given By: Patient  Informant's Name: n/a  Who is responsible for making decisions for patient? : Patient    Readmission Evaluation  Is this a readmission?: No    Elopement Risk  Legal Hold: No  Ambulatory or Self Mobile in Wheelchair: No-Not an Elopement Risk  Elopement Risk: Not at Risk for Elopement    Interdisciplinary Discharge Planning  Primary Care Physician: Eliecer Harkins MD  Lives with - Patient's Self Care Capacity: Adult Children  Patient or legal guardian wants to designate a caregiver (see row info): No  Support Systems: Children  Housing / Facility: 1 Story House  Able to Return to Previous ADL's: Yes  Mobility Issues: Yes  Prior Services: Meals on Wheels, Intermittent Physical Support for ADL Per Family  Patient Expects to be Discharged to:: Home  Assistance Needed: Unknown at this Time  Durable Medical Equipment: Other - Specify(Wheelchair)    Discharge Preparedness  What is your plan after discharge?: Home with help  What are your discharge supports?: Child  Prior Functional Level: Ambulatory, Drives Self, Independent with Activities of Daily Living, Independent with Medication Management  Difficulity with ADLs: None  Difficulity with IADLs:  None    Functional Assesment  Prior Functional Level: Ambulatory, Drives Self, Independent with Activities of Daily Living, Independent with Medication Management    Finances  Financial Barriers to Discharge: No  Prescription Coverage: Yes    Vision / Hearing Impairment  Vision Impairment : Yes  Right Eye Vision: Impaired, Wears Glasses  Left Eye Vision: Impaired, Wears Glasses  Hearing Impairment : No         Advance Directive  Advance Directive?: None    Domestic Abuse  Have you ever been the victim of abuse or violence?: No  Physical Abuse or Sexual Abuse: No  Verbal Abuse or Emotional Abuse: No  Possible Abuse Reported to:: Not Applicable         Discharge Risks or Barriers  Discharge risks or barriers?: No    Anticipated Discharge Information  Anticipated discharge disposition: Home  Discharge Address: [facility]  Discharge Contact Phone Number: [phone]

## 2019-12-27 NOTE — ANESTHESIA POSTPROCEDURE EVALUATION
Patient: Antonio Walker    Procedure Summary     Date:  12/27/19 Room / Location:  [facility] 05 / SURGERY [facility]    Anesthesia Start:  1029 Anesthesia Stop:  1217    Procedures:       ENDARTERECTOMY, FEMORAL (Left Groin)      CREATION, BYPASS, ARTERIAL, FEMORAL TO POPLITEAL, USING GRAFT - POSSIBLE (Left Groin)      ANGIOGRAM (Left Groin) Diagnosis:  (Acute on chronic severe left lower extremity arterial insufficiency)    Surgeon:  Gage Valles M.D. Responsible Provider:  Wellington Del Valle M.D.    Anesthesia Type:  epidural ASA Status:  3 - Emergent          Final Anesthesia Type: epidural  Last vitals  BP   Blood Pressure : 155/62    Temp   36.3 °C (97.4 °F)    Pulse   Pulse: 62   Resp   18    SpO2   97 %      Anesthesia Post Evaluation    Patient location during evaluation: PACU  Patient participation: complete - patient participated  Level of consciousness: awake and alert    Airway patency: patent  Anesthetic complications: no  Cardiovascular status: hemodynamically stable  Respiratory status: acceptable  Hydration status: euvolemic    PONV: none           Nurse Pain Score: 7 (NPRS)

## 2019-12-27 NOTE — PROGRESS NOTES
[facility] Medicine Daily Progress Note    Date of Service  12/27/2019    Chief Complaint  69 y.o. male admitted 12/26/2019 with pain in left lower extremity    Hospital Course    Mr. Walker is a 69-year-old male with a PMH of PAD, right AKA in 2014, HTN, tobacco use, DM 2, HDL, who presented to ER on 12/2619 due to increasing pain and discoloration on his left lower extremity which started about a week ago.  Patient reports that pain and discoloration is started on his left heel then became more painful into the forefoot and toes.  Due to his right AKA, patient ambulates with a wheelchair only.  Patient also notes that he recently moved from the [facility] to [facility].  Patient also reports that he has has a history of liver and kidney disease which apparently resolved prior to admission.  Patient stop smoking approximately 6 years ago.  During evaluation in ER, patient found to have an acute on chronic left lower extremity arterial occlusion starting from the femoral area.  Patient started on a Heparin gtt, and vascular surgery, Dr. Valles, was consulted.  Patient denies any chest pain, no palpitations, and denies any prior heart history.  Patient was admitted into the general surgical unit.      Interval Problem Update  12/27/19:  Patient seen and examined this morning prior to procedure.  Right AKA noted, left lower extremity very tender to touch, discoloration from heel to calf.  Patient noted to have a procedure with vascular surgeon, Dr. Valles, for a left lower angiogram with possible intervention.  Patient kept n.p.o., Heparin gtt protocol in place with clarification from surgeon when to stop drip prior to procedure.  Otherwise, patient has no concerns at this time.  Pending results from a vascular procedure today.  Patient hyponatremic at 126, glucose at 347 which may be due to n.p.o. status and D5W infusion.  Creatinine at 2.58 and BUN at 55, which more likely be contributed from dehydration.  Will  monitor and treat after vascular procedure today.    Consultants/Specialty  -Vascular surgery -Gage Valles    Code Status  Full    Disposition  TBD    Review of Systems  Review of Systems   Constitutional: Negative.  Negative for chills and fever.   HENT: Negative.    Eyes: Negative.    Respiratory: Negative.    Cardiovascular: Negative.    Gastrointestinal: Negative.    Genitourinary: Negative.    Musculoskeletal: Positive for myalgias.   Skin: Positive for itching.   Neurological: Negative.    Endo/Heme/Allergies: Negative.    Psychiatric/Behavioral: Negative.         Physical Exam  Temp:  [35.4 °C (95.8 °F)-36.7 °C (98 °F)] 36.3 °C (97.4 °F)  Pulse:  [60-95] 62  Resp:  [4-27] 18  BP: (125-162)/(52-83) 155/62  SpO2:  [94 %-100 %] 97 %    Physical Exam  Vitals signs and nursing note reviewed.   Constitutional:       Appearance: Normal appearance.   HENT:      Head: Normocephalic.      Nose: Nose normal.      Mouth/Throat:      Mouth: Mucous membranes are moist.      Pharynx: Oropharynx is clear.   Eyes:      Pupils: Pupils are equal, round, and reactive to light.   Neck:      Musculoskeletal: Normal range of motion and neck supple.   Cardiovascular:      Rate and Rhythm: Normal rate and regular rhythm.      Pulses: Normal pulses.      Heart sounds: Normal heart sounds.   Pulmonary:      Effort: Pulmonary effort is normal.      Breath sounds: Normal breath sounds.   Abdominal:      General: Abdomen is flat. Bowel sounds are normal.   Musculoskeletal:         General: Tenderness present.      Left lower leg: Edema present.        Legs:       Comments: RIGHT AKA  LEFT leg - discoloration and pain   Skin:     General: Skin is warm.      Capillary Refill: Capillary refill takes less than 2 seconds.      Findings: Erythema present.   Neurological:      General: No focal deficit present.      Mental Status: He is alert. Mental status is at baseline.   Psychiatric:         Mood and Affect: Mood normal.          Fluids    Intake/Output Summary (Last 24 hours) at 12/27/2019 1053  Last data filed at 12/27/2019 0408  Gross per 24 hour   Intake 1200 ml   Output 650 ml   Net 550 ml       Laboratory  Recent Labs     12/26/19  1031 12/27/19  0254 12/27/19  0448   WBC 11.7* 5.9 6.3   RBC 4.37* 3.46* 3.52*   HEMOGLOBIN 13.3* 10.6* 10.6*   HEMATOCRIT 39.6* 31.1* 32.5*   MCV 90.6 89.9 92.3   MCH 30.4 30.6 30.1   MCHC 33.6* 34.1 32.6*   RDW 46.3 44.6 46.8   PLATELETCT 332 196 192   MPV 10.3 10.0 10.2     Recent Labs     12/26/19  1031 12/27/19  0254   SODIUM 129* 126*   POTASSIUM 5.0 4.1   CHLORIDE 99 95*   CO2 16* 23   GLUCOSE 124* 347*   BUN 60* 55*   CREATININE 2.99* 2.58*   CALCIUM 9.3 8.0*     Recent Labs     12/26/19  1031 12/26/19  1947 12/27/19  0254   APTT 42.0* 171.8* 170.6*   INR 1.00  --   --          Recent Labs     12/27/19  0254   TRIGLYCERIDE 98   HDL 26*   LDL 41       Imaging  US-RENAL   Final Result      No hydronephrosis. No renal calculus.      US-EXTREMITY ARTERY LOWER UNILAT LEFT   Final Result      US-RIVAS SINGLE LEVEL BILAT   Final Result      DX-PORTABLE FLUORO > 1 HOUR    (Results Pending)        Assessment/Plan  ADRIA (acute kidney injury) (HCC)  Assessment & Plan  -With unknown baseline, the patient stated that he had CKD in the past but it resolved  -Attempt to correct with bicarb drip, follow closely, ultrasound of the renal structures  -Cr. 2.58 ; will recheck and treat as needed    Critical lower limb ischemia  Assessment & Plan  -Left lower extremity, plan for attempted revascularization with Dr. 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 Valles  -Heparin drip  -Pain control    History of tobacco abuse  Assessment & Plan  -Extensive, the patient apparently quit 6 years  ago    S/P AKA (above knee amputation), right ([facility])  Assessment & Plan  -Secondary to vascular complication right lower extremity  -AKA done in [place] in 2014    Essential hypertension  Assessment & Plan  -History of, control multimodality and adjust as indicated    Dyslipidemia  Assessment & Plan  -On statin, continue    PAD (peripheral artery disease) ([facility])  Assessment & Plan  -History of with right lower extremity amputation 2014   -history of extensive tobacco abuse       VTE prophylaxis: Heparin  --------------------------------------------------------------------------------------------------------------------------------------------------------------------------------------------------------------------  Electronically signed by:  DEBI Villarreal, MSN, APRN, FNP-C  Hospitalist Services  [facility]  [phone]  [email or web address]  12/27/19   Og

## 2019-12-27 NOTE — RESPIRATORY CARE
COPD EDUCATION by COPD CLINICAL EDUCATOR  12/27/2019 at 8:07 AM by Racheal Duque     Patient reviewed by COPD education team. Patient does not have a formal diagnosis of COPD, denies COPD and is a non-smoker, therefore does not qualify for the COPD program.

## 2019-12-27 NOTE — ASSESSMENT & PLAN NOTE
-With unknown baseline, the patient stated that he had CKD in the past but it resolved  -Attempt to correct with bicarb drip, follow closely, ultrasound of the renal structures  -Cr. 2.58 ; will recheck and treat as needed  -12/28: Cr 2.06  -12/30 Cr 1.42

## 2019-12-27 NOTE — PROGRESS NOTES
Patient voided 180 in urinal. Post residual bladder scan 468. Patient to have rosas catheter inserted per order.

## 2019-12-27 NOTE — PROGRESS NOTES
Critical PTT lab result was 170.6 at 0401. This value is within the defined limits of the Heparin Weight Based Protocol ordered by the physician for this patient. Heparin Weight Based Protocol will be followed for any adjustments, physician WAS NOT notified per protocol instructions.

## 2019-12-27 NOTE — ANESTHESIA PREPROCEDURE EVALUATION
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   (+) Critical lower limb ischemia   (+) Essential hypertension   (+) PAD (peripheral artery disease) (HCC)         (+) ADRIA (acute kidney injury) ([facility])       Physical Exam    Airway   Mallampati: II  TM distance: >3 FB  Neck ROM: full       Cardiovascular - normal exam  Rhythm: regular  Rate: normal  (-) murmur     Dental - normal exam         Pulmonary - normal exam  Breath sounds clear to auscultation     Abdominal    Neurological - normal exam                 Anesthesia Plan    ASA 3- EMERGENT       Plan - epidural   Neuraxial block will be labor analgesia              Pertinent diagnostic labs and testing reviewed    Informed Consent:    Anesthetic plan and risks discussed with patient.

## 2019-12-27 NOTE — OR NURSING
1217: received to PACU via bed with LMA which was dc'd by Dr Del Valle upon arrival to PACU. Sleeping. Respirations spontaneous and unlabored. Prevena wound vac dressing to left groin CDI. Patent. No pedal pulse or post tib pulses even with the use of doppler  1250: Dr. Gage Valles notified about no pulses to left lower extremity. No pedal pulse  and post tibial pulses. He said it's okay. He will check on the patient later  1258: c/o left groin pain. Pain scale 7/10. Medicated. See MAR  1304: pain scale 6/10. Medicated. See MAR  1310: pain scale 5/10. Medicated. See MAR  1322: pain scale 4/10 and tolerable.  1337: report called to Patricia GOULD  1338: transported via bed to [place] [id number] by RN with O2 at 2 L / nc. Stable.

## 2019-12-27 NOTE — ASSESSMENT & PLAN NOTE
-Left lower extremity, plan for attempted revascularization with Dr. Valles 12/27  -12/28:  POD#1 from left femoral thromboendarterectomy and profundoplasty.  -12/28: no pulses palpated or auscultated with doppler over dorsalis pedus and posterior tibial points - Surgeon aware  -12/28: taken back to surgery; LEFT femoral artery angioplasty   -12/29: dorsal pedis and posterior tibial pulses auscultated with doppler  12/31. CT Angiography of LLE planned tomorrow.

## 2019-12-27 NOTE — PROGRESS NOTES
Critical PTT lab result was 171.8 at 2101. This value is within the defined limits of the Heparin Weight Based Protocol ordered by the physician for this patient. Heparin Weight Based Protocol will be followed for any adjustments, physician WAS NOT notified per protocol instructions.

## 2019-12-27 NOTE — ASSESSMENT & PLAN NOTE
-Secondary to vascular complication right lower extremity  -AKA done in [facility] in 2014

## 2019-12-27 NOTE — ED NOTES
"Report to transport.  All belongings with pt.  Pt reports he never received his \"cards\" from registration.  Registration to be questions when available.    "

## 2019-12-28 NOTE — PROGRESS NOTES
ID and insurance cards were found in patient's belongings bag. Patient is made aware of their location.

## 2019-12-28 NOTE — PROGRESS NOTES
Vascular Surgery     POD #1 s/p left femoral thromboendarterectomy and profundaplasty.    Good signal in profunda     Weak signal in anterior tibial artery     Patient reports persistent pain     Will observe for another couple days   If pain does not resolve will consider tibial bypass vs amputation depending if patient has distal target.    Follow    Gage Valles MD

## 2019-12-28 NOTE — CARE PLAN
Problem: Safety  Goal: Will remain free from injury  Outcome: PROGRESSING AS EXPECTED  Intervention: Provide assistance with mobility  Note:   Patient on bedrest with active order. Educated patient. Bed alarm on, high fall risk precautions in place      Problem: Pain Management  Goal: Pain level will decrease to patient's comfort goal  Outcome: PROGRESSING AS EXPECTED  Intervention: Follow pain managment plan developed in collaboration with patient and Interdisciplinary Team  Note:   Medicating with gabapentin and oxycodone for pain. Patient reports comfortable goal met

## 2019-12-28 NOTE — PROGRESS NOTES
[facility] Medicine Daily Progress Note    Date of Service  12/28/2019    Chief Complaint  69 y.o. male admitted 12/26/2019 with pain in left lower extremity    Hospital Course    Mr. Walker is a 69-year-old male with a PMH of PAD, right AKA in 2014, HTN, tobacco use, DM 2, HDL, who presented to ER on 12/2619 due to increasing pain and discoloration on his left lower extremity which started about a week ago.  Patient reports that pain and discoloration is started on his left heel then became more painful into the forefoot and toes.  Due to his right AKA, patient ambulates with a wheelchair only.  Patient also notes that he recently moved from the [facility] to [facility].  Patient also reports that he has has a history of liver and kidney disease which apparently resolved prior to admission.  Patient stop smoking approximately 6 years ago.  During evaluation in ER, patient found to have an acute on chronic left lower extremity arterial occlusion starting from the femoral area.  Patient started on a Heparin gtt, and vascular surgery, Dr. Valles, was consulted.  Patient denies any chest pain, no palpitations, and denies any prior heart history.  Patient was admitted into the general surgical unit.      Interval Problem Update  12/27/19:  Patient seen and examined this morning prior to procedure.  Right AKA noted, left lower extremity very tender to touch, discoloration from heel to calf.  Patient noted to have a procedure with vascular surgeon, Dr. Valles, for a left lower angiogram with possible intervention.  Patient kept n.p.o., Heparin gtt protocol in place with clarification from surgeon when to stop drip prior to procedure.  Otherwise, patient has no concerns at this time.  Pending results from a vascular procedure today.  Patient hyponatremic at 126, glucose at 347 which may be due to n.p.o. status and D5W infusion.  Creatinine at 2.58 and BUN at 55, which more likely be contributed from dehydration.  Will  monitor and treat after vascular procedure today.    12/28/19: Patient seen and examined today with no overnight episodes.  Patient reports tolerating the procedure with vascular surgery yesterday 12/27/2019.  Patient still complains of pain in the left lower extremity.  No palpable pulses palpated over dorsalis pedis and posterior tibial.  Doppler machine was also utilized on these pulse point was on the foot, no pulses heard with Doppler machine.  Vascular surgeon, Dr. Valles aware and guarded for any immediate intervention at this time as patient is POD#1 from left femoral thromboendarterectomy and profundoplasty.  Per vascular surgery, patient will be observed for a couple of days, if pain persist with no resolution, will consider tibial bypass versus amputation.  Patient notified and plan of care.  Patient will also be started on Flomax.  Bladder scan will be obtained with possible discontinuation of Kinney tomorrow.  Otherwise, laboratory work from this morning is unremarkable with no significant changes from previous results.  There are no significant changes from my previous ROS and PE from yesterday 12/27, please see note for further details.  Vital signs stable.    Consultants/Specialty  -Vascular surgery -Gage Valles    Code Status  Full    Disposition  TBD    Review of Systems  Review of Systems   Constitutional: Negative.  Negative for chills and fever.   HENT: Negative.    Eyes: Negative.    Respiratory: Negative.    Cardiovascular: Negative.    Gastrointestinal: Negative.    Genitourinary: Negative.    Musculoskeletal: Positive for myalgias.   Skin: Positive for itching.   Neurological: Negative.    Endo/Heme/Allergies: Negative.    Psychiatric/Behavioral: Negative.         Physical Exam  Temp:  [35.9 °C (96.6 °F)-37.4 °C (99.3 °F)] 37.4 °C (99.3 °F)  Pulse:  [61-87] 87  Resp:  [10-20] 16  BP: (136-189)/(57-86) 137/86  SpO2:  [91 %-100 %] 91 %    Physical Exam  Vitals signs and nursing note reviewed.    Constitutional:       Appearance: Normal appearance.   HENT:      Head: Normocephalic.      Nose: Nose normal.      Mouth/Throat:      Mouth: Mucous membranes are moist.      Pharynx: Oropharynx is clear.   Eyes:      Pupils: Pupils are equal, round, and reactive to light.   Neck:      Musculoskeletal: Normal range of motion and neck supple.   Cardiovascular:      Rate and Rhythm: Normal rate and regular rhythm.      Pulses: Normal pulses.      Heart sounds: Normal heart sounds.   Pulmonary:      Effort: Pulmonary effort is normal.      Breath sounds: Normal breath sounds.   Abdominal:      General: Abdomen is flat. Bowel sounds are normal.   Musculoskeletal:         General: Tenderness present.      Left lower leg: Edema present.        Legs:       Comments: RIGHT AKA  LEFT leg - discoloration and pain   Skin:     General: Skin is warm.      Capillary Refill: Capillary refill takes less than 2 seconds.      Findings: Erythema present.   Neurological:      General: No focal deficit present.      Mental Status: He is alert. Mental status is at baseline.   Psychiatric:         Mood and Affect: Mood normal.         Fluids    Intake/Output Summary (Last 24 hours) at 12/28/2019 1145  Last data filed at 12/28/2019 0305  Gross per 24 hour   Intake 727.27 ml   Output 2230 ml   Net -1502.73 ml       Laboratory  Recent Labs     12/27/19  0254 12/27/19  0448 12/28/19  0422   WBC 5.9 6.3 7.3   RBC 3.46* 3.52* 3.40*   HEMOGLOBIN 10.6* 10.6* 10.3*   HEMATOCRIT 31.1* 32.5* 31.1*   MCV 89.9 92.3 91.5   MCH 30.6 30.1 30.3   MCHC 34.1 32.6* 33.1*   RDW 44.6 46.8 46.5   PLATELETCT 196 192 204   MPV 10.0 10.2 10.2     Recent Labs     12/26/19  1031 12/27/19  0254 12/28/19  0422   SODIUM 129* 126* 132*   POTASSIUM 5.0 4.1 4.3   CHLORIDE 99 95* 101   CO2 16* 23 22   GLUCOSE 124* 347* 85   BUN 60* 55* 42*   CREATININE 2.99* 2.58* 2.06*   CALCIUM 9.3 8.0* 8.5     Recent Labs     12/26/19  1031 12/26/19  1947 12/27/19  0254   APTT 42.0*  171.8* 170.6*   INR 1.00  --   --          Recent Labs     12/27/19  0254   TRIGLYCERIDE 98   HDL 26*   LDL 41       Imaging  DX-PORTABLE FLUORO > 1 HOUR   Final Result      Portable fluoroscopy utilized for 39 seconds.         INTERPRETING LOCATION: [facility], [place], [address]      US-RENAL   Final Result      No hydronephrosis. No renal calculus.      US-EXTREMITY ARTERY LOWER UNILAT LEFT   Final Result      US-RIVAS SINGLE LEVEL BILAT   Final Result           Assessment/Plan  ADRIA (acute kidney injury) ([facility])  Assessment & Plan  -With unknown baseline, the patient stated that he had CKD in the past but it resolved  -Attempt to correct with bicarb drip, follow closely, ultrasound of the renal structures  -Cr. 2.58 ; will recheck and treat as needed  -12/28: Cr 2.06    Critical lower limb ischemia  Assessment & Plan  -Left lower extremity, plan for attempted revascularization with Dr. Valles 12/27  -12/28:  POD#1 from left femoral thromboendarterectomy and profundoplasty.  -12/28: no pulses palpated or auscultated with doppler over dorsalis pedus and posterior tibial points - Surgeon aware    Arterial occlusion due to arteriosclerosis  Assessment & Plan  -Of the left lower extremity, no dopplerable pulses, abnormal vascular study with occlusion in the femoral area  -Patient referred to vascular surgery, plan for intervention 12/27/19 with Dr. Valles  -Heparin drip - off prior to surgery on 12/27  -12/28: POD#1 from left femoral thromboendarterectomy and profundoplasty.  -Pain control    History of tobacco abuse  Assessment & Plan  -Extensive, the patient apparently quit 6 years ago    S/P AKA (above knee amputation), right ([facility])  Assessment & Plan  -Secondary to vascular complication right lower extremity  -AKA done in [facility] in 2014    Essential hypertension  Assessment & Plan  -History of, control multimodality and adjust as indicated    Dyslipidemia  Assessment & Plan  -On statin, continue    PAD (peripheral artery  disease) ([facility])  Assessment & Plan  -History of with right lower extremity amputation 2014   -history of extensive tobacco abuse       VTE prophylaxis: Heparin  --------------------------------------------------------------------------------------------------------------------------------------------------------------------------------------------------------------------  Electronically signed by:  DEBI Villarreal, MSN, APRN, FNP-C  Hospitalist Services  [facility]  [phone]  [email or web address]  12/28/19    5476

## 2019-12-28 NOTE — PROGRESS NOTES
Bedside report received. Assessment complete.  A&O x 4. 1L NC Sating 92-96%. Will wean off as tolerated. Patient calls appropriately.  Patient on bedrest with active order. Educated and verbalized understanding  Patient has 7/10 pain. Medicating per MAR with oxycodone.  Denies N&V. Tolerating clear liquid diet.  Surgical incision on right groin closed with prevena. No signs of airleak.  Right BKA noted  Pulses absent on LLE. Warm to touch. Patient reports numbness, leg is tender  Pinky toe black  + void with rosas in place for retention, + flatus, PTA BM.  Patient denies SOB.  Review plan with of care with patient. Call light and personal belongings with in reach. Hourly rounding in place. All needs met at this time.

## 2019-12-28 NOTE — OP REPORT
12/27/19    OPERATIVE NOTE    PRE-OPERATIVE DIAGNOSIS -     1.  Severe peripheral arterial disease  2.  Acute on chronic ischemia left lower extremity with rest pain    POST-OPERATIVE DIAGNOSIS -same    PROCEDURE PERFORMED -     1.  Left femoral artery thrombectomy  2.  Left common femoral endarterectomy with patch angioplasty  3.  Left profunda femoris endarterectomy with patch angioplasty  4.  Catheter placement left femoral artery  5.  Pelvic angiogram    SURGEON - Gage Valles M.D.    ASSISTANT - MELQUIADES Chambers    TYPE OF ANESTHESIA - General     ANESTHESIOLOGIST  -associated      SPECIMENS -none    INDICATIONS - 69 y.o. vasculopath who presents to the emergency room with worsening rest pain in his left foot.  The patient has a contralateral above-knee amputation which was performed in [place] several years ago.  The patient has chronic renal insufficiency.  Patient is being taken to the operating room for left femoral thromboendarterectomy and angiogram based on noninvasive arterial studies.      PROCEDURE IN DETAIL -     Patient was taken to the operating suite placed in the supine position.  After adequate anesthesia the patient's abdomen groins and thighs were prepped and draped in sterile fashion.  An incision was made in the left groin.  Patient has a previous groin incision suggesting previous surgery although the patient does not know what has occurred previously in that region.  Incision was carried down to the subcutaneous tissue and the left common femoral artery was dissected from the surrounding tissue and isolated appears as though the patient had a previous left femoral patch angioplasty and a femoral to femoral graft.  The left profunda femoris artery was also circumferentially dissected and isolated and dissected out distally to his branches.  7000 use of heparin was administered and after appropriate period of time the vessels were clamped and a longitudinal arteriotomy was made in the  common femoral artery and carried down onto the profunda femoris artery using Webb scissors.  There was fresh clot noted in the left femoral region as well as significant amount of atheromatous atherosclerotic disease.  The origin of the profunda femoris artery as well as the superficial femoral artery were occluded.  #4 Ziggy catheter was advanced proximally up the iliac system with retrieval of some thrombus distally and good inflow was established.  Catheter was then placed through the profunda femoris artery and no significant thrombus was retrieved from the profunda femoris artery.  Next an endarterectomy was performed using a freer elevator and clearing of the atheromatous plaque in the left common femoral and profunda femoris arteries was performed.  The previous patch was then resected and a new bovine patch was sewn in circumferentially using 5-0 Prolene sutures in running fashion.  Next a wire was advanced retrograde up the iliac system followed by a 7 Lithuanian sheath.  A retrograde angiogram was performed demonstrated no significant stenoses within the iliac system.  The sheath was then removed and the patch was flushed and then completed.  A minimum of contrast was used due to the patient's significant renal insufficiency.  Once flow was established back through the profunda femoris artery hemostasis was assured.  2-0 Vicryl running sutures were then used to reapproximate the subcutaneous tissue additional 2-0 Vicryl sutures were placed for the subcutaneous tissue and a 4-0 strata fix suture was then used to reapproximate the skin incision.  A Jaki was placed over the incision.  With the reestablishment of flow through the profunda femoris artery I am anticipating that the patient's rest pain will be resolved without the need for a distal revascularization procedure.      Gage Valles M.D.

## 2019-12-29 NOTE — CARE PLAN
Problem: Safety  Goal: Will remain free from injury  Outcome: PROGRESSING AS EXPECTED   Patient uses call light appropriately. Bed locked and in lowest position. Call light within reach.    Problem: Pain Management  Goal: Pain level will decrease to patient's comfort goal  Outcome: PROGRESSING AS EXPECTED

## 2019-12-29 NOTE — PROGRESS NOTES
Bedside report received.  Assessment complete.  A&O x 4. Patient calls appropriately.  Patient on bedrest per order.  Patient has 7/10 pain. PRN pain medication given.  Denies N&V. Tolerating Clear liquid diet.  Surgical incision to right groin with prevena in place.   Previous Right BKA.  + void via rosas for retention, + flatus, PTA BM.  Patient denies SOB.  Patient's letf leg tender to touch, black spot on pinky toe.  Review plan with of care with patient. Call light and personal belongings with in reach. Hourly rounding in place. All needs met at this time.

## 2019-12-29 NOTE — CARE PLAN
Problem: Communication  Goal: The ability to communicate needs accurately and effectively will improve  Outcome: PROGRESSING AS EXPECTED     Problem: Safety  Goal: Will remain free from injury  Outcome: PROGRESSING AS EXPECTED  Goal: Will remain free from falls  Outcome: PROGRESSING AS EXPECTED     Problem: Venous Thromboembolism (VTW)/Deep Vein Thrombosis (DVT) Prevention:  Goal: Patient will participate in Venous Thrombosis (VTE)/Deep Vein Thrombosis (DVT)Prevention Measures  Outcome: PROGRESSING AS EXPECTED     Problem: Pain Management  Goal: Pain level will decrease to patient's comfort goal  Outcome: PROGRESSING AS EXPECTED     Problem: Skin Integrity  Goal: Risk for impaired skin integrity will decrease  Outcome: PROGRESSING AS EXPECTED     Problem: Urinary Elimination:  Goal: Ability to reestablish a normal urinary elimination pattern will improve  Outcome: PROGRESSING SLOWER THAN EXPECTED  Note:   Started flomax will remove indwelling catheter tomorrow to see if patient can void.

## 2019-12-29 NOTE — PROGRESS NOTES
Vascular Surgery     Still reporting pain in foot   Foot warm with doppler signal present     Foot better perfused after surgery     Pain may be due to reperfusion     Renal function improving     Recommend continue observation for now. Anticipate clinical improvement     OOB to chair     Gage Valles MD

## 2019-12-29 NOTE — PROGRESS NOTES
[facility] Medicine Daily Progress Note    Date of Service  12/29/2019    Chief Complaint  69 y.o. male admitted 12/26/2019 with pain in left lower extremity    Hospital Course    Mr. Walker is a 69-year-old male with a PMH of PAD, right AKA in 2014, HTN, tobacco use, DM 2, HDL, who presented to ER on 12/2619 due to increasing pain and discoloration on his left lower extremity which started about a week ago.  Patient reports that pain and discoloration is started on his left heel then became more painful into the forefoot and toes.  Due to his right AKA, patient ambulates with a wheelchair only.  Patient also notes that he recently moved from the [facility] to [facility].  Patient also reports that he has has a history of liver and kidney disease which apparently resolved prior to admission.  Patient stop smoking approximately 6 years ago.  During evaluation in ER, patient found to have an acute on chronic left lower extremity arterial occlusion starting from the femoral area.  Patient started on a Heparin gtt, and vascular surgery, Dr. Valles, was consulted.  Patient denies any chest pain, no palpitations, and denies any prior heart history.  Patient was admitted into the general surgical unit.      Interval Problem Update  12/27/19:  Patient seen and examined this morning prior to procedure.  Right AKA noted, left lower extremity very tender to touch, discoloration from heel to calf.  Patient noted to have a procedure with vascular surgeon, Dr. Valles, for a left lower angiogram with possible intervention.  Patient kept n.p.o., Heparin gtt protocol in place with clarification from surgeon when to stop drip prior to procedure.  Otherwise, patient has no concerns at this time.  Pending results from a vascular procedure today.  Patient hyponatremic at 126, glucose at 347 which may be due to n.p.o. status and D5W infusion.  Creatinine at 2.58 and BUN at 55, which more likely be contributed from dehydration.  Will  monitor and treat after vascular procedure today.    12/28/19: Patient seen and examined today with no overnight episodes.  Patient reports tolerating the procedure with vascular surgery yesterday 12/27/2019.  Patient still complains of pain in the left lower extremity.  No palpable pulses palpated over dorsalis pedis and posterior tibial.  Doppler machine was also utilized on these pulse point was on the foot, no pulses heard with Doppler machine.  Vascular surgeon, Dr. Valles aware and guarded for any immediate intervention at this time as patient is POD#1 from left femoral thromboendarterectomy and profundoplasty.  Per vascular surgery, patient will be observed for a couple of days, if pain persist with no resolution, will consider tibial bypass versus amputation.  Patient notified and plan of care.  Patient will also be started on Flomax.  Bladder scan will be obtained with possible discontinuation of Kinney tomorrow.  Otherwise, laboratory work from this morning is unremarkable with no significant changes from previous results.  There are no significant changes from my previous ROS and PE from yesterday 12/27, please see note for further details.  Vital signs stable.    12/29/19: Patient seen and examined today with no overnight episodes.  Patient still complains of some foot pain, which is more likely reperfusion pain.  Patient had undergone an angioplasty with vascular surgeon, Dr. Valles yesterday 12/28/2019.  Dorsal pedis and posterior tibial pulses auscultated with the use of Doppler.  Left foot warm to touch, but .  Per recommendations of vascular surgeon, we will continue to monitor.  Bladder trial to start today, if patient tolerates Kinney will be D/C'd.  Notified bedside RN.  Otherwise, there are no significant changes from my previous ROS or PE.  Lab work from this morning was unremarkable with no significant changes from previous results.  VSS overnight.    Consultants/Specialty  -Vascular  surgery -Gage Valles    Code Status  Full    Disposition  TBD    Review of Systems  Review of Systems   Constitutional: Negative.  Negative for chills and fever.   HENT: Negative.    Eyes: Negative.    Respiratory: Negative.    Cardiovascular: Negative.    Gastrointestinal: Negative.    Genitourinary: Negative.    Musculoskeletal: Positive for myalgias.   Skin: Positive for itching.   Neurological: Negative.    Endo/Heme/Allergies: Negative.    Psychiatric/Behavioral: Negative.         Physical Exam  Temp:  [36.7 °C (98.1 °F)-37.2 °C (99 °F)] 36.9 °C (98.4 °F)  Pulse:  [76-88] 82  Resp:  [13-18] 18  BP: (126-171)/(62-72) 171/69  SpO2:  [92 %-97 %] 96 %    Physical Exam  Vitals signs and nursing note reviewed.   Constitutional:       Appearance: Normal appearance.   HENT:      Head: Normocephalic.      Nose: Nose normal.      Mouth/Throat:      Mouth: Mucous membranes are moist.      Pharynx: Oropharynx is clear.   Eyes:      Pupils: Pupils are equal, round, and reactive to light.   Neck:      Musculoskeletal: Normal range of motion and neck supple.   Cardiovascular:      Rate and Rhythm: Normal rate and regular rhythm.      Pulses: Normal pulses.      Heart sounds: Normal heart sounds.   Pulmonary:      Effort: Pulmonary effort is normal.      Breath sounds: Normal breath sounds.   Abdominal:      General: Abdomen is flat. Bowel sounds are normal.   Musculoskeletal:         General: Tenderness present.      Left lower leg: Edema present.        Legs:       Comments: RIGHT AKA  LEFT leg - discoloration and pain   Skin:     General: Skin is warm.      Capillary Refill: Capillary refill takes less than 2 seconds.      Findings: Erythema present.   Neurological:      General: No focal deficit present.      Mental Status: He is alert. Mental status is at baseline.   Psychiatric:         Mood and Affect: Mood normal.         Fluids    Intake/Output Summary (Last 24 hours) at 12/29/2019 1053  Last data filed at 12/29/2019  0726  Gross per 24 hour   Intake 979 ml   Output 3000 ml   Net -2021 ml       Laboratory  Recent Labs     12/27/19  0448 12/28/19  0422 12/29/19  0556   WBC 6.3 7.3 6.1   RBC 3.52* 3.40* 2.99*   HEMOGLOBIN 10.6* 10.3* 9.2*   HEMATOCRIT 32.5* 31.1* 27.4*   MCV 92.3 91.5 91.6   MCH 30.1 30.3 30.8   MCHC 32.6* 33.1* 33.6*   RDW 46.8 46.5 46.8   PLATELETCT 192 204 203   MPV 10.2 10.2 10.4     Recent Labs     12/27/19  0254 12/28/19  0422 12/29/19  0556   SODIUM 126* 132* 136   POTASSIUM 4.1 4.3 4.3   CHLORIDE 95* 101 107   CO2 23 22 24   GLUCOSE 347* 85 92   BUN 55* 42* 29*   CREATININE 2.58* 2.06* 1.62*   CALCIUM 8.0* 8.5 8.6     Recent Labs     12/26/19  1947 12/27/19  0254   APTT 171.8* 170.6*         Recent Labs     12/27/19  0254   TRIGLYCERIDE 98   HDL 26*   LDL 41       Imaging  DX-PORTABLE FLUORO > 1 HOUR   Final Result      Portable fluoroscopy utilized for 39 seconds.         INTERPRETING LOCATION: [address] [place] [address], [id number]      US-RENAL   Final Result      No hydronephrosis. No renal calculus.      US-EXTREMITY ARTERY LOWER UNILAT LEFT   Final Result      US-RIVAS SINGLE LEVEL BILAT   Final Result           Assessment/Plan  ADRIA (acute kidney injury) (HCC)  Assessment & Plan  -With unknown baseline, the patient stated that he had CKD in the past but it resolved  -Attempt to correct with bicarb drip, follow closely, ultrasound of the renal structures  -Cr. 2.58 ; will recheck and treat as needed  -12/28: Cr 2.06    Critical lower limb ischemia  Assessment & Plan  -Left lower extremity, plan for attempted revascularization with Dr. Valles 12/27  -12/28:  POD#1 from left femoral thromboendarterectomy and profundoplasty.  -12/28: no pulses palpated or auscultated with doppler over dorsalis pedus and posterior tibial points - Surgeon aware  -12/28: taken back to surgery; LEFT femoral artery angioplasty   -12/29: dorsal pedis and posterior tibial pulses auscultated with doppler    Arterial occlusion due to  arteriosclerosis  Assessment & Plan  -Of the left lower extremity, no dopplerable pulses, abnormal vascular study with occlusion in the femoral area  -Patient referred to vascular surgery, plan for intervention 12/27/19 with Dr. Valles  -Heparin drip - off prior to surgery on 12/27  -12/28: POD#1 from left femoral thromboendarterectomy and profundoplasty.  -Pain control    History of tobacco abuse  Assessment & Plan  -Extensive, the patient apparently quit 6 years ago    S/P AKA (above knee amputation), right ([facility])  Assessment & Plan  -Secondary to vascular complication right lower extremity  -AKA done in [facility] in 2014    Essential hypertension  Assessment & Plan  -History of, control multimodality and adjust as indicated    Dyslipidemia  Assessment & Plan  -On statin, continue    PAD (peripheral artery disease) ([facility])  Assessment & Plan  -History of with right lower extremity amputation 2014   -history of extensive tobacco abuse       VTE prophylaxis: Heparin  --------------------------------------------------------------------------------------------------------------------------------------------------------------------------------------------------------------------  Electronically signed by:  DEBI Villarreal, MSN, APRN, FNP-C  Hospitalist Services  [facility]  [phone]  [email or web address]  12/29/19    3213

## 2019-12-30 NOTE — PROGRESS NOTES
Vascular Surgery     Awake Alert  Reports persistent pain left foot     Plan - Will return to OR Wednesday for angio and possible distal revascularization     Gage Valles MD

## 2019-12-30 NOTE — PROGRESS NOTES
[facility] Medicine Daily Progress Note    Date of Service  12/30/2019    Chief Complaint  69 y.o. male admitted 12/26/2019 with pain in left lower extremity    Hospital Course    Mr. Walker is a 69-year-old male with a PMH of PAD, right AKA in 2014, HTN, tobacco use, DM 2, HDL, who presented to ER on 12/2619 due to increasing pain and discoloration on his left lower extremity which started about a week ago.  Patient reports that pain and discoloration is started on his left heel then became more painful into the forefoot and toes.  Due to his right AKA, patient ambulates with a wheelchair only.  Patient also notes that he recently moved from the [facility] to [facility].  Patient also reports that he has has a history of liver and kidney disease which apparently resolved prior to admission.  Patient stop smoking approximately 6 years ago.  During evaluation in ER, patient found to have an acute on chronic left lower extremity arterial occlusion starting from the femoral area.  Patient started on a Heparin gtt, and vascular surgery, Dr. Valles, was consulted.  Patient denies any chest pain, no palpitations, and denies any prior heart history.  Patient was admitted into the general surgical unit.      Interval Problem Update  12/27/19:  Patient seen and examined this morning prior to procedure.  Right AKA noted, left lower extremity very tender to touch, discoloration from heel to calf.  Patient noted to have a procedure with vascular surgeon, Dr. Valles, for a left lower angiogram with possible intervention.  Patient kept n.p.o., Heparin gtt protocol in place with clarification from surgeon when to stop drip prior to procedure.  Otherwise, patient has no concerns at this time.  Pending results from a vascular procedure today.  Patient hyponatremic at 126, glucose at 347 which may be due to n.p.o. status and D5W infusion.  Creatinine at 2.58 and BUN at 55, which more likely be contributed from dehydration.  Will  monitor and treat after vascular procedure today.    12/28/19: Patient seen and examined today with no overnight episodes.  Patient reports tolerating the procedure with vascular surgery yesterday 12/27/2019.  Patient still complains of pain in the left lower extremity.  No palpable pulses palpated over dorsalis pedis and posterior tibial.  Doppler machine was also utilized on these pulse point was on the foot, no pulses heard with Doppler machine.  Vascular surgeon, Dr. Valles aware and guarded for any immediate intervention at this time as patient is POD#1 from left femoral thromboendarterectomy and profundoplasty.  Per vascular surgery, patient will be observed for a couple of days, if pain persist with no resolution, will consider tibial bypass versus amputation.  Patient notified and plan of care.  Patient will also be started on Flomax.  Bladder scan will be obtained with possible discontinuation of Kinney tomorrow.  Otherwise, laboratory work from this morning is unremarkable with no significant changes from previous results.  There are no significant changes from my previous ROS and PE from yesterday 12/27, please see note for further details.  Vital signs stable.    12/29/19: Patient seen and examined today with no overnight episodes.  Patient still complains of some foot pain, which is more likely reperfusion pain.  Patient had undergone an angioplasty with vascular surgeon, Dr. Valles yesterday 12/28/2019.  Dorsal pedis and posterior tibial pulses auscultated with the use of Doppler.  Left foot warm to touch, but .  Per recommendations of vascular surgeon, we will continue to monitor.  Bladder trial to start today, if patient tolerates Kinney will be D/C'd.  Notified bedside RN.  Otherwise, there are no significant changes from my previous ROS or PE.  Lab work from this morning was unremarkable with no significant changes from previous results.  VSS overnight.    12/30: Patient seen and examined  today with no overnight episodes.  Patient still complains of left foot pain.  Successful bladder trial yesterday 12/29, Gregoria Rollins, patient voiding.  Per vascular surgeon, Dr. Valles, patient to go back on Wednesday, 1/1/2024 angiogram of the left foot possible intervention due to recurrent pain.  Patient notified and plan of care.  Pain management in place.  ADRIA resolved with IVF therapy, we will continue to monitor.  There are no significant changes in my previous ROS or PE from previous note, please see previous note for further details.  Otherwise, lab work when this morning was unremarkable with no significant changes from previous results.  VSS overnight    Consultants/Specialty  -Vascular surgery -Gage Valles    Code Status  Full    Disposition  TBD    Review of Systems  Review of Systems   Constitutional: Negative.  Negative for chills and fever.   HENT: Negative.    Eyes: Negative.    Respiratory: Negative.    Cardiovascular: Negative.    Gastrointestinal: Negative.    Genitourinary: Negative.    Musculoskeletal: Positive for myalgias.   Skin: Positive for itching.   Neurological: Negative.    Endo/Heme/Allergies: Negative.    Psychiatric/Behavioral: Negative.         Physical Exam  Temp:  [36.7 °C (98.1 °F)-37.4 °C (99.3 °F)] 36.9 °C (98.5 °F)  Pulse:  [70-86] 76  Resp:  [13-18] 18  BP: (122-171)/(56-84) 149/68  SpO2:  [92 %-98 %] 92 %    Physical Exam  Vitals signs and nursing note reviewed.   Constitutional:       Appearance: Normal appearance.   HENT:      Head: Normocephalic.      Nose: Nose normal.      Mouth/Throat:      Mouth: Mucous membranes are moist.      Pharynx: Oropharynx is clear.   Eyes:      Pupils: Pupils are equal, round, and reactive to light.   Neck:      Musculoskeletal: Normal range of motion and neck supple.   Cardiovascular:      Rate and Rhythm: Normal rate and regular rhythm.      Pulses: Normal pulses.      Heart sounds: Normal heart sounds.   Pulmonary:      Effort: Pulmonary  effort is normal.      Breath sounds: Normal breath sounds.   Abdominal:      General: Abdomen is flat. Bowel sounds are normal.   Musculoskeletal:         General: Tenderness present.      Left lower leg: Edema present.        Legs:       Comments: RIGHT AKA  LEFT leg - discoloration and pain   Skin:     General: Skin is warm.      Capillary Refill: Capillary refill takes less than 2 seconds.      Findings: Erythema present.   Neurological:      General: No focal deficit present.      Mental Status: He is alert. Mental status is at baseline.   Psychiatric:         Mood and Affect: Mood normal.         Fluids    Intake/Output Summary (Last 24 hours) at 12/30/2019 1324  Last data filed at 12/30/2019 1235  Gross per 24 hour   Intake 1946 ml   Output 3175 ml   Net -1229 ml       Laboratory  Recent Labs     12/28/19  0422 12/29/19  0556 12/30/19  0512   WBC 7.3 6.1 5.8   RBC 3.40* 2.99* 2.89*   HEMOGLOBIN 10.3* 9.2* 8.8*   HEMATOCRIT 31.1* 27.4* 27.4*   MCV 91.5 91.6 94.8   MCH 30.3 30.8 30.4   MCHC 33.1* 33.6* 32.1*   RDW 46.5 46.8 47.2   PLATELETCT 204 203 182   MPV 10.2 10.4 10.1     Recent Labs     12/28/19  0422 12/29/19  0556 12/30/19  0512   SODIUM 132* 136 133*   POTASSIUM 4.3 4.3 4.1   CHLORIDE 101 107 105   CO2 22 24 23   GLUCOSE 85 92 85   BUN 42* 29* 19   CREATININE 2.06* 1.62* 1.42*   CALCIUM 8.5 8.6 8.4*                   Imaging  DX-PORTABLE FLUORO > 1 HOUR   Final Result      Portable fluoroscopy utilized for 39 seconds.         INTERPRETING LOCATION: [address] [place] [address], [address]      US-RENAL   Final Result      No hydronephrosis. No renal calculus.      US-EXTREMITY ARTERY LOWER UNILAT LEFT   Final Result      US-RIVAS SINGLE LEVEL BILAT   Final Result           Assessment/Plan  ADRIA (acute kidney injury) (HCC)  Assessment & Plan  -With unknown baseline, the patient stated that he had CKD in the past but it resolved  -Attempt to correct with bicarb drip, follow closely, ultrasound of the renal  structures  -Cr. 2.58 ; will recheck and treat as needed  -12/28: Cr 2.06  -12/30 Cr 1.42    Critical lower limb ischemia  Assessment & Plan  -Left lower extremity, plan for attempted revascularization with Dr. Valles 12/27  -12/28:  POD#1 from left femoral thromboendarterectomy and profundoplasty.  -12/28: no pulses palpated or auscultated with doppler over dorsalis pedus and posterior tibial points - Surgeon aware  -12/28: taken back to surgery; LEFT femoral artery angioplasty   -12/29: dorsal pedis and posterior tibial pulses auscultated with doppler    Arterial occlusion due to arteriosclerosis  Assessment & Plan  -Of the left lower extremity, no dopplerable pulses, abnormal vascular study with occlusion in the femoral area  -Patient referred to vascular surgery, plan for intervention 12/27/19 with Dr. Valles  -Heparin drip - off prior to surgery on 12/27  -12/28: POD#1 from left femoral thromboendarterectomy and profundoplasty.  -Pain control  -Per Vascular Surgery, pt to go back to OR for LEFT LE angiogram and possible intervention    History of tobacco abuse  Assessment & Plan  -Extensive, the patient apparently quit 6 years ago    S/P AKA (above knee amputation), right ([facility])  Assessment & Plan  -Secondary to vascular complication right lower extremity  -AKA done in [facility] in 2014    Essential hypertension  Assessment & Plan  -History of, control multimodality and adjust as indicated    Dyslipidemia  Assessment & Plan  -On statin, continue    PAD (peripheral artery disease) ([facility])  Assessment & Plan  -History of with right lower extremity amputation 2014   -history of extensive tobacco abuse       VTE prophylaxis: Heparin SQ  --------------------------------------------------------------------------------------------------------------------------------------------------------------------------------------------------------------------  Electronically signed by:  DEBI Villarreal, MSN, APRN,  FNP-C  Hospitalist Services  [facility]  [phone]  [email or web address]  12/30/19   1327

## 2019-12-30 NOTE — CARE PLAN
Problem: Venous Thromboembolism (VTW)/Deep Vein Thrombosis (DVT) Prevention:  Goal: Patient will participate in Venous Thrombosis (VTE)/Deep Vein Thrombosis (DVT)Prevention Measures  Outcome: PROGRESSING AS EXPECTED  Note:   Pulses heard with doppler, LLE is warm to touch, MD at bedside to discuss plan of care this week with pt regarding LLE     Problem: Pain Management  Goal: Pain level will decrease to patient's comfort goal  Outcome: PROGRESSING AS EXPECTED  Note:   PO PRN pain med dose increased, pt tolerating well, IV breakthrough available, pt tolerating well

## 2019-12-30 NOTE — PROGRESS NOTES
Bedside report received.  Assessment complete.  A&O x 4. Patient calls appropriately.  Patient up with 2 assist to the chair, patient on bed rest.   Patient has 7/10 pain. PRN pain medication given.  Denies N&V. Tolerating full liquid diet.  Surgical incision with prevena to L terriion.  + void rosas removed today, + flatus, - BM.  Patient denies SOB.    Review plan with of care with patient. Call light and personal belongings with in reach. Hourly rounding in place. All needs met at this time.

## 2019-12-30 NOTE — CARE PLAN
Problem: Knowledge Deficit  Goal: Knowledge of disease process/condition, treatment plan, diagnostic tests, and medications will improve  Outcome: PROGRESSING AS EXPECTED  Note:   Pt updated on plan of care and medications, pt educated on OOB privileges and educated about voiding trial     Problem: Pain Management  Goal: Pain level will decrease to patient's comfort goal  Outcome: PROGRESSING SLOWER THAN EXPECTED  Note:   PO PRN pain medication being administered, IV breakthrough being administered too, pt states iv breakthrough is helping control pain

## 2019-12-30 NOTE — PROGRESS NOTES
Bedside report received. Assessment completed.  Pt is A&O x4. Pt on room air.   Pain 7/10. Medicating PRN per MAR  Denies nausea.   + numbness & tingling to LLE  Right AKA  LLE is warm to touch, pedal pulses heard with doppler, foot is red and flaky with small black scab/wound on 5th toe.   Prevena wound vac in place to L groin, CDI, no signs of air leak.   Last BM PTA. +flatus, +void.  Full liquid diet. Pt has decreased appetite.   Pt up to chair with 2x assist. Tolerates well.   Call light within reach. All needs met at this time. Fall Precautions and hourly rounding in place.

## 2019-12-30 NOTE — THERAPY
Physical Therapy Contact Note    Physical therapy order received. Physical therapy evaluation attempted. Pt reporting difficulty with transfers 2/2 persistent L foot pain. Per chart review, pt is returning to OR Wednesday for angio and possible distal revascularization. Will defer PT eval until post-op for most accurate assessment of function.     Siena Edouard, PT, DPT  [phone]

## 2019-12-30 NOTE — CARE PLAN
Problem: Safety  Goal: Will remain free from injury  Outcome: PROGRESSING AS EXPECTED   Patient uses call light appropriately. Bed locked and in lowest position. Call light within reach.

## 2019-12-30 NOTE — PROGRESS NOTES
Kinney catheter d/c at 1345, pt educated to void by 1945 per voiding trial. Pt tolerated well. Pt educated on importance of oral hydration. All needs met at this time

## 2019-12-31 NOTE — CARE PLAN
Problem: Safety  Goal: Will remain free from injury  Outcome: PROGRESSING AS EXPECTED  Note:   Patient free from accidental injury at this time. Patient calls appropriately. Safety precautions in place including bed alarm. Hourly rounding in place.

## 2019-12-31 NOTE — DISCHARGE PLANNING
Anticipated Discharge Disposition: TBD    Action:     RN CM spoke with pt's daughter Magui via phone.  Magui states pt's condition has progressed to the point she is no longer able to care for him at her home in [place] as she is a single mother who works 14 hour days in [employer].  Magui requested LUIS FERNANDO GUEVARA coordinate with pt's sister to determine where patient will reside upon discharge.     RN CM spoke with pt's sister Liza ([phone]) who stated she is pt's POA, rep payee, and makes all decisions for patient. RN CM requested a copy of DPOA paperwork to upload into pt's EMR; Liza stated she is waiting for it to be mailed to her and will send in a copy as soon as she receives it.    Liza requesting referrals be sent to all local SNFs.  RN CM advised her referrals would be sent upon determination of discharge disposition.  Pt is pending angiogram on 1/1/19 and we will need PT OT to evaluate patient to identify his needs prior to discharging.  Assured Liza we would notify her when determination was made to obtain choice for referrals. Advised her we will also need to know what the patient's plan after SNF/Rehab would be.  Explained facilities will want to know the long term plan. Liza stated she would start looking at group homes and requested a list be emailed to her. RN CM sent list via email.     Barriers to Discharge: Medical clearance pending.   Angiogram planned for 1/1/20.   PT OT evaluations pending for discharge disposition determination.     Plan: Await discharge disposition determination. LUIS FERNANDO GUEVARA will continue to follow and assist with discharge planning.

## 2019-12-31 NOTE — PROGRESS NOTES
Vascular surgery     Persistent pain left foot despite left femoral artery endarterectomy     Plan - To OR tomorrow for angiogram and possible distal revascularization     Gage Valles MD

## 2019-12-31 NOTE — PROGRESS NOTES
Pt is A&Ox4.   Reports pain to LLE, medicated per MAR  (R) AKA. Pt is WC bound at baseline. Requires x 1 assist up to BSC. Bed alarm on.   On RA, denies SOB or chest pain; achieves 1000 on IS.   Normoactive BS x 4. Tolerating full liquid diet, however does have a poor appetite. Denies nausea/vomiting.   + flatus, LBM 12/30  + void   (L)LE with noted redness and swelling. Eschar noted on 5th digit on (L) foot.   PIV running IVF  Updated on POC. Belongings and call light within reach. All needs met at this time.

## 2019-12-31 NOTE — PROGRESS NOTES
Bedside report received.  Assessment complete.  A&O x 4. Patient calls appropriately.  Patient up with one assist to pivot. Bed alarm on.   Patient has 5/10 pain. Recently medicated  Denies N&V. Tolerating diet.  Surgical incision to L groin, prevena in place  R previous AKA  + void, + flatus, 12/31 BM.  Patient denies SOB.  Review plan with of care with patient. Call light and personal belongings with in reach. Hourly rounding in place. All needs met at this time.

## 2019-12-31 NOTE — PROGRESS NOTES
[facility] Medicine Daily Progress Note    Date of Service  12/31/2019    Chief Complaint  69 y.o. male admitted 12/26/2019 with pain in left lower extremity    Hospital Course    Mr. Walker is a 69-year-old male with a PMH of PAD, right AKA in 2014, HTN, tobacco use, DM 2, HDL, who presented to ER on 12/2619 due to increasing pain and discoloration on his left lower extremity which started about a week ago.  Patient reports that pain and discoloration is started on his left heel then became more painful into the forefoot and toes.  Due to his right AKA, patient ambulates with a wheelchair only.  Patient also notes that he recently moved from the [facility] to [facility].  Patient also reports that he has has a history of liver and kidney disease which apparently resolved prior to admission.  Patient stop smoking approximately 6 years ago.  During evaluation in ER, patient found to have an acute on chronic left lower extremity arterial occlusion starting from the femoral area.  Patient started on a Heparin gtt, and vascular surgery, Dr. Valles, was consulted.  Patient denies any chest pain, no palpitations, and denies any prior heart history.  Patient was admitted into the general surgical unit.      Interval Problem Update  12/27/19:  Patient seen and examined this morning prior to procedure.  Right AKA noted, left lower extremity very tender to touch, discoloration from heel to calf.  Patient noted to have a procedure with vascular surgeon, Dr. Valles, for a left lower angiogram with possible intervention.  Patient kept n.p.o., Heparin gtt protocol in place with clarification from surgeon when to stop drip prior to procedure.  Otherwise, patient has no concerns at this time.  Pending results from a vascular procedure today.  Patient hyponatremic at 126, glucose at 347 which may be due to n.p.o. status and D5W infusion.  Creatinine at 2.58 and BUN at 55, which more likely be contributed from dehydration.  Will  monitor and treat after vascular procedure today.  12/28/19: Patient seen and examined today with no overnight episodes.  Patient reports tolerating the procedure with vascular surgery yesterday 12/27/2019.  Patient still complains of pain in the left lower extremity.  No palpable pulses palpated over dorsalis pedis and posterior tibial.  Doppler machine was also utilized on these pulse point was on the foot, no pulses heard with Doppler machine.  Vascular surgeon, Dr. Valles aware and guarded for any immediate intervention at this time as patient is POD#1 from left femoral thromboendarterectomy and profundoplasty.  Per vascular surgery, patient will be observed for a couple of days, if pain persist with no resolution, will consider tibial bypass versus amputation.  Patient notified and plan of care.  Patient will also be started on Flomax.  Bladder scan will be obtained with possible discontinuation of Kinney tomorrow.  Otherwise, laboratory work from this morning is unremarkable with no significant changes from previous results.  There are no significant changes from my previous ROS and PE from yesterday 12/27, please see note for further details.  Vital signs stable.  12/29/19: Patient seen and examined today with no overnight episodes.  Patient still complains of some foot pain, which is more likely reperfusion pain.  Patient had undergone an angioplasty with vascular surgeon, Dr. Valles yesterday 12/28/2019.  Dorsal pedis and posterior tibial pulses auscultated with the use of Doppler.  Left foot warm to touch, but .  Per recommendations of vascular surgeon, we will continue to monitor.  Bladder trial to start today, if patient tolerates Kinney will be D/C'd.  Notified bedside RN.  Otherwise, there are no significant changes from my previous ROS or PE.  Lab work from this morning was unremarkable with no significant changes from previous results.  VSS overnight.  12/30: Patient seen and examined today  with no overnight episodes.  Patient still complains of left foot pain.  Successful bladder trial yesterday 12/29, Gregoria KUMAR'shree, patient voiding.  Per vascular surgeon, Dr. Valles, patient to go back on Wednesday, 1/1/2024 angiogram of the left foot possible intervention due to recurrent pain.  Patient notified and plan of care.  Pain management in place.  ADRIA resolved with IVF therapy, we will continue to monitor.  There are no significant changes in my previous ROS or PE from previous note, please see previous note for further details.  Otherwise, lab work when this morning was unremarkable with no significant changes from previous results.  VSS overnight    12/31. Slightly anxious, he is hoping his L leg could be saved. He had R AKA already.  Currently he is planned for CT Angiography of his L leg for revision as he had LLE endarterectomy already  Options given to him include bypass for distal circulation, distal endarterectomy versus amputation    Consultants/Specialty  -Vascular surgery -Gage Valles    Code Status  Full    Disposition  He is open to skilled  PT/OT ordered    Review of Systems  Review of Systems   Constitutional: Negative.  Negative for chills and fever.   HENT: Negative.    Eyes: Negative.    Respiratory: Negative.    Cardiovascular: Negative.    Gastrointestinal: Negative.    Genitourinary: Negative.    Musculoskeletal: Positive for myalgias.   Skin: Positive for itching.   Neurological: Negative.    Endo/Heme/Allergies: Negative.    Psychiatric/Behavioral: Negative.         Physical Exam  Temp:  [36.4 °C (97.6 °F)-37.3 °C (99.1 °F)] 36.8 °C (98.3 °F)  Pulse:  [70-89] 82  Resp:  [12-18] 16  BP: (125-171)/(62-81) 151/76  SpO2:  [91 %-95 %] 91 %    Physical Exam  Vitals signs and nursing note reviewed.   Constitutional:       Appearance: Normal appearance.   HENT:      Head: Normocephalic.      Nose: Nose normal.      Mouth/Throat:      Mouth: Mucous membranes are moist.      Pharynx: Oropharynx  is clear.   Eyes:      Pupils: Pupils are equal, round, and reactive to light.   Neck:      Musculoskeletal: Normal range of motion and neck supple.   Cardiovascular:      Rate and Rhythm: Normal rate and regular rhythm.      Pulses: Normal pulses.      Heart sounds: Normal heart sounds.   Pulmonary:      Effort: Pulmonary effort is normal.      Breath sounds: Normal breath sounds.   Abdominal:      General: Abdomen is flat. Bowel sounds are normal.   Musculoskeletal:         General: Tenderness (LLE, also cool, thready pulses) and deformity (R AKA) present.      Left lower leg: Edema present.        Legs:    Skin:     General: Skin is warm.      Capillary Refill: Capillary refill takes less than 2 seconds.      Findings: Erythema present.   Neurological:      General: No focal deficit present.      Mental Status: He is alert. Mental status is at baseline.   Psychiatric:         Mood and Affect: Mood normal.         Fluids    Intake/Output Summary (Last 24 hours) at 12/31/2019 1414  Last data filed at 12/31/2019 1150  Gross per 24 hour   Intake 2543 ml   Output 2050 ml   Net 493 ml       Laboratory  Recent Labs     12/29/19  0556 12/30/19  0512 12/31/19  0524   WBC 6.1 5.8 5.4   RBC 2.99* 2.89* 3.16*   HEMOGLOBIN 9.2* 8.8* 9.6*   HEMATOCRIT 27.4* 27.4* 30.1*   MCV 91.6 94.8 95.3   MCH 30.8 30.4 30.4   MCHC 33.6* 32.1* 31.9*   RDW 46.8 47.2 48.6   PLATELETCT 203 182 207   MPV 10.4 10.1 10.0     Recent Labs     12/29/19  0556 12/30/19  0512 12/31/19  0524   SODIUM 136 133* 137   POTASSIUM 4.3 4.1 4.2   CHLORIDE 107 105 107   CO2 24 23 23   GLUCOSE 92 85 83   BUN 29* 19 15   CREATININE 1.62* 1.42* 1.49*   CALCIUM 8.6 8.4* 8.6                   Imaging  DX-PORTABLE FLUORO > 1 HOUR   Final Result      Portable fluoroscopy utilized for 39 seconds.         INTERPRETING LOCATION: [address] [place] [address], [address]      US-RENAL   Final Result      No hydronephrosis. No renal calculus.      US-EXTREMITY ARTERY LOWER UNILAT LEFT    Final Result      US-RIVAS SINGLE LEVEL BILAT   Final Result           Assessment/Plan  ADRIA (acute kidney injury) (HCC)  Assessment & Plan  -With unknown baseline, the patient stated that he had CKD in the past but it resolved  -Attempt to correct with bicarb drip, follow closely, ultrasound of the renal structures  -Cr. 2.58 ; will recheck and treat as needed  -12/28: Cr 2.06  -12/30 Cr 1.42    Critical lower limb ischemia  Assessment & Plan  -Left lower extremity, plan for attempted revascularization with Dr. Valles 12/27  -12/28:  POD#1 from left femoral thromboendarterectomy and profundoplasty.  -12/28: no pulses palpated or auscultated with doppler over dorsalis pedus and posterior tibial points - Surgeon aware  -12/28: taken back to surgery; LEFT femoral artery angioplasty   -12/29: dorsal pedis and posterior tibial pulses auscultated with doppler  12/31. CT Angiography of LLE planned tomorrow.    Arterial occlusion due to arteriosclerosis  Assessment & Plan  -Of the left lower extremity, no dopplerable pulses, abnormal vascular study with occlusion in the femoral area  -Patient referred to vascular surgery, plan for intervention 12/27/19 with Dr. Valles  -Heparin drip - off prior to surgery on 12/27  -12/28: POD#1 from left femoral thromboendarterectomy and profundoplasty.  -Pain control  -Per Vascular Surgery, pt to go back to OR for LEFT LE angiogram and possible intervention  12/31. Anticipating LLE CT angiography for tomorrow.  Reassured patient.    History of tobacco abuse  Assessment & Plan  -Extensive, the patient apparently quit 6 years ago    S/P AKA (above knee amputation), right (HCC)  Assessment & Plan  -Secondary to vascular complication right lower extremity  -AKA done in [facility] in 2014    Essential hypertension  Assessment & Plan  -History of, control multimodality and adjust as indicated    Dyslipidemia  Assessment & Plan  -On statin, continue    PAD (peripheral artery disease)  (HCC)  Assessment & Plan  -History of with right lower extremity amputation 2014   -history of extensive tobacco abuse       VTE prophylaxis: Heparin SQ; defer to Vascular Surgery re: anticoagulation.    Reviewed vitals, labs, imaging, staff notes.  Discussed assessment and plan with Antonio Walker   Discussed with LUIS FERNANDO

## 2019-12-31 NOTE — CARE PLAN
Problem: Safety  Goal: Will remain free from injury  Outcome: PROGRESSING AS EXPECTED  Goal: Will remain free from falls  Outcome: PROGRESSING AS EXPECTED  Fall precautions in place. Educated pt to call for assistance. Bed alarm on.      Problem: Pain Management  Goal: Pain level will decrease to patient's comfort goal  Outcome: PROGRESSING AS EXPECTED   Pain managed with PRN oxycodone and PRN IV Dilaudid

## 2020-01-01 ENCOUNTER — HOME CARE VISIT (OUTPATIENT)
Dept: HOME HEALTH SERVICES | Facility: HOME HEALTHCARE | Age: 70
End: 2020-01-01
Payer: MEDICARE

## 2020-01-01 ENCOUNTER — APPOINTMENT (OUTPATIENT)
Dept: RADIOLOGY | Facility: MEDICAL CENTER | Age: 70
DRG: 253 | End: 2020-01-01
Attending: SURGERY
Payer: MEDICARE

## 2020-01-01 ENCOUNTER — PATIENT OUTREACH (OUTPATIENT)
Dept: HEALTH INFORMATION MANAGEMENT | Facility: OTHER | Age: 70
End: 2020-01-01

## 2020-01-01 ENCOUNTER — APPOINTMENT (OUTPATIENT)
Dept: RADIOLOGY | Facility: MEDICAL CENTER | Age: 70
DRG: 252 | End: 2020-01-01
Attending: SURGERY
Payer: MEDICARE

## 2020-01-01 ENCOUNTER — APPOINTMENT (OUTPATIENT)
Dept: RADIOLOGY | Facility: MEDICAL CENTER | Age: 70
DRG: 252 | End: 2020-01-01
Attending: HOSPITALIST
Payer: MEDICARE

## 2020-01-01 ENCOUNTER — ANESTHESIA (OUTPATIENT)
Dept: SURGERY | Facility: MEDICAL CENTER | Age: 70
DRG: 252 | End: 2020-01-01
Payer: MEDICARE

## 2020-01-01 ENCOUNTER — ANESTHESIA EVENT (OUTPATIENT)
Dept: SURGERY | Facility: MEDICAL CENTER | Age: 70
DRG: 252 | End: 2020-01-01
Payer: MEDICARE

## 2020-01-01 ENCOUNTER — DOCUMENTATION (OUTPATIENT)
Dept: VASCULAR LAB | Facility: MEDICAL CENTER | Age: 70
End: 2020-01-01

## 2020-01-01 ENCOUNTER — APPOINTMENT (OUTPATIENT)
Dept: RADIOLOGY | Facility: MEDICAL CENTER | Age: 70
DRG: 252 | End: 2020-01-01
Attending: INTERNAL MEDICINE
Payer: MEDICARE

## 2020-01-01 ENCOUNTER — HOME HEALTH ADMISSION (OUTPATIENT)
Dept: HOME HEALTH SERVICES | Facility: HOME HEALTHCARE | Age: 70
End: 2020-01-01
Payer: MEDICARE

## 2020-01-01 ENCOUNTER — HOSPITAL ENCOUNTER (INPATIENT)
Facility: MEDICAL CENTER | Age: 70
LOS: 13 days | DRG: 252 | End: 2020-07-17
Attending: EMERGENCY MEDICINE | Admitting: INTERNAL MEDICINE
Payer: MEDICARE

## 2020-01-01 ENCOUNTER — ANESTHESIA (OUTPATIENT)
Dept: SURGERY | Facility: MEDICAL CENTER | Age: 70
DRG: 253 | End: 2020-01-01
Payer: MEDICARE

## 2020-01-01 ENCOUNTER — APPOINTMENT (OUTPATIENT)
Dept: CARDIOLOGY | Facility: MEDICAL CENTER | Age: 70
DRG: 252 | End: 2020-01-01
Attending: HOSPITALIST
Payer: MEDICARE

## 2020-01-01 ENCOUNTER — ANESTHESIA EVENT (OUTPATIENT)
Dept: SURGERY | Facility: MEDICAL CENTER | Age: 70
DRG: 253 | End: 2020-01-01
Payer: MEDICARE

## 2020-01-01 ENCOUNTER — ANTICOAGULATION MONITORING (OUTPATIENT)
Dept: MEDICAL GROUP | Facility: PHYSICIAN GROUP | Age: 70
End: 2020-01-01

## 2020-01-01 ENCOUNTER — APPOINTMENT (OUTPATIENT)
Dept: RADIOLOGY | Facility: MEDICAL CENTER | Age: 70
DRG: 252 | End: 2020-01-01
Attending: EMERGENCY MEDICINE
Payer: MEDICARE

## 2020-01-01 VITALS
TEMPERATURE: 98.2 F | DIASTOLIC BLOOD PRESSURE: 50 MMHG | SYSTOLIC BLOOD PRESSURE: 110 MMHG | RESPIRATION RATE: 16 BRPM | OXYGEN SATURATION: 98 % | HEART RATE: 77 BPM

## 2020-01-01 VITALS
DIASTOLIC BLOOD PRESSURE: 68 MMHG | SYSTOLIC BLOOD PRESSURE: 124 MMHG | TEMPERATURE: 98.7 F | RESPIRATION RATE: 16 BRPM | OXYGEN SATURATION: 98 % | HEART RATE: 63 BPM

## 2020-01-01 VITALS
TEMPERATURE: 98.2 F | RESPIRATION RATE: 12 BRPM | OXYGEN SATURATION: 96 % | HEIGHT: 68 IN | BODY MASS INDEX: 21.22 KG/M2 | WEIGHT: 140 LBS | DIASTOLIC BLOOD PRESSURE: 60 MMHG | HEART RATE: 84 BPM | SYSTOLIC BLOOD PRESSURE: 110 MMHG

## 2020-01-01 VITALS
TEMPERATURE: 98.7 F | DIASTOLIC BLOOD PRESSURE: 72 MMHG | OXYGEN SATURATION: 92 % | SYSTOLIC BLOOD PRESSURE: 132 MMHG | HEART RATE: 68 BPM | RESPIRATION RATE: 16 BRPM

## 2020-01-01 VITALS
RESPIRATION RATE: 15 BRPM | HEART RATE: 72 BPM | DIASTOLIC BLOOD PRESSURE: 50 MMHG | SYSTOLIC BLOOD PRESSURE: 147 MMHG | HEIGHT: 67 IN | WEIGHT: 150.79 LBS | TEMPERATURE: 98.4 F | BODY MASS INDEX: 23.67 KG/M2 | OXYGEN SATURATION: 93 %

## 2020-01-01 VITALS
RESPIRATION RATE: 16 BRPM | HEART RATE: 105 BPM | TEMPERATURE: 98.1 F | SYSTOLIC BLOOD PRESSURE: 141 MMHG | BODY MASS INDEX: 21.25 KG/M2 | WEIGHT: 140.21 LBS | DIASTOLIC BLOOD PRESSURE: 75 MMHG | HEIGHT: 68 IN | OXYGEN SATURATION: 91 %

## 2020-01-01 VITALS
DIASTOLIC BLOOD PRESSURE: 68 MMHG | HEART RATE: 58 BPM | SYSTOLIC BLOOD PRESSURE: 126 MMHG | RESPIRATION RATE: 16 BRPM | TEMPERATURE: 97.8 F | OXYGEN SATURATION: 97 %

## 2020-01-01 VITALS
SYSTOLIC BLOOD PRESSURE: 124 MMHG | BODY MASS INDEX: 21.29 KG/M2 | DIASTOLIC BLOOD PRESSURE: 52 MMHG | TEMPERATURE: 97.2 F | RESPIRATION RATE: 16 BRPM | HEART RATE: 63 BPM | OXYGEN SATURATION: 97 % | HEIGHT: 68 IN

## 2020-01-01 VITALS
DIASTOLIC BLOOD PRESSURE: 60 MMHG | TEMPERATURE: 98 F | OXYGEN SATURATION: 98 % | SYSTOLIC BLOOD PRESSURE: 122 MMHG | RESPIRATION RATE: 16 BRPM | HEART RATE: 72 BPM

## 2020-01-01 VITALS
TEMPERATURE: 98.4 F | DIASTOLIC BLOOD PRESSURE: 64 MMHG | HEART RATE: 74 BPM | OXYGEN SATURATION: 99 % | SYSTOLIC BLOOD PRESSURE: 126 MMHG | RESPIRATION RATE: 16 BRPM

## 2020-01-01 VITALS
OXYGEN SATURATION: 99 % | SYSTOLIC BLOOD PRESSURE: 118 MMHG | HEART RATE: 70 BPM | DIASTOLIC BLOOD PRESSURE: 62 MMHG | RESPIRATION RATE: 16 BRPM | TEMPERATURE: 97.8 F

## 2020-01-01 VITALS
HEART RATE: 73 BPM | RESPIRATION RATE: 18 BRPM | SYSTOLIC BLOOD PRESSURE: 124 MMHG | OXYGEN SATURATION: 95 % | TEMPERATURE: 98.3 F | DIASTOLIC BLOOD PRESSURE: 66 MMHG

## 2020-01-01 VITALS
RESPIRATION RATE: 16 BRPM | HEART RATE: 66 BPM | SYSTOLIC BLOOD PRESSURE: 148 MMHG | OXYGEN SATURATION: 98 % | DIASTOLIC BLOOD PRESSURE: 74 MMHG | TEMPERATURE: 98.3 F

## 2020-01-01 VITALS
HEART RATE: 69 BPM | SYSTOLIC BLOOD PRESSURE: 124 MMHG | OXYGEN SATURATION: 98 % | TEMPERATURE: 98.8 F | RESPIRATION RATE: 16 BRPM | DIASTOLIC BLOOD PRESSURE: 62 MMHG

## 2020-01-01 VITALS
OXYGEN SATURATION: 94 % | DIASTOLIC BLOOD PRESSURE: 74 MMHG | SYSTOLIC BLOOD PRESSURE: 118 MMHG | HEART RATE: 88 BPM | RESPIRATION RATE: 16 BRPM | TEMPERATURE: 98.2 F

## 2020-01-01 VITALS
OXYGEN SATURATION: 98 % | SYSTOLIC BLOOD PRESSURE: 138 MMHG | HEART RATE: 88 BPM | DIASTOLIC BLOOD PRESSURE: 66 MMHG | RESPIRATION RATE: 18 BRPM | TEMPERATURE: 98.8 F

## 2020-01-01 VITALS
HEART RATE: 77 BPM | DIASTOLIC BLOOD PRESSURE: 50 MMHG | OXYGEN SATURATION: 98 % | RESPIRATION RATE: 18 BRPM | SYSTOLIC BLOOD PRESSURE: 110 MMHG | TEMPERATURE: 98.2 F

## 2020-01-01 VITALS
RESPIRATION RATE: 16 BRPM | SYSTOLIC BLOOD PRESSURE: 102 MMHG | TEMPERATURE: 98.2 F | OXYGEN SATURATION: 99 % | DIASTOLIC BLOOD PRESSURE: 58 MMHG | HEART RATE: 60 BPM

## 2020-01-01 DIAGNOSIS — Z89.611 S/P AKA (ABOVE KNEE AMPUTATION), RIGHT (HCC): ICD-10-CM

## 2020-01-01 DIAGNOSIS — R53.1 WEAKNESS: ICD-10-CM

## 2020-01-01 DIAGNOSIS — I70.229 CRITICAL LOWER LIMB ISCHEMIA (HCC): ICD-10-CM

## 2020-01-01 LAB
ABO + RH BLD: NORMAL
ABO GROUP BLD: NORMAL
ALBUMIN SERPL BCP-MCNC: 2.3 G/DL (ref 3.2–4.9)
ALBUMIN SERPL BCP-MCNC: 2.4 G/DL (ref 3.2–4.9)
ALBUMIN SERPL BCP-MCNC: 2.4 G/DL (ref 3.2–4.9)
ALBUMIN SERPL BCP-MCNC: 3 G/DL (ref 3.2–4.9)
ALBUMIN SERPL BCP-MCNC: 3.8 G/DL (ref 3.2–4.9)
ALBUMIN/GLOB SERPL: 0.9 G/DL
ALBUMIN/GLOB SERPL: 0.9 G/DL
ALBUMIN/GLOB SERPL: 1 G/DL
ALBUMIN/GLOB SERPL: 1 G/DL
ALBUMIN/GLOB SERPL: 1.4 G/DL
ALP SERPL-CCNC: 123 U/L (ref 30–99)
ALP SERPL-CCNC: 66 U/L (ref 30–99)
ALP SERPL-CCNC: 71 U/L (ref 30–99)
ALP SERPL-CCNC: 72 U/L (ref 30–99)
ALP SERPL-CCNC: 78 U/L (ref 30–99)
ALT SERPL-CCNC: 15 U/L (ref 2–50)
ALT SERPL-CCNC: 17 U/L (ref 2–50)
ALT SERPL-CCNC: 23 U/L (ref 2–50)
ALT SERPL-CCNC: 23 U/L (ref 2–50)
ALT SERPL-CCNC: 5 U/L (ref 2–50)
ANION GAP SERPL CALC-SCNC: 10 MMOL/L (ref 7–16)
ANION GAP SERPL CALC-SCNC: 11 MMOL/L (ref 7–16)
ANION GAP SERPL CALC-SCNC: 12 MMOL/L (ref 7–16)
ANION GAP SERPL CALC-SCNC: 14 MMOL/L (ref 7–16)
ANION GAP SERPL CALC-SCNC: 15 MMOL/L (ref 7–16)
ANION GAP SERPL CALC-SCNC: 6 MMOL/L (ref 7–16)
ANION GAP SERPL CALC-SCNC: 7 MMOL/L (ref 0–11.9)
ANION GAP SERPL CALC-SCNC: 8 MMOL/L (ref 7–16)
ANION GAP SERPL CALC-SCNC: 9 MMOL/L (ref 0–11.9)
ANION GAP SERPL CALC-SCNC: 9 MMOL/L (ref 7–16)
ANION GAP SERPL CALC-SCNC: 9 MMOL/L (ref 7–16)
APTT PPP: 107 SEC (ref 24.7–36)
APTT PPP: 180.6 SEC (ref 24.7–36)
APTT PPP: 194.1 SEC (ref 24.7–36)
APTT PPP: 34.1 SEC (ref 24.7–36)
APTT PPP: 35.5 SEC (ref 24.7–36)
APTT PPP: 39.2 SEC (ref 24.7–36)
APTT PPP: 39.4 SEC (ref 24.7–36)
APTT PPP: 54.5 SEC (ref 24.7–36)
APTT PPP: 55.7 SEC (ref 24.7–36)
APTT PPP: 56.2 SEC (ref 24.7–36)
APTT PPP: 59.6 SEC (ref 24.7–36)
APTT PPP: 64.9 SEC (ref 24.7–36)
APTT PPP: 78.5 SEC (ref 24.7–36)
APTT PPP: >240 SEC (ref 24.7–36)
AST SERPL-CCNC: 13 U/L (ref 12–45)
AST SERPL-CCNC: 16 U/L (ref 12–45)
AST SERPL-CCNC: 17 U/L (ref 12–45)
AST SERPL-CCNC: 25 U/L (ref 12–45)
AST SERPL-CCNC: 28 U/L (ref 12–45)
BARCODED ABORH UBTYP: 1700
BARCODED ABORH UBTYP: 1700
BARCODED ABORH UBTYP: 7300
BARCODED PRD CODE UBPRD: NORMAL
BARCODED UNIT NUM UBUNT: NORMAL
BASOPHILS # BLD AUTO: 0.1 % (ref 0–1.8)
BASOPHILS # BLD AUTO: 0.3 % (ref 0–1.8)
BASOPHILS # BLD AUTO: 0.3 % (ref 0–1.8)
BASOPHILS # BLD AUTO: 0.4 % (ref 0–1.8)
BASOPHILS # BLD AUTO: 0.4 % (ref 0–1.8)
BASOPHILS # BLD AUTO: 0.5 % (ref 0–1.8)
BASOPHILS # BLD AUTO: 0.6 % (ref 0–1.8)
BASOPHILS # BLD AUTO: 0.7 % (ref 0–1.8)
BASOPHILS # BLD: 0.01 K/UL (ref 0–0.12)
BASOPHILS # BLD: 0.02 K/UL (ref 0–0.12)
BASOPHILS # BLD: 0.03 K/UL (ref 0–0.12)
BASOPHILS # BLD: 0.04 K/UL (ref 0–0.12)
BASOPHILS # BLD: 0.05 K/UL (ref 0–0.12)
BILIRUB SERPL-MCNC: 0.2 MG/DL (ref 0.1–1.5)
BILIRUB SERPL-MCNC: 0.3 MG/DL (ref 0.1–1.5)
BILIRUB SERPL-MCNC: 0.4 MG/DL (ref 0.1–1.5)
BLD GP AB SCN SERPL QL: NORMAL
BUN SERPL-MCNC: 14 MG/DL (ref 8–22)
BUN SERPL-MCNC: 16 MG/DL (ref 8–22)
BUN SERPL-MCNC: 18 MG/DL (ref 8–22)
BUN SERPL-MCNC: 27 MG/DL (ref 8–22)
BUN SERPL-MCNC: 30 MG/DL (ref 8–22)
BUN SERPL-MCNC: 30 MG/DL (ref 8–22)
BUN SERPL-MCNC: 33 MG/DL (ref 8–22)
BUN SERPL-MCNC: 36 MG/DL (ref 8–22)
BUN SERPL-MCNC: 36 MG/DL (ref 8–22)
BUN SERPL-MCNC: 38 MG/DL (ref 8–22)
BUN SERPL-MCNC: 39 MG/DL (ref 8–22)
BUN SERPL-MCNC: 43 MG/DL (ref 8–22)
BUN SERPL-MCNC: 45 MG/DL (ref 8–22)
BUN SERPL-MCNC: 49 MG/DL (ref 8–22)
BUN SERPL-MCNC: 52 MG/DL (ref 8–22)
BUN SERPL-MCNC: 57 MG/DL (ref 8–22)
CALCIUM SERPL-MCNC: 7.7 MG/DL (ref 8.5–10.5)
CALCIUM SERPL-MCNC: 7.7 MG/DL (ref 8.5–10.5)
CALCIUM SERPL-MCNC: 7.8 MG/DL (ref 8.5–10.5)
CALCIUM SERPL-MCNC: 7.9 MG/DL (ref 8.5–10.5)
CALCIUM SERPL-MCNC: 8 MG/DL (ref 8.5–10.5)
CALCIUM SERPL-MCNC: 8.1 MG/DL (ref 8.5–10.5)
CALCIUM SERPL-MCNC: 8.2 MG/DL (ref 8.5–10.5)
CALCIUM SERPL-MCNC: 8.2 MG/DL (ref 8.5–10.5)
CALCIUM SERPL-MCNC: 8.3 MG/DL (ref 8.5–10.5)
CALCIUM SERPL-MCNC: 8.3 MG/DL (ref 8.5–10.5)
CALCIUM SERPL-MCNC: 8.5 MG/DL (ref 8.5–10.5)
CALCIUM SERPL-MCNC: 8.5 MG/DL (ref 8.5–10.5)
CHLORIDE SERPL-SCNC: 100 MMOL/L (ref 96–112)
CHLORIDE SERPL-SCNC: 101 MMOL/L (ref 96–112)
CHLORIDE SERPL-SCNC: 105 MMOL/L (ref 96–112)
CHLORIDE SERPL-SCNC: 106 MMOL/L (ref 96–112)
CHLORIDE SERPL-SCNC: 107 MMOL/L (ref 96–112)
CHLORIDE SERPL-SCNC: 108 MMOL/L (ref 96–112)
CHLORIDE SERPL-SCNC: 110 MMOL/L (ref 96–112)
CHLORIDE SERPL-SCNC: 110 MMOL/L (ref 96–112)
CHLORIDE SERPL-SCNC: 111 MMOL/L (ref 96–112)
CHLORIDE SERPL-SCNC: 112 MMOL/L (ref 96–112)
CHLORIDE SERPL-SCNC: 113 MMOL/L (ref 96–112)
CHLORIDE SERPL-SCNC: 113 MMOL/L (ref 96–112)
CHLORIDE SERPL-SCNC: 114 MMOL/L (ref 96–112)
CHLORIDE SERPL-SCNC: 97 MMOL/L (ref 96–112)
CO2 SERPL-SCNC: 13 MMOL/L (ref 20–33)
CO2 SERPL-SCNC: 13 MMOL/L (ref 20–33)
CO2 SERPL-SCNC: 14 MMOL/L (ref 20–33)
CO2 SERPL-SCNC: 15 MMOL/L (ref 20–33)
CO2 SERPL-SCNC: 16 MMOL/L (ref 20–33)
CO2 SERPL-SCNC: 16 MMOL/L (ref 20–33)
CO2 SERPL-SCNC: 17 MMOL/L (ref 20–33)
CO2 SERPL-SCNC: 17 MMOL/L (ref 20–33)
CO2 SERPL-SCNC: 18 MMOL/L (ref 20–33)
CO2 SERPL-SCNC: 18 MMOL/L (ref 20–33)
CO2 SERPL-SCNC: 19 MMOL/L (ref 20–33)
CO2 SERPL-SCNC: 19 MMOL/L (ref 20–33)
CO2 SERPL-SCNC: 21 MMOL/L (ref 20–33)
CO2 SERPL-SCNC: 22 MMOL/L (ref 20–33)
CO2 SERPL-SCNC: 23 MMOL/L (ref 20–33)
CO2 SERPL-SCNC: 24 MMOL/L (ref 20–33)
COMPONENT R 8504R: NORMAL
COVID ORDER STATUS COVID19: NORMAL
CREAT SERPL-MCNC: 1.52 MG/DL (ref 0.5–1.4)
CREAT SERPL-MCNC: 1.53 MG/DL (ref 0.5–1.4)
CREAT SERPL-MCNC: 1.55 MG/DL (ref 0.5–1.4)
CREAT SERPL-MCNC: 1.55 MG/DL (ref 0.5–1.4)
CREAT SERPL-MCNC: 1.57 MG/DL (ref 0.5–1.4)
CREAT SERPL-MCNC: 1.59 MG/DL (ref 0.5–1.4)
CREAT SERPL-MCNC: 1.62 MG/DL (ref 0.5–1.4)
CREAT SERPL-MCNC: 1.62 MG/DL (ref 0.5–1.4)
CREAT SERPL-MCNC: 1.63 MG/DL (ref 0.5–1.4)
CREAT SERPL-MCNC: 1.63 MG/DL (ref 0.5–1.4)
CREAT SERPL-MCNC: 1.66 MG/DL (ref 0.5–1.4)
CREAT SERPL-MCNC: 1.68 MG/DL (ref 0.5–1.4)
CREAT SERPL-MCNC: 1.7 MG/DL (ref 0.5–1.4)
CREAT SERPL-MCNC: 1.74 MG/DL (ref 0.5–1.4)
CREAT SERPL-MCNC: 2.1 MG/DL (ref 0.5–1.4)
CREAT SERPL-MCNC: 2.43 MG/DL (ref 0.5–1.4)
EKG IMPRESSION: NORMAL
EOSINOPHIL # BLD AUTO: 0 K/UL (ref 0–0.51)
EOSINOPHIL # BLD AUTO: 0.04 K/UL (ref 0–0.51)
EOSINOPHIL # BLD AUTO: 0.16 K/UL (ref 0–0.51)
EOSINOPHIL # BLD AUTO: 0.21 K/UL (ref 0–0.51)
EOSINOPHIL # BLD AUTO: 0.21 K/UL (ref 0–0.51)
EOSINOPHIL # BLD AUTO: 0.26 K/UL (ref 0–0.51)
EOSINOPHIL # BLD AUTO: 0.27 K/UL (ref 0–0.51)
EOSINOPHIL # BLD AUTO: 0.29 K/UL (ref 0–0.51)
EOSINOPHIL # BLD AUTO: 0.31 K/UL (ref 0–0.51)
EOSINOPHIL NFR BLD: 0 % (ref 0–6.9)
EOSINOPHIL NFR BLD: 0.4 % (ref 0–6.9)
EOSINOPHIL NFR BLD: 2.4 % (ref 0–6.9)
EOSINOPHIL NFR BLD: 2.7 % (ref 0–6.9)
EOSINOPHIL NFR BLD: 2.8 % (ref 0–6.9)
EOSINOPHIL NFR BLD: 3.5 % (ref 0–6.9)
EOSINOPHIL NFR BLD: 3.6 % (ref 0–6.9)
EOSINOPHIL NFR BLD: 3.8 % (ref 0–6.9)
EOSINOPHIL NFR BLD: 4.4 % (ref 0–6.9)
EOSINOPHIL NFR BLD: 5.3 % (ref 0–6.9)
EOSINOPHIL NFR BLD: 5.5 % (ref 0–6.9)
ERYTHROCYTE [DISTWIDTH] IN BLOOD BY AUTOMATED COUNT: 48 FL (ref 35.9–50)
ERYTHROCYTE [DISTWIDTH] IN BLOOD BY AUTOMATED COUNT: 48.7 FL (ref 35.9–50)
ERYTHROCYTE [DISTWIDTH] IN BLOOD BY AUTOMATED COUNT: 51.4 FL (ref 35.9–50)
ERYTHROCYTE [DISTWIDTH] IN BLOOD BY AUTOMATED COUNT: 51.8 FL (ref 35.9–50)
ERYTHROCYTE [DISTWIDTH] IN BLOOD BY AUTOMATED COUNT: 52.6 FL (ref 35.9–50)
ERYTHROCYTE [DISTWIDTH] IN BLOOD BY AUTOMATED COUNT: 53.1 FL (ref 35.9–50)
ERYTHROCYTE [DISTWIDTH] IN BLOOD BY AUTOMATED COUNT: 54.7 FL (ref 35.9–50)
ERYTHROCYTE [DISTWIDTH] IN BLOOD BY AUTOMATED COUNT: 55.9 FL (ref 35.9–50)
ERYTHROCYTE [DISTWIDTH] IN BLOOD BY AUTOMATED COUNT: 56.5 FL (ref 35.9–50)
ERYTHROCYTE [DISTWIDTH] IN BLOOD BY AUTOMATED COUNT: 56.7 FL (ref 35.9–50)
ERYTHROCYTE [DISTWIDTH] IN BLOOD BY AUTOMATED COUNT: 57.5 FL (ref 35.9–50)
ERYTHROCYTE [DISTWIDTH] IN BLOOD BY AUTOMATED COUNT: 58 FL (ref 35.9–50)
ERYTHROCYTE [DISTWIDTH] IN BLOOD BY AUTOMATED COUNT: 58.5 FL (ref 35.9–50)
ERYTHROCYTE [DISTWIDTH] IN BLOOD BY AUTOMATED COUNT: 59.7 FL (ref 35.9–50)
ERYTHROCYTE [DISTWIDTH] IN BLOOD BY AUTOMATED COUNT: 59.7 FL (ref 35.9–50)
ERYTHROCYTE [DISTWIDTH] IN BLOOD BY AUTOMATED COUNT: 59.9 FL (ref 35.9–50)
ERYTHROCYTE [DISTWIDTH] IN BLOOD BY AUTOMATED COUNT: 60 FL (ref 35.9–50)
ERYTHROCYTE [DISTWIDTH] IN BLOOD BY AUTOMATED COUNT: 61.5 FL (ref 35.9–50)
FERRITIN SERPL-MCNC: 57.3 NG/ML (ref 22–322)
FIBRINOGEN PPP-MCNC: 354 MG/DL (ref 215–460)
FIBRINOGEN PPP-MCNC: 363 MG/DL (ref 215–460)
FIBRINOGEN PPP-MCNC: 383 MG/DL (ref 215–460)
FIBRINOGEN PPP-MCNC: 398 MG/DL (ref 215–460)
FIBRINOGEN PPP-MCNC: 404 MG/DL (ref 215–460)
FOLATE SERPL-MCNC: 5.1 NG/ML
GLOBULIN SER CALC-MCNC: 2.3 G/DL (ref 1.9–3.5)
GLOBULIN SER CALC-MCNC: 2.3 G/DL (ref 1.9–3.5)
GLOBULIN SER CALC-MCNC: 2.5 G/DL (ref 1.9–3.5)
GLOBULIN SER CALC-MCNC: 2.8 G/DL (ref 1.9–3.5)
GLOBULIN SER CALC-MCNC: 3.2 G/DL (ref 1.9–3.5)
GLUCOSE BLD-MCNC: 101 MG/DL (ref 65–99)
GLUCOSE BLD-MCNC: 101 MG/DL (ref 65–99)
GLUCOSE BLD-MCNC: 102 MG/DL (ref 65–99)
GLUCOSE BLD-MCNC: 110 MG/DL (ref 65–99)
GLUCOSE BLD-MCNC: 113 MG/DL (ref 65–99)
GLUCOSE BLD-MCNC: 114 MG/DL (ref 65–99)
GLUCOSE BLD-MCNC: 117 MG/DL (ref 65–99)
GLUCOSE BLD-MCNC: 118 MG/DL (ref 65–99)
GLUCOSE BLD-MCNC: 121 MG/DL (ref 65–99)
GLUCOSE BLD-MCNC: 122 MG/DL (ref 65–99)
GLUCOSE BLD-MCNC: 123 MG/DL (ref 65–99)
GLUCOSE BLD-MCNC: 126 MG/DL (ref 65–99)
GLUCOSE BLD-MCNC: 129 MG/DL (ref 65–99)
GLUCOSE BLD-MCNC: 133 MG/DL (ref 65–99)
GLUCOSE BLD-MCNC: 135 MG/DL (ref 65–99)
GLUCOSE BLD-MCNC: 136 MG/DL (ref 65–99)
GLUCOSE BLD-MCNC: 136 MG/DL (ref 65–99)
GLUCOSE BLD-MCNC: 137 MG/DL (ref 65–99)
GLUCOSE BLD-MCNC: 138 MG/DL (ref 65–99)
GLUCOSE BLD-MCNC: 141 MG/DL (ref 65–99)
GLUCOSE BLD-MCNC: 144 MG/DL (ref 65–99)
GLUCOSE BLD-MCNC: 144 MG/DL (ref 65–99)
GLUCOSE BLD-MCNC: 145 MG/DL (ref 65–99)
GLUCOSE BLD-MCNC: 146 MG/DL (ref 65–99)
GLUCOSE BLD-MCNC: 148 MG/DL (ref 65–99)
GLUCOSE BLD-MCNC: 149 MG/DL (ref 65–99)
GLUCOSE BLD-MCNC: 149 MG/DL (ref 65–99)
GLUCOSE BLD-MCNC: 155 MG/DL (ref 65–99)
GLUCOSE BLD-MCNC: 158 MG/DL (ref 65–99)
GLUCOSE BLD-MCNC: 162 MG/DL (ref 65–99)
GLUCOSE BLD-MCNC: 167 MG/DL (ref 65–99)
GLUCOSE BLD-MCNC: 173 MG/DL (ref 65–99)
GLUCOSE BLD-MCNC: 192 MG/DL (ref 65–99)
GLUCOSE BLD-MCNC: 61 MG/DL (ref 65–99)
GLUCOSE BLD-MCNC: 72 MG/DL (ref 65–99)
GLUCOSE BLD-MCNC: 77 MG/DL (ref 65–99)
GLUCOSE BLD-MCNC: 85 MG/DL (ref 65–99)
GLUCOSE BLD-MCNC: 86 MG/DL (ref 65–99)
GLUCOSE BLD-MCNC: 88 MG/DL (ref 65–99)
GLUCOSE BLD-MCNC: 89 MG/DL (ref 65–99)
GLUCOSE BLD-MCNC: 90 MG/DL (ref 65–99)
GLUCOSE BLD-MCNC: 94 MG/DL (ref 65–99)
GLUCOSE BLD-MCNC: 95 MG/DL (ref 65–99)
GLUCOSE BLD-MCNC: 96 MG/DL (ref 65–99)
GLUCOSE BLD-MCNC: 96 MG/DL (ref 65–99)
GLUCOSE SERPL-MCNC: 102 MG/DL (ref 65–99)
GLUCOSE SERPL-MCNC: 104 MG/DL (ref 65–99)
GLUCOSE SERPL-MCNC: 106 MG/DL (ref 65–99)
GLUCOSE SERPL-MCNC: 109 MG/DL (ref 65–99)
GLUCOSE SERPL-MCNC: 118 MG/DL (ref 65–99)
GLUCOSE SERPL-MCNC: 130 MG/DL (ref 65–99)
GLUCOSE SERPL-MCNC: 140 MG/DL (ref 65–99)
GLUCOSE SERPL-MCNC: 140 MG/DL (ref 65–99)
GLUCOSE SERPL-MCNC: 149 MG/DL (ref 65–99)
GLUCOSE SERPL-MCNC: 165 MG/DL (ref 65–99)
GLUCOSE SERPL-MCNC: 181 MG/DL (ref 65–99)
GLUCOSE SERPL-MCNC: 48 MG/DL (ref 65–99)
GLUCOSE SERPL-MCNC: 503 MG/DL (ref 65–99)
GLUCOSE SERPL-MCNC: 67 MG/DL (ref 65–99)
GLUCOSE SERPL-MCNC: 72 MG/DL (ref 65–99)
GLUCOSE SERPL-MCNC: 98 MG/DL (ref 65–99)
HCT VFR BLD AUTO: 18.4 % (ref 42–52)
HCT VFR BLD AUTO: 21.2 % (ref 42–52)
HCT VFR BLD AUTO: 21.3 % (ref 42–52)
HCT VFR BLD AUTO: 22.2 % (ref 42–52)
HCT VFR BLD AUTO: 23.2 % (ref 42–52)
HCT VFR BLD AUTO: 24.9 % (ref 42–52)
HCT VFR BLD AUTO: 25.9 % (ref 42–52)
HCT VFR BLD AUTO: 26.2 % (ref 42–52)
HCT VFR BLD AUTO: 26.2 % (ref 42–52)
HCT VFR BLD AUTO: 27.2 % (ref 42–52)
HCT VFR BLD AUTO: 27.4 % (ref 42–52)
HCT VFR BLD AUTO: 27.9 % (ref 42–52)
HCT VFR BLD AUTO: 28.6 % (ref 42–52)
HCT VFR BLD AUTO: 29.1 % (ref 42–52)
HCT VFR BLD AUTO: 29.4 % (ref 42–52)
HCT VFR BLD AUTO: 30.3 % (ref 42–52)
HCT VFR BLD AUTO: 30.8 % (ref 42–52)
HCT VFR BLD AUTO: 32.3 % (ref 42–52)
HEMOCCULT SP1 STL QL: POSITIVE
HGB BLD-MCNC: 10 G/DL (ref 14–18)
HGB BLD-MCNC: 10 G/DL (ref 14–18)
HGB BLD-MCNC: 10.2 G/DL (ref 14–18)
HGB BLD-MCNC: 6 G/DL (ref 14–18)
HGB BLD-MCNC: 6.3 G/DL (ref 14–18)
HGB BLD-MCNC: 6.7 G/DL (ref 14–18)
HGB BLD-MCNC: 6.8 G/DL (ref 14–18)
HGB BLD-MCNC: 7.3 G/DL (ref 14–18)
HGB BLD-MCNC: 7.5 G/DL (ref 14–18)
HGB BLD-MCNC: 7.8 G/DL (ref 14–18)
HGB BLD-MCNC: 8.3 G/DL (ref 14–18)
HGB BLD-MCNC: 8.5 G/DL (ref 14–18)
HGB BLD-MCNC: 8.5 G/DL (ref 14–18)
HGB BLD-MCNC: 8.6 G/DL (ref 14–18)
HGB BLD-MCNC: 8.9 G/DL (ref 14–18)
HGB BLD-MCNC: 8.9 G/DL (ref 14–18)
HGB BLD-MCNC: 9 G/DL (ref 14–18)
HGB BLD-MCNC: 9 G/DL (ref 14–18)
HGB BLD-MCNC: 9.6 G/DL (ref 14–18)
HGB BLD-MCNC: 9.9 G/DL (ref 14–18)
IMM GRANULOCYTES # BLD AUTO: 0.01 K/UL (ref 0–0.11)
IMM GRANULOCYTES # BLD AUTO: 0.01 K/UL (ref 0–0.11)
IMM GRANULOCYTES # BLD AUTO: 0.02 K/UL (ref 0–0.11)
IMM GRANULOCYTES # BLD AUTO: 0.03 K/UL (ref 0–0.11)
IMM GRANULOCYTES # BLD AUTO: 0.05 K/UL (ref 0–0.11)
IMM GRANULOCYTES # BLD AUTO: 0.06 K/UL (ref 0–0.11)
IMM GRANULOCYTES # BLD AUTO: 0.07 K/UL (ref 0–0.11)
IMM GRANULOCYTES # BLD AUTO: 0.09 K/UL (ref 0–0.11)
IMM GRANULOCYTES NFR BLD AUTO: 0.2 % (ref 0–0.9)
IMM GRANULOCYTES NFR BLD AUTO: 0.2 % (ref 0–0.9)
IMM GRANULOCYTES NFR BLD AUTO: 0.3 % (ref 0–0.9)
IMM GRANULOCYTES NFR BLD AUTO: 0.4 % (ref 0–0.9)
IMM GRANULOCYTES NFR BLD AUTO: 0.4 % (ref 0–0.9)
IMM GRANULOCYTES NFR BLD AUTO: 0.6 % (ref 0–0.9)
IMM GRANULOCYTES NFR BLD AUTO: 0.8 % (ref 0–0.9)
IMM GRANULOCYTES NFR BLD AUTO: 0.8 % (ref 0–0.9)
IMM GRANULOCYTES NFR BLD AUTO: 0.9 % (ref 0–0.9)
INR PPP: 1.03 (ref 0.87–1.13)
INR PPP: 1.05 (ref 0.87–1.13)
INR PPP: 1.11 (ref 0.87–1.13)
INR PPP: 1.12 (ref 0.87–1.13)
INR PPP: 1.19 (ref 0.87–1.13)
IRON SATN MFR SERPL: 21 % (ref 15–55)
IRON SERPL-MCNC: 60 UG/DL (ref 50–180)
LACTATE BLD-SCNC: 1 MMOL/L (ref 0.5–2)
LACTATE BLD-SCNC: 1.2 MMOL/L (ref 0.5–2)
LACTATE BLD-SCNC: 1.4 MMOL/L (ref 0.5–2)
LACTATE BLD-SCNC: 1.6 MMOL/L (ref 0.5–2)
LV EJECT FRACT  99904: 25
LV EJECT FRACT MOD 2C 99903: 29.84
LV EJECT FRACT MOD 4C 99902: 23.08
LV EJECT FRACT MOD BP 99901: 30.82
LYMPHOCYTES # BLD AUTO: 0.55 K/UL (ref 1–4.8)
LYMPHOCYTES # BLD AUTO: 0.64 K/UL (ref 1–4.8)
LYMPHOCYTES # BLD AUTO: 0.89 K/UL (ref 1–4.8)
LYMPHOCYTES # BLD AUTO: 1.02 K/UL (ref 1–4.8)
LYMPHOCYTES # BLD AUTO: 1.07 K/UL (ref 1–4.8)
LYMPHOCYTES # BLD AUTO: 1.08 K/UL (ref 1–4.8)
LYMPHOCYTES # BLD AUTO: 1.15 K/UL (ref 1–4.8)
LYMPHOCYTES # BLD AUTO: 1.17 K/UL (ref 1–4.8)
LYMPHOCYTES # BLD AUTO: 1.24 K/UL (ref 1–4.8)
LYMPHOCYTES # BLD AUTO: 1.95 K/UL (ref 1–4.8)
LYMPHOCYTES # BLD AUTO: 2.27 K/UL (ref 1–4.8)
LYMPHOCYTES NFR BLD: 10.9 % (ref 22–41)
LYMPHOCYTES NFR BLD: 11.8 % (ref 22–41)
LYMPHOCYTES NFR BLD: 15.3 % (ref 22–41)
LYMPHOCYTES NFR BLD: 17 % (ref 22–41)
LYMPHOCYTES NFR BLD: 18.1 % (ref 22–41)
LYMPHOCYTES NFR BLD: 19.8 % (ref 22–41)
LYMPHOCYTES NFR BLD: 22.6 % (ref 22–41)
LYMPHOCYTES NFR BLD: 25.3 % (ref 22–41)
LYMPHOCYTES NFR BLD: 27.9 % (ref 22–41)
LYMPHOCYTES NFR BLD: 6.3 % (ref 22–41)
LYMPHOCYTES NFR BLD: 8.5 % (ref 22–41)
MAGNESIUM SERPL-MCNC: 1.8 MG/DL (ref 1.5–2.5)
MCH RBC QN AUTO: 29.9 PG (ref 27–33)
MCH RBC QN AUTO: 30 PG (ref 27–33)
MCH RBC QN AUTO: 30.3 PG (ref 27–33)
MCH RBC QN AUTO: 30.4 PG (ref 27–33)
MCH RBC QN AUTO: 30.5 PG (ref 27–33)
MCH RBC QN AUTO: 30.5 PG (ref 27–33)
MCH RBC QN AUTO: 30.7 PG (ref 27–33)
MCH RBC QN AUTO: 30.8 PG (ref 27–33)
MCH RBC QN AUTO: 30.8 PG (ref 27–33)
MCH RBC QN AUTO: 30.9 PG (ref 27–33)
MCH RBC QN AUTO: 30.9 PG (ref 27–33)
MCH RBC QN AUTO: 31 PG (ref 27–33)
MCH RBC QN AUTO: 31 PG (ref 27–33)
MCH RBC QN AUTO: 31.1 PG (ref 27–33)
MCH RBC QN AUTO: 31.2 PG (ref 27–33)
MCH RBC QN AUTO: 31.6 PG (ref 27–33)
MCHC RBC AUTO-ENTMCNC: 31.3 G/DL (ref 33.7–35.3)
MCHC RBC AUTO-ENTMCNC: 31.5 G/DL (ref 33.7–35.3)
MCHC RBC AUTO-ENTMCNC: 31.5 G/DL (ref 33.7–35.3)
MCHC RBC AUTO-ENTMCNC: 31.6 G/DL (ref 33.7–35.3)
MCHC RBC AUTO-ENTMCNC: 31.6 G/DL (ref 33.7–35.3)
MCHC RBC AUTO-ENTMCNC: 31.7 G/DL (ref 33.7–35.3)
MCHC RBC AUTO-ENTMCNC: 32.1 G/DL (ref 33.7–35.3)
MCHC RBC AUTO-ENTMCNC: 32.2 G/DL (ref 33.7–35.3)
MCHC RBC AUTO-ENTMCNC: 32.3 G/DL (ref 33.7–35.3)
MCHC RBC AUTO-ENTMCNC: 32.3 G/DL (ref 33.7–35.3)
MCHC RBC AUTO-ENTMCNC: 32.4 G/DL (ref 33.7–35.3)
MCHC RBC AUTO-ENTMCNC: 32.5 G/DL (ref 33.7–35.3)
MCHC RBC AUTO-ENTMCNC: 32.5 G/DL (ref 33.7–35.3)
MCHC RBC AUTO-ENTMCNC: 32.8 G/DL (ref 33.7–35.3)
MCHC RBC AUTO-ENTMCNC: 32.9 G/DL (ref 33.7–35.3)
MCHC RBC AUTO-ENTMCNC: 33 G/DL (ref 33.7–35.3)
MCHC RBC AUTO-ENTMCNC: 33 G/DL (ref 33.7–35.3)
MCHC RBC AUTO-ENTMCNC: 33.7 G/DL (ref 33.7–35.3)
MCV RBC AUTO: 91.9 FL (ref 81.4–97.8)
MCV RBC AUTO: 92.2 FL (ref 81.4–97.8)
MCV RBC AUTO: 92.9 FL (ref 81.4–97.8)
MCV RBC AUTO: 93.6 FL (ref 81.4–97.8)
MCV RBC AUTO: 94.2 FL (ref 81.4–97.8)
MCV RBC AUTO: 94.3 FL (ref 81.4–97.8)
MCV RBC AUTO: 94.6 FL (ref 81.4–97.8)
MCV RBC AUTO: 94.8 FL (ref 81.4–97.8)
MCV RBC AUTO: 94.9 FL (ref 81.4–97.8)
MCV RBC AUTO: 96.1 FL (ref 81.4–97.8)
MCV RBC AUTO: 96.3 FL (ref 81.4–97.8)
MCV RBC AUTO: 96.7 FL (ref 81.4–97.8)
MCV RBC AUTO: 96.8 FL (ref 81.4–97.8)
MCV RBC AUTO: 97.3 FL (ref 81.4–97.8)
MCV RBC AUTO: 98.6 FL (ref 81.4–97.8)
MCV RBC AUTO: 98.6 FL (ref 81.4–97.8)
MONOCYTES # BLD AUTO: 0.42 K/UL (ref 0–0.85)
MONOCYTES # BLD AUTO: 0.47 K/UL (ref 0–0.85)
MONOCYTES # BLD AUTO: 0.5 K/UL (ref 0–0.85)
MONOCYTES # BLD AUTO: 0.59 K/UL (ref 0–0.85)
MONOCYTES # BLD AUTO: 0.64 K/UL (ref 0–0.85)
MONOCYTES # BLD AUTO: 0.64 K/UL (ref 0–0.85)
MONOCYTES # BLD AUTO: 0.69 K/UL (ref 0–0.85)
MONOCYTES # BLD AUTO: 0.74 K/UL (ref 0–0.85)
MONOCYTES # BLD AUTO: 0.8 K/UL (ref 0–0.85)
MONOCYTES # BLD AUTO: 0.88 K/UL (ref 0–0.85)
MONOCYTES # BLD AUTO: 1.32 K/UL (ref 0–0.85)
MONOCYTES NFR BLD AUTO: 10.2 % (ref 0–13.4)
MONOCYTES NFR BLD AUTO: 10.2 % (ref 0–13.4)
MONOCYTES NFR BLD AUTO: 10.5 % (ref 0–13.4)
MONOCYTES NFR BLD AUTO: 11.4 % (ref 0–13.4)
MONOCYTES NFR BLD AUTO: 11.6 % (ref 0–13.4)
MONOCYTES NFR BLD AUTO: 6.8 % (ref 0–13.4)
MONOCYTES NFR BLD AUTO: 7.1 % (ref 0–13.4)
MONOCYTES NFR BLD AUTO: 8.3 % (ref 0–13.4)
MONOCYTES NFR BLD AUTO: 8.5 % (ref 0–13.4)
MONOCYTES NFR BLD AUTO: 9.1 % (ref 0–13.4)
MONOCYTES NFR BLD AUTO: 9.5 % (ref 0–13.4)
NEUTROPHILS # BLD AUTO: 2.88 K/UL (ref 1.82–7.42)
NEUTROPHILS # BLD AUTO: 3.27 K/UL (ref 1.82–7.42)
NEUTROPHILS # BLD AUTO: 4.11 K/UL (ref 1.82–7.42)
NEUTROPHILS # BLD AUTO: 4.61 K/UL (ref 1.82–7.42)
NEUTROPHILS # BLD AUTO: 4.74 K/UL (ref 1.82–7.42)
NEUTROPHILS # BLD AUTO: 4.75 K/UL (ref 1.82–7.42)
NEUTROPHILS # BLD AUTO: 4.79 K/UL (ref 1.82–7.42)
NEUTROPHILS # BLD AUTO: 6 K/UL (ref 1.82–7.42)
NEUTROPHILS # BLD AUTO: 6.44 K/UL (ref 1.82–7.42)
NEUTROPHILS # BLD AUTO: 7.48 K/UL (ref 1.82–7.42)
NEUTROPHILS # BLD AUTO: 8.68 K/UL (ref 1.82–7.42)
NEUTROPHILS NFR BLD: 58.4 % (ref 44–72)
NEUTROPHILS NFR BLD: 60.4 % (ref 44–72)
NEUTROPHILS NFR BLD: 62 % (ref 44–72)
NEUTROPHILS NFR BLD: 66.3 % (ref 44–72)
NEUTROPHILS NFR BLD: 67.7 % (ref 44–72)
NEUTROPHILS NFR BLD: 68.3 % (ref 44–72)
NEUTROPHILS NFR BLD: 69.7 % (ref 44–72)
NEUTROPHILS NFR BLD: 74.5 % (ref 44–72)
NEUTROPHILS NFR BLD: 75.9 % (ref 44–72)
NEUTROPHILS NFR BLD: 79.2 % (ref 44–72)
NEUTROPHILS NFR BLD: 86.2 % (ref 44–72)
NRBC # BLD AUTO: 0 K/UL
NRBC # BLD AUTO: 0.02 K/UL
NRBC # BLD AUTO: 0.02 K/UL
NRBC # BLD AUTO: 0.08 K/UL
NRBC BLD-RTO: 0 /100 WBC
NRBC BLD-RTO: 0.2 /100 WBC
NRBC BLD-RTO: 0.3 /100 WBC
NRBC BLD-RTO: 0.7 /100 WBC
NT-PROBNP SERPL IA-MCNC: ABNORMAL PG/ML (ref 0–125)
PATHOLOGY CONSULT NOTE: NORMAL
PHOSPHATE SERPL-MCNC: 4.8 MG/DL (ref 2.5–4.5)
PLATELET # BLD AUTO: 136 K/UL (ref 164–446)
PLATELET # BLD AUTO: 137 K/UL (ref 164–446)
PLATELET # BLD AUTO: 145 K/UL (ref 164–446)
PLATELET # BLD AUTO: 148 K/UL (ref 164–446)
PLATELET # BLD AUTO: 151 K/UL (ref 164–446)
PLATELET # BLD AUTO: 163 K/UL (ref 164–446)
PLATELET # BLD AUTO: 180 K/UL (ref 164–446)
PLATELET # BLD AUTO: 186 K/UL (ref 164–446)
PLATELET # BLD AUTO: 188 K/UL (ref 164–446)
PLATELET # BLD AUTO: 199 K/UL (ref 164–446)
PLATELET # BLD AUTO: 199 K/UL (ref 164–446)
PLATELET # BLD AUTO: 200 K/UL (ref 164–446)
PLATELET # BLD AUTO: 214 K/UL (ref 164–446)
PLATELET # BLD AUTO: 238 K/UL (ref 164–446)
PLATELET # BLD AUTO: 281 K/UL (ref 164–446)
PMV BLD AUTO: 10.2 FL (ref 9–12.9)
PMV BLD AUTO: 10.6 FL (ref 9–12.9)
PMV BLD AUTO: 10.7 FL (ref 9–12.9)
PMV BLD AUTO: 10.9 FL (ref 9–12.9)
PMV BLD AUTO: 11 FL (ref 9–12.9)
PMV BLD AUTO: 11.1 FL (ref 9–12.9)
PMV BLD AUTO: 11.3 FL (ref 9–12.9)
PMV BLD AUTO: 11.3 FL (ref 9–12.9)
PMV BLD AUTO: 11.4 FL (ref 9–12.9)
PMV BLD AUTO: 11.4 FL (ref 9–12.9)
PMV BLD AUTO: 11.5 FL (ref 9–12.9)
PMV BLD AUTO: 9.9 FL (ref 9–12.9)
POTASSIUM SERPL-SCNC: 3.4 MMOL/L (ref 3.6–5.5)
POTASSIUM SERPL-SCNC: 3.4 MMOL/L (ref 3.6–5.5)
POTASSIUM SERPL-SCNC: 3.9 MMOL/L (ref 3.6–5.5)
POTASSIUM SERPL-SCNC: 4.3 MMOL/L (ref 3.6–5.5)
POTASSIUM SERPL-SCNC: 4.4 MMOL/L (ref 3.6–5.5)
POTASSIUM SERPL-SCNC: 4.6 MMOL/L (ref 3.6–5.5)
POTASSIUM SERPL-SCNC: 4.7 MMOL/L (ref 3.6–5.5)
POTASSIUM SERPL-SCNC: 4.9 MMOL/L (ref 3.6–5.5)
POTASSIUM SERPL-SCNC: 5.1 MMOL/L (ref 3.6–5.5)
POTASSIUM SERPL-SCNC: 5.1 MMOL/L (ref 3.6–5.5)
POTASSIUM SERPL-SCNC: 5.2 MMOL/L (ref 3.6–5.5)
POTASSIUM SERPL-SCNC: 5.4 MMOL/L (ref 3.6–5.5)
POTASSIUM SERPL-SCNC: 5.4 MMOL/L (ref 3.6–5.5)
POTASSIUM SERPL-SCNC: 5.5 MMOL/L (ref 3.6–5.5)
PROCALCITONIN SERPL-MCNC: 0.06 NG/ML
PROCALCITONIN SERPL-MCNC: <0.05 NG/ML
PRODUCT TYPE UPROD: NORMAL
PROT SERPL-MCNC: 4.7 G/DL (ref 6–8.2)
PROT SERPL-MCNC: 4.7 G/DL (ref 6–8.2)
PROT SERPL-MCNC: 4.8 G/DL (ref 6–8.2)
PROT SERPL-MCNC: 6.2 G/DL (ref 6–8.2)
PROT SERPL-MCNC: 6.6 G/DL (ref 6–8.2)
PROTHROMBIN TIME: 13.8 SEC (ref 12–14.6)
PROTHROMBIN TIME: 14 SEC (ref 12–14.6)
PROTHROMBIN TIME: 14.7 SEC (ref 12–14.6)
PROTHROMBIN TIME: 14.7 SEC (ref 12–14.6)
PROTHROMBIN TIME: 15.5 SEC (ref 12–14.6)
RBC # BLD AUTO: 1.9 M/UL (ref 4.7–6.1)
RBC # BLD AUTO: 2.16 M/UL (ref 4.7–6.1)
RBC # BLD AUTO: 2.19 M/UL (ref 4.7–6.1)
RBC # BLD AUTO: 2.31 M/UL (ref 4.7–6.1)
RBC # BLD AUTO: 2.41 M/UL (ref 4.7–6.1)
RBC # BLD AUTO: 2.56 M/UL (ref 4.7–6.1)
RBC # BLD AUTO: 2.75 M/UL (ref 4.7–6.1)
RBC # BLD AUTO: 2.76 M/UL (ref 4.7–6.1)
RBC # BLD AUTO: 2.76 M/UL (ref 4.7–6.1)
RBC # BLD AUTO: 2.77 M/UL (ref 4.7–6.1)
RBC # BLD AUTO: 2.96 M/UL (ref 4.7–6.1)
RBC # BLD AUTO: 2.97 M/UL (ref 4.7–6.1)
RBC # BLD AUTO: 2.98 M/UL (ref 4.7–6.1)
RBC # BLD AUTO: 3.11 M/UL (ref 4.7–6.1)
RBC # BLD AUTO: 3.19 M/UL (ref 4.7–6.1)
RBC # BLD AUTO: 3.25 M/UL (ref 4.7–6.1)
RBC # BLD AUTO: 3.26 M/UL (ref 4.7–6.1)
RBC # BLD AUTO: 3.34 M/UL (ref 4.7–6.1)
RH BLD: NORMAL
SARS-COV-2 RDRP RESP QL NAA+PROBE: NOTDETECTED
SARS-COV-2 RDRP RESP QL NAA+PROBE: NOTDETECTED
SARS-COV-2 RNA RESP QL NAA+PROBE: NOTDETECTED
SODIUM SERPL-SCNC: 125 MMOL/L (ref 135–145)
SODIUM SERPL-SCNC: 128 MMOL/L (ref 135–145)
SODIUM SERPL-SCNC: 135 MMOL/L (ref 135–145)
SODIUM SERPL-SCNC: 136 MMOL/L (ref 135–145)
SODIUM SERPL-SCNC: 137 MMOL/L (ref 135–145)
SODIUM SERPL-SCNC: 139 MMOL/L (ref 135–145)
SODIUM SERPL-SCNC: 140 MMOL/L (ref 135–145)
SODIUM SERPL-SCNC: 140 MMOL/L (ref 135–145)
SODIUM SERPL-SCNC: 141 MMOL/L (ref 135–145)
SODIUM SERPL-SCNC: 142 MMOL/L (ref 135–145)
SPECIMEN SOURCE: NORMAL
TIBC SERPL-MCNC: 289 UG/DL (ref 250–450)
TROPONIN T SERPL-MCNC: 1498 NG/L (ref 6–19)
TROPONIN T SERPL-MCNC: 1526 NG/L (ref 6–19)
TROPONIN T SERPL-MCNC: 1580 NG/L (ref 6–19)
TROPONIN T SERPL-MCNC: 1662 NG/L (ref 6–19)
TSH SERPL DL<=0.005 MIU/L-ACNC: 1.4 UIU/ML (ref 0.38–5.33)
UIBC SERPL-MCNC: 229 UG/DL (ref 110–370)
UNIT STATUS USTAT: NORMAL
VIT B12 SERPL-MCNC: 351 PG/ML (ref 211–911)
WBC # BLD AUTO: 11.2 K/UL (ref 4.8–10.8)
WBC # BLD AUTO: 11.4 K/UL (ref 4.8–10.8)
WBC # BLD AUTO: 4.8 K/UL (ref 4.8–10.8)
WBC # BLD AUTO: 4.9 K/UL (ref 4.8–10.8)
WBC # BLD AUTO: 5.7 K/UL (ref 4.8–10.8)
WBC # BLD AUTO: 5.8 K/UL (ref 4.8–10.8)
WBC # BLD AUTO: 5.9 K/UL (ref 4.8–10.8)
WBC # BLD AUTO: 6.8 K/UL (ref 4.8–10.8)
WBC # BLD AUTO: 6.8 K/UL (ref 4.8–10.8)
WBC # BLD AUTO: 7 K/UL (ref 4.8–10.8)
WBC # BLD AUTO: 7 K/UL (ref 4.8–10.8)
WBC # BLD AUTO: 7.6 K/UL (ref 4.8–10.8)
WBC # BLD AUTO: 7.7 K/UL (ref 4.8–10.8)
WBC # BLD AUTO: 7.9 K/UL (ref 4.8–10.8)
WBC # BLD AUTO: 8.1 K/UL (ref 4.8–10.8)
WBC # BLD AUTO: 8.7 K/UL (ref 4.8–10.8)
WBC # BLD AUTO: 8.7 K/UL (ref 4.8–10.8)
WBC # BLD AUTO: 9.4 K/UL (ref 4.8–10.8)

## 2020-01-01 PROCEDURE — A9270 NON-COVERED ITEM OR SERVICE: HCPCS | Performed by: SURGERY

## 2020-01-01 PROCEDURE — 85041 AUTOMATED RBC COUNT: CPT | Mod: 91

## 2020-01-01 PROCEDURE — A9270 NON-COVERED ITEM OR SERVICE: HCPCS | Performed by: HOSPITALIST

## 2020-01-01 PROCEDURE — 665999 HH PPS REVENUE DEBIT

## 2020-01-01 PROCEDURE — 700102 HCHG RX REV CODE 250 W/ 637 OVERRIDE(OP): Performed by: INTERNAL MEDICINE

## 2020-01-01 PROCEDURE — A9270 NON-COVERED ITEM OR SERVICE: HCPCS | Performed by: INTERNAL MEDICINE

## 2020-01-01 PROCEDURE — 770006 HCHG ROOM/CARE - MED/SURG/GYN SEMI*

## 2020-01-01 PROCEDURE — 700102 HCHG RX REV CODE 250 W/ 637 OVERRIDE(OP): Performed by: NURSE PRACTITIONER

## 2020-01-01 PROCEDURE — 700105 HCHG RX REV CODE 258: Performed by: INTERNAL MEDICINE

## 2020-01-01 PROCEDURE — 80048 BASIC METABOLIC PNL TOTAL CA: CPT

## 2020-01-01 PROCEDURE — 770020 HCHG ROOM/CARE - TELE (206)

## 2020-01-01 PROCEDURE — 302129 PCA PLUS: Performed by: INTERNAL MEDICINE

## 2020-01-01 PROCEDURE — 665998 HH PPS REVENUE CREDIT

## 2020-01-01 PROCEDURE — 82962 GLUCOSE BLOOD TEST: CPT

## 2020-01-01 PROCEDURE — 99233 SBSQ HOSP IP/OBS HIGH 50: CPT | Mod: 25 | Performed by: INTERNAL MEDICINE

## 2020-01-01 PROCEDURE — 99232 SBSQ HOSP IP/OBS MODERATE 35: CPT | Performed by: INTERNAL MEDICINE

## 2020-01-01 PROCEDURE — 770022 HCHG ROOM/CARE - ICU (200)

## 2020-01-01 PROCEDURE — 85018 HEMOGLOBIN: CPT

## 2020-01-01 PROCEDURE — 83540 ASSAY OF IRON: CPT

## 2020-01-01 PROCEDURE — A9270 NON-COVERED ITEM OR SERVICE: HCPCS

## 2020-01-01 PROCEDURE — A9270 NON-COVERED ITEM OR SERVICE: HCPCS | Performed by: NURSE PRACTITIONER

## 2020-01-01 PROCEDURE — 99285 EMERGENCY DEPT VISIT HI MDM: CPT

## 2020-01-01 PROCEDURE — 99223 1ST HOSP IP/OBS HIGH 75: CPT | Performed by: INTERNAL MEDICINE

## 2020-01-01 PROCEDURE — 82607 VITAMIN B-12: CPT

## 2020-01-01 PROCEDURE — G0299 HHS/HOSPICE OF RN EA 15 MIN: HCPCS

## 2020-01-01 PROCEDURE — C9803 HOPD COVID-19 SPEC COLLECT: HCPCS | Performed by: INTERNAL MEDICINE

## 2020-01-01 PROCEDURE — 665001 SOC-HOME HEALTH

## 2020-01-01 PROCEDURE — 85025 COMPLETE CBC W/AUTO DIFF WBC: CPT

## 2020-01-01 PROCEDURE — 86901 BLOOD TYPING SEROLOGIC RH(D): CPT

## 2020-01-01 PROCEDURE — 37224 HCHG ANGIO FEMORAL/POPLITEAL ARTERY UNILATERAL: CPT | Mod: LT

## 2020-01-01 PROCEDURE — 700102 HCHG RX REV CODE 250 W/ 637 OVERRIDE(OP): Performed by: SURGERY

## 2020-01-01 PROCEDURE — 80053 COMPREHEN METABOLIC PANEL: CPT

## 2020-01-01 PROCEDURE — 700111 HCHG RX REV CODE 636 W/ 250 OVERRIDE (IP): Performed by: HOSPITALIST

## 2020-01-01 PROCEDURE — 700111 HCHG RX REV CODE 636 W/ 250 OVERRIDE (IP): Performed by: FAMILY MEDICINE

## 2020-01-01 PROCEDURE — 700111 HCHG RX REV CODE 636 W/ 250 OVERRIDE (IP): Performed by: INTERNAL MEDICINE

## 2020-01-01 PROCEDURE — 83880 ASSAY OF NATRIURETIC PEPTIDE: CPT

## 2020-01-01 PROCEDURE — 700102 HCHG RX REV CODE 250 W/ 637 OVERRIDE(OP): Performed by: STUDENT IN AN ORGANIZED HEALTH CARE EDUCATION/TRAINING PROGRAM

## 2020-01-01 PROCEDURE — 36430 TRANSFUSION BLD/BLD COMPNT: CPT

## 2020-01-01 PROCEDURE — 99223 1ST HOSP IP/OBS HIGH 75: CPT | Mod: AI | Performed by: INTERNAL MEDICINE

## 2020-01-01 PROCEDURE — 85027 COMPLETE CBC AUTOMATED: CPT

## 2020-01-01 PROCEDURE — 85730 THROMBOPLASTIN TIME PARTIAL: CPT

## 2020-01-01 PROCEDURE — 82962 GLUCOSE BLOOD TEST: CPT | Mod: 91

## 2020-01-01 PROCEDURE — 85048 AUTOMATED LEUKOCYTE COUNT: CPT | Mod: 91

## 2020-01-01 PROCEDURE — 36415 COLL VENOUS BLD VENIPUNCTURE: CPT

## 2020-01-01 PROCEDURE — 84484 ASSAY OF TROPONIN QUANT: CPT | Mod: 91

## 2020-01-01 PROCEDURE — 501837 HCHG SUTURE CV: Performed by: SURGERY

## 2020-01-01 PROCEDURE — 30233N1 TRANSFUSION OF NONAUTOLOGOUS RED BLOOD CELLS INTO PERIPHERAL VEIN, PERCUTANEOUS APPROACH: ICD-10-PCS | Performed by: INTERNAL MEDICINE

## 2020-01-01 PROCEDURE — 700102 HCHG RX REV CODE 250 W/ 637 OVERRIDE(OP): Performed by: HOSPITALIST

## 2020-01-01 PROCEDURE — G0493 RN CARE EA 15 MIN HH/HOSPICE: HCPCS

## 2020-01-01 PROCEDURE — A6402 STERILE GAUZE <= 16 SQ IN: HCPCS | Performed by: SURGERY

## 2020-01-01 PROCEDURE — 99233 SBSQ HOSP IP/OBS HIGH 50: CPT | Performed by: HOSPITALIST

## 2020-01-01 PROCEDURE — C9803 HOPD COVID-19 SPEC COLLECT: HCPCS | Performed by: SURGERY

## 2020-01-01 PROCEDURE — 700117 HCHG RX CONTRAST REV CODE 255: Performed by: SURGERY

## 2020-01-01 PROCEDURE — 71045 X-RAY EXAM CHEST 1 VIEW: CPT

## 2020-01-01 PROCEDURE — 94760 N-INVAS EAR/PLS OXIMETRY 1: CPT

## 2020-01-01 PROCEDURE — 86900 BLOOD TYPING SEROLOGIC ABO: CPT

## 2020-01-01 PROCEDURE — 85610 PROTHROMBIN TIME: CPT

## 2020-01-01 PROCEDURE — 700111 HCHG RX REV CODE 636 W/ 250 OVERRIDE (IP): Performed by: ANESTHESIOLOGY

## 2020-01-01 PROCEDURE — G0151 HHCP-SERV OF PT,EA 15 MIN: HCPCS

## 2020-01-01 PROCEDURE — 700111 HCHG RX REV CODE 636 W/ 250 OVERRIDE (IP): Performed by: SURGERY

## 2020-01-01 PROCEDURE — 700101 HCHG RX REV CODE 250: Performed by: ANESTHESIOLOGY

## 2020-01-01 PROCEDURE — 83735 ASSAY OF MAGNESIUM: CPT

## 2020-01-01 PROCEDURE — 86850 RBC ANTIBODY SCREEN: CPT

## 2020-01-01 PROCEDURE — 160048 HCHG OR STATISTICAL LEVEL 1-5: Performed by: SURGERY

## 2020-01-01 PROCEDURE — 160048 HCHG OR STATISTICAL LEVEL 1-5: Performed by: INTERNAL MEDICINE

## 2020-01-01 PROCEDURE — 99239 HOSP IP/OBS DSCHRG MGMT >30: CPT | Performed by: INTERNAL MEDICINE

## 2020-01-01 PROCEDURE — 700101 HCHG RX REV CODE 250: Performed by: SURGERY

## 2020-01-01 PROCEDURE — 84100 ASSAY OF PHOSPHORUS: CPT

## 2020-01-01 PROCEDURE — 97166 OT EVAL MOD COMPLEX 45 MIN: CPT

## 2020-01-01 PROCEDURE — C1768 GRAFT, VASCULAR: HCPCS | Performed by: SURGERY

## 2020-01-01 PROCEDURE — G0152 HHCP-SERV OF OT,EA 15 MIN: HCPCS

## 2020-01-01 PROCEDURE — 99233 SBSQ HOSP IP/OBS HIGH 50: CPT | Performed by: INTERNAL MEDICINE

## 2020-01-01 PROCEDURE — C9803 HOPD COVID-19 SPEC COLLECT: HCPCS | Performed by: EMERGENCY MEDICINE

## 2020-01-01 PROCEDURE — 99232 SBSQ HOSP IP/OBS MODERATE 35: CPT | Mod: GC | Performed by: INTERNAL MEDICINE

## 2020-01-01 PROCEDURE — 047N3ZZ DILATION OF LEFT POPLITEAL ARTERY, PERCUTANEOUS APPROACH: ICD-10-PCS | Performed by: SURGERY

## 2020-01-01 PROCEDURE — 71275 CT ANGIOGRAPHY CHEST: CPT

## 2020-01-01 PROCEDURE — 86923 COMPATIBILITY TEST ELECTRIC: CPT

## 2020-01-01 PROCEDURE — 93010 ELECTROCARDIOGRAM REPORT: CPT | Performed by: INTERNAL MEDICINE

## 2020-01-01 PROCEDURE — 84443 ASSAY THYROID STIM HORMONE: CPT

## 2020-01-01 PROCEDURE — 97162 PT EVAL MOD COMPLEX 30 MIN: CPT

## 2020-01-01 PROCEDURE — 700117 HCHG RX CONTRAST REV CODE 255: Performed by: HOSPITALIST

## 2020-01-01 PROCEDURE — 93010 ELECTROCARDIOGRAM REPORT: CPT | Mod: 76 | Performed by: INTERNAL MEDICINE

## 2020-01-01 PROCEDURE — 501445 HCHG STAPLER, SKIN DISP: Performed by: SURGERY

## 2020-01-01 PROCEDURE — 700111 HCHG RX REV CODE 636 W/ 250 OVERRIDE (IP): Performed by: NURSE PRACTITIONER

## 2020-01-01 PROCEDURE — 82746 ASSAY OF FOLIC ACID SERUM: CPT

## 2020-01-01 PROCEDURE — 700105 HCHG RX REV CODE 258: Performed by: SURGERY

## 2020-01-01 PROCEDURE — 041L0JL BYPASS LEFT FEMORAL ARTERY TO POPLITEAL ARTERY WITH SYNTHETIC SUBSTITUTE, OPEN APPROACH: ICD-10-PCS | Performed by: SURGERY

## 2020-01-01 PROCEDURE — 160029 HCHG SURGERY MINUTES - 1ST 30 MINS LEVEL 4: Performed by: SURGERY

## 2020-01-01 PROCEDURE — 96375 TX/PRO/DX INJ NEW DRUG ADDON: CPT

## 2020-01-01 PROCEDURE — 83605 ASSAY OF LACTIC ACID: CPT

## 2020-01-01 PROCEDURE — B41G1ZZ FLUOROSCOPY OF LEFT LOWER EXTREMITY ARTERIES USING LOW OSMOLAR CONTRAST: ICD-10-PCS | Performed by: SURGERY

## 2020-01-01 PROCEDURE — 700111 HCHG RX REV CODE 636 W/ 250 OVERRIDE (IP)

## 2020-01-01 PROCEDURE — 76775 US EXAM ABDO BACK WALL LIM: CPT

## 2020-01-01 PROCEDURE — 160035 HCHG PACU - 1ST 60 MINS PHASE I: Performed by: SURGERY

## 2020-01-01 PROCEDURE — 700105 HCHG RX REV CODE 258: Performed by: HOSPITALIST

## 2020-01-01 PROCEDURE — 84145 PROCALCITONIN (PCT): CPT

## 2020-01-01 PROCEDURE — 85014 HEMATOCRIT: CPT

## 2020-01-01 PROCEDURE — 85384 FIBRINOGEN ACTIVITY: CPT

## 2020-01-01 PROCEDURE — 700111 HCHG RX REV CODE 636 W/ 250 OVERRIDE (IP): Performed by: EMERGENCY MEDICINE

## 2020-01-01 PROCEDURE — 82728 ASSAY OF FERRITIN: CPT

## 2020-01-01 PROCEDURE — 85041 AUTOMATED RBC COUNT: CPT

## 2020-01-01 PROCEDURE — A9270 NON-COVERED ITEM OR SERVICE: HCPCS | Performed by: STUDENT IN AN ORGANIZED HEALTH CARE EDUCATION/TRAINING PROGRAM

## 2020-01-01 PROCEDURE — 97165 OT EVAL LOW COMPLEX 30 MIN: CPT

## 2020-01-01 PROCEDURE — 85730 THROMBOPLASTIN TIME PARTIAL: CPT | Mod: 91

## 2020-01-01 PROCEDURE — 37214 CESSJ THERAPY CATH REMOVAL: CPT

## 2020-01-01 PROCEDURE — 99233 SBSQ HOSP IP/OBS HIGH 50: CPT | Mod: GC | Performed by: INTERNAL MEDICINE

## 2020-01-01 PROCEDURE — 160041 HCHG SURGERY MINUTES - EA ADDL 1 MIN LEVEL 4: Performed by: SURGERY

## 2020-01-01 PROCEDURE — 85048 AUTOMATED LEUKOCYTE COUNT: CPT

## 2020-01-01 PROCEDURE — 0DB78ZX EXCISION OF STOMACH, PYLORUS, VIA NATURAL OR ARTIFICIAL OPENING ENDOSCOPIC, DIAGNOSTIC: ICD-10-PCS | Performed by: INTERNAL MEDICINE

## 2020-01-01 PROCEDURE — 85049 AUTOMATED PLATELET COUNT: CPT

## 2020-01-01 PROCEDURE — 501838 HCHG SUTURE GENERAL: Performed by: SURGERY

## 2020-01-01 PROCEDURE — 97161 PT EVAL LOW COMPLEX 20 MIN: CPT

## 2020-01-01 PROCEDURE — 160009 HCHG ANES TIME/MIN: Performed by: INTERNAL MEDICINE

## 2020-01-01 PROCEDURE — 3E05317 INTRODUCTION OF OTHER THROMBOLYTIC INTO PERIPHERAL ARTERY, PERCUTANEOUS APPROACH: ICD-10-PCS | Performed by: SURGERY

## 2020-01-01 PROCEDURE — 93926 LOWER EXTREMITY STUDY: CPT | Mod: LT

## 2020-01-01 PROCEDURE — 110454 HCHG SHELL REV 250: Performed by: SURGERY

## 2020-01-01 PROCEDURE — 96376 TX/PRO/DX INJ SAME DRUG ADON: CPT

## 2020-01-01 PROCEDURE — 99291 CRITICAL CARE FIRST HOUR: CPT | Performed by: INTERNAL MEDICINE

## 2020-01-01 PROCEDURE — 93306 TTE W/DOPPLER COMPLETE: CPT | Mod: 26 | Performed by: INTERNAL MEDICINE

## 2020-01-01 PROCEDURE — 770001 HCHG ROOM/CARE - MED/SURG/GYN PRIV*

## 2020-01-01 PROCEDURE — 160036 HCHG PACU - EA ADDL 30 MINS PHASE I: Performed by: SURGERY

## 2020-01-01 PROCEDURE — 93005 ELECTROCARDIOGRAM TRACING: CPT | Performed by: INTERNAL MEDICINE

## 2020-01-01 PROCEDURE — 160002 HCHG RECOVERY MINUTES (STAT): Performed by: SURGERY

## 2020-01-01 PROCEDURE — 83550 IRON BINDING TEST: CPT

## 2020-01-01 PROCEDURE — 160009 HCHG ANES TIME/MIN: Performed by: SURGERY

## 2020-01-01 PROCEDURE — 97530 THERAPEUTIC ACTIVITIES: CPT

## 2020-01-01 PROCEDURE — 700102 HCHG RX REV CODE 250 W/ 637 OVERRIDE(OP)

## 2020-01-01 PROCEDURE — G0155 HHCP-SVS OF CSW,EA 15 MIN: HCPCS

## 2020-01-01 PROCEDURE — C1725 CATH, TRANSLUMIN NON-LASER: HCPCS | Performed by: SURGERY

## 2020-01-01 PROCEDURE — 93306 TTE W/DOPPLER COMPLETE: CPT

## 2020-01-01 PROCEDURE — 99221 1ST HOSP IP/OBS SF/LOW 40: CPT | Performed by: INTERNAL MEDICINE

## 2020-01-01 PROCEDURE — 82270 OCCULT BLOOD FECES: CPT

## 2020-01-01 PROCEDURE — 160002 HCHG RECOVERY MINUTES (STAT): Performed by: INTERNAL MEDICINE

## 2020-01-01 PROCEDURE — U0004 COV-19 TEST NON-CDC HGH THRU: HCPCS

## 2020-01-01 PROCEDURE — 700101 HCHG RX REV CODE 250: Performed by: NURSE PRACTITIONER

## 2020-01-01 PROCEDURE — P9016 RBC LEUKOCYTES REDUCED: HCPCS

## 2020-01-01 PROCEDURE — 93005 ELECTROCARDIOGRAM TRACING: CPT | Performed by: HOSPITALIST

## 2020-01-01 PROCEDURE — 99153 MOD SED SAME PHYS/QHP EA: CPT

## 2020-01-01 PROCEDURE — 97535 SELF CARE MNGMENT TRAINING: CPT | Mod: CQ

## 2020-01-01 PROCEDURE — 96365 THER/PROPH/DIAG IV INF INIT: CPT

## 2020-01-01 PROCEDURE — 37226 IR-ANGIO THROUGH EXISTING CATHETER: CPT

## 2020-01-01 PROCEDURE — U0003 INFECTIOUS AGENT DETECTION BY NUCLEIC ACID (DNA OR RNA); SEVERE ACUTE RESPIRATORY SYNDROME CORONAVIRUS 2 (SARS-COV-2) (CORONAVIRUS DISEASE [COVID-19]), AMPLIFIED PROBE TECHNIQUE, MAKING USE OF HIGH THROUGHPUT TECHNOLOGIES AS DESCRIBED BY CMS-2020-01-R: HCPCS

## 2020-01-01 PROCEDURE — 500066 HCHG BITE BLOCK, ECT: Performed by: INTERNAL MEDICINE

## 2020-01-01 PROCEDURE — 502240 HCHG MISC OR SUPPLY RC 0272: Performed by: SURGERY

## 2020-01-01 PROCEDURE — 97535 SELF CARE MNGMENT TRAINING: CPT

## 2020-01-01 PROCEDURE — G0495 RN CARE TRAIN/EDU IN HH: HCPCS

## 2020-01-01 PROCEDURE — 047L3ZZ DILATION OF LEFT FEMORAL ARTERY, PERCUTANEOUS APPROACH: ICD-10-PCS | Performed by: SURGERY

## 2020-01-01 PROCEDURE — 80048 BASIC METABOLIC PNL TOTAL CA: CPT | Mod: 91

## 2020-01-01 PROCEDURE — 160035 HCHG PACU - 1ST 60 MINS PHASE I: Performed by: INTERNAL MEDICINE

## 2020-01-01 PROCEDURE — 160202 HCHG ENDO MINUTES - 1ST 30 MINS LEVEL 3: Performed by: INTERNAL MEDICINE

## 2020-01-01 PROCEDURE — 88305 TISSUE EXAM BY PATHOLOGIST: CPT

## 2020-01-01 PROCEDURE — 88312 SPECIAL STAINS GROUP 1: CPT

## 2020-01-01 PROCEDURE — 500869 HCHG NEEDLE, THINWALL 19GA (COOK): Performed by: SURGERY

## 2020-01-01 PROCEDURE — C1894 INTRO/SHEATH, NON-LASER: HCPCS | Performed by: SURGERY

## 2020-01-01 PROCEDURE — 047N3DZ DILATION OF LEFT POPLITEAL ARTERY WITH INTRALUMINAL DEVICE, PERCUTANEOUS APPROACH: ICD-10-PCS | Performed by: SURGERY

## 2020-01-01 PROCEDURE — 700105 HCHG RX REV CODE 258: Performed by: EMERGENCY MEDICINE

## 2020-01-01 PROCEDURE — 99406 BEHAV CHNG SMOKING 3-10 MIN: CPT | Performed by: INTERNAL MEDICINE

## 2020-01-01 DEVICE — GRAFT GORE PROPATEN 6MMX80CM: Type: IMPLANTABLE DEVICE | Site: LEG | Status: FUNCTIONAL

## 2020-01-01 RX ORDER — CLONIDINE HYDROCHLORIDE 0.1 MG/1
0.2 TABLET ORAL
Status: COMPLETED | OUTPATIENT
Start: 2020-01-01 | End: 2020-01-01

## 2020-01-01 RX ORDER — DEXTROSE MONOHYDRATE 25 G/50ML
50 INJECTION, SOLUTION INTRAVENOUS
Status: DISCONTINUED | OUTPATIENT
Start: 2020-01-01 | End: 2020-01-01

## 2020-01-01 RX ORDER — ATORVASTATIN CALCIUM 40 MG/1
40 TABLET, FILM COATED ORAL EVERY EVENING
Qty: 30 TAB | Refills: 0 | Status: SHIPPED | OUTPATIENT
Start: 2020-01-01

## 2020-01-01 RX ORDER — SODIUM CHLORIDE 9 MG/ML
INJECTION, SOLUTION INTRAVENOUS
Status: ACTIVE
Start: 2020-01-01 | End: 2020-01-01

## 2020-01-01 RX ORDER — AMOXICILLIN 250 MG
2 CAPSULE ORAL 2 TIMES DAILY
Status: DISCONTINUED | OUTPATIENT
Start: 2020-01-01 | End: 2020-01-01 | Stop reason: HOSPADM

## 2020-01-01 RX ORDER — SODIUM CHLORIDE, SODIUM LACTATE, POTASSIUM CHLORIDE, CALCIUM CHLORIDE 600; 310; 30; 20 MG/100ML; MG/100ML; MG/100ML; MG/100ML
INJECTION, SOLUTION INTRAVENOUS CONTINUOUS
Status: DISCONTINUED | OUTPATIENT
Start: 2020-01-01 | End: 2020-01-01 | Stop reason: HOSPADM

## 2020-01-01 RX ORDER — TAMSULOSIN HYDROCHLORIDE 0.4 MG/1
0.4 CAPSULE ORAL
Qty: 30 CAP
Start: 2020-01-01

## 2020-01-01 RX ORDER — MORPHINE SULFATE 4 MG/ML
4 INJECTION, SOLUTION INTRAMUSCULAR; INTRAVENOUS
Status: DISPENSED | OUTPATIENT
Start: 2020-01-01 | End: 2020-01-01

## 2020-01-01 RX ORDER — HYDROMORPHONE HYDROCHLORIDE 1 MG/ML
0.1 INJECTION, SOLUTION INTRAMUSCULAR; INTRAVENOUS; SUBCUTANEOUS
Status: DISCONTINUED | OUTPATIENT
Start: 2020-01-01 | End: 2020-01-01 | Stop reason: HOSPADM

## 2020-01-01 RX ORDER — ONDANSETRON 4 MG/1
4 TABLET, ORALLY DISINTEGRATING ORAL EVERY 4 HOURS PRN
Qty: 10 TAB | Refills: 0 | Status: SHIPPED | OUTPATIENT
Start: 2020-01-01 | End: 2020-01-01

## 2020-01-01 RX ORDER — OXYCODONE HYDROCHLORIDE 10 MG/1
10 TABLET ORAL EVERY 4 HOURS PRN
Status: DISCONTINUED | OUTPATIENT
Start: 2020-01-01 | End: 2020-01-01 | Stop reason: HOSPADM

## 2020-01-01 RX ORDER — SODIUM CHLORIDE 9 MG/ML
500 INJECTION, SOLUTION INTRAVENOUS
Status: DISCONTINUED | OUTPATIENT
Start: 2020-01-01 | End: 2020-01-01 | Stop reason: HOSPADM

## 2020-01-01 RX ORDER — FUROSEMIDE 20 MG/1
40 TABLET ORAL DAILY
Qty: 30 TAB | Refills: 0 | Status: SHIPPED | OUTPATIENT
Start: 2020-01-01

## 2020-01-01 RX ORDER — IODIXANOL 270 MG/ML
INJECTION, SOLUTION INTRAVASCULAR
Status: DISCONTINUED | OUTPATIENT
Start: 2020-01-01 | End: 2020-01-01 | Stop reason: HOSPADM

## 2020-01-01 RX ORDER — SCOLOPAMINE TRANSDERMAL SYSTEM 1 MG/1
1 PATCH, EXTENDED RELEASE TRANSDERMAL
Status: DISCONTINUED | OUTPATIENT
Start: 2020-01-01 | End: 2020-01-01 | Stop reason: HOSPADM

## 2020-01-01 RX ORDER — AZITHROMYCIN 250 MG/1
500 TABLET, FILM COATED ORAL DAILY
Status: DISCONTINUED | OUTPATIENT
Start: 2020-01-01 | End: 2020-01-01

## 2020-01-01 RX ORDER — DEXTROSE MONOHYDRATE, SODIUM CHLORIDE, AND POTASSIUM CHLORIDE 50; 1.49; 4.5 G/1000ML; G/1000ML; G/1000ML
INJECTION, SOLUTION INTRAVENOUS EVERY 6 HOURS
Status: DISPENSED | OUTPATIENT
Start: 2020-01-01 | End: 2020-01-01

## 2020-01-01 RX ORDER — MIDAZOLAM HYDROCHLORIDE 1 MG/ML
1 INJECTION INTRAMUSCULAR; INTRAVENOUS
Status: DISCONTINUED | OUTPATIENT
Start: 2020-01-01 | End: 2020-01-01 | Stop reason: HOSPADM

## 2020-01-01 RX ORDER — DRONABINOL 2.5 MG/1
2.5 CAPSULE ORAL
Status: DISCONTINUED | OUTPATIENT
Start: 2020-01-01 | End: 2020-01-01 | Stop reason: HOSPADM

## 2020-01-01 RX ORDER — LIDOCAINE HYDROCHLORIDE 20 MG/ML
INJECTION, SOLUTION EPIDURAL; INFILTRATION; INTRACAUDAL; PERINEURAL PRN
Status: DISCONTINUED | OUTPATIENT
Start: 2020-01-01 | End: 2020-01-01 | Stop reason: SURG

## 2020-01-01 RX ORDER — DIPHENHYDRAMINE HYDROCHLORIDE 50 MG/ML
12.5 INJECTION INTRAMUSCULAR; INTRAVENOUS
Status: DISCONTINUED | OUTPATIENT
Start: 2020-01-01 | End: 2020-01-01 | Stop reason: HOSPADM

## 2020-01-01 RX ORDER — HALOPERIDOL 5 MG/ML
1 INJECTION INTRAMUSCULAR EVERY 6 HOURS PRN
Status: DISCONTINUED | OUTPATIENT
Start: 2020-01-01 | End: 2020-01-01 | Stop reason: HOSPADM

## 2020-01-01 RX ORDER — FUROSEMIDE 10 MG/ML
20 INJECTION INTRAMUSCULAR; INTRAVENOUS
Status: DISCONTINUED | OUTPATIENT
Start: 2020-01-01 | End: 2020-01-01

## 2020-01-01 RX ORDER — PROTAMINE SULFATE 10 MG/ML
INJECTION, SOLUTION INTRAVENOUS
Status: COMPLETED
Start: 2020-01-01 | End: 2020-01-01

## 2020-01-01 RX ORDER — MIDAZOLAM HYDROCHLORIDE 1 MG/ML
INJECTION INTRAMUSCULAR; INTRAVENOUS
Status: COMPLETED
Start: 2020-01-01 | End: 2020-01-01

## 2020-01-01 RX ORDER — ASPIRIN 325 MG
325 TABLET ORAL ONCE
Status: COMPLETED | OUTPATIENT
Start: 2020-01-01 | End: 2020-01-01

## 2020-01-01 RX ORDER — ATORVASTATIN CALCIUM 40 MG/1
40 TABLET, FILM COATED ORAL EVERY EVENING
Status: DISCONTINUED | OUTPATIENT
Start: 2020-01-01 | End: 2020-01-01 | Stop reason: HOSPADM

## 2020-01-01 RX ORDER — SODIUM CHLORIDE, SODIUM LACTATE, POTASSIUM CHLORIDE, CALCIUM CHLORIDE 600; 310; 30; 20 MG/100ML; MG/100ML; MG/100ML; MG/100ML
1000 INJECTION, SOLUTION INTRAVENOUS ONCE
Status: COMPLETED | OUTPATIENT
Start: 2020-01-01 | End: 2020-01-01

## 2020-01-01 RX ORDER — NITROGLYCERIN 0.4 MG/1
0.4 TABLET SUBLINGUAL
Status: DISCONTINUED | OUTPATIENT
Start: 2020-01-01 | End: 2020-01-01 | Stop reason: HOSPADM

## 2020-01-01 RX ORDER — DEXAMETHASONE SODIUM PHOSPHATE 4 MG/ML
4 INJECTION, SOLUTION INTRA-ARTICULAR; INTRALESIONAL; INTRAMUSCULAR; INTRAVENOUS; SOFT TISSUE
Status: DISCONTINUED | OUTPATIENT
Start: 2020-01-01 | End: 2020-01-01 | Stop reason: HOSPADM

## 2020-01-01 RX ORDER — FERROUS SULFATE 325(65) MG
325 TABLET ORAL 2 TIMES DAILY WITH MEALS
Qty: 30 TAB | Refills: 0 | Status: SHIPPED | OUTPATIENT
Start: 2020-01-01 | End: 2020-01-01 | Stop reason: SDUPTHER

## 2020-01-01 RX ORDER — CLONIDINE HYDROCHLORIDE 0.1 MG/1
0.1 TABLET ORAL
Status: DISCONTINUED | OUTPATIENT
Start: 2020-01-01 | End: 2020-01-01 | Stop reason: HOSPADM

## 2020-01-01 RX ORDER — PROCHLORPERAZINE EDISYLATE 5 MG/ML
10 INJECTION INTRAMUSCULAR; INTRAVENOUS EVERY 6 HOURS PRN
Status: DISCONTINUED | OUTPATIENT
Start: 2020-01-01 | End: 2020-01-01 | Stop reason: HOSPADM

## 2020-01-01 RX ORDER — OXYCODONE HCL 5 MG/5 ML
SOLUTION, ORAL ORAL
Status: COMPLETED
Start: 2020-01-01 | End: 2020-01-01

## 2020-01-01 RX ORDER — AMOXICILLIN 250 MG
2 CAPSULE ORAL 2 TIMES DAILY
Status: DISCONTINUED | OUTPATIENT
Start: 2020-01-01 | End: 2020-01-01

## 2020-01-01 RX ORDER — IODIXANOL 270 MG/ML
20 INJECTION, SOLUTION INTRAVASCULAR ONCE
Status: COMPLETED | OUTPATIENT
Start: 2020-01-01 | End: 2020-01-01

## 2020-01-01 RX ORDER — HEPARIN SODIUM 5000 [USP'U]/100ML
INJECTION, SOLUTION INTRAVENOUS CONTINUOUS
Status: DISCONTINUED | OUTPATIENT
Start: 2020-01-01 | End: 2020-01-01

## 2020-01-01 RX ORDER — CLOPIDOGREL BISULFATE 75 MG/1
150 TABLET ORAL ONCE
Status: COMPLETED | OUTPATIENT
Start: 2020-01-01 | End: 2020-01-01

## 2020-01-01 RX ORDER — HEPARIN SODIUM 1000 [USP'U]/ML
5000 INJECTION, SOLUTION INTRAVENOUS; SUBCUTANEOUS ONCE
Status: COMPLETED | OUTPATIENT
Start: 2020-01-01 | End: 2020-01-01

## 2020-01-01 RX ORDER — ACETAMINOPHEN 500 MG
1000 TABLET ORAL EVERY 4 HOURS PRN
Status: DISCONTINUED | OUTPATIENT
Start: 2020-01-01 | End: 2020-01-01 | Stop reason: HOSPADM

## 2020-01-01 RX ORDER — HEPARIN SODIUM 1000 [USP'U]/ML
2600 INJECTION, SOLUTION INTRAVENOUS; SUBCUTANEOUS PRN
Status: DISCONTINUED | OUTPATIENT
Start: 2020-01-01 | End: 2020-01-01

## 2020-01-01 RX ORDER — SPIRONOLACTONE 25 MG/1
25 TABLET ORAL DAILY
Qty: 30 TAB | Refills: 0 | Status: SHIPPED | OUTPATIENT
Start: 2020-01-01

## 2020-01-01 RX ORDER — HYDROMORPHONE HYDROCHLORIDE 1 MG/ML
0.2 INJECTION, SOLUTION INTRAMUSCULAR; INTRAVENOUS; SUBCUTANEOUS
Status: DISCONTINUED | OUTPATIENT
Start: 2020-01-01 | End: 2020-01-01 | Stop reason: HOSPADM

## 2020-01-01 RX ORDER — OMEPRAZOLE 20 MG/1
20 CAPSULE, DELAYED RELEASE ORAL DAILY
Status: DISCONTINUED | OUTPATIENT
Start: 2020-01-01 | End: 2020-01-01 | Stop reason: HOSPADM

## 2020-01-01 RX ORDER — AZITHROMYCIN 500 MG/5ML
500 INJECTION, POWDER, LYOPHILIZED, FOR SOLUTION INTRAVENOUS EVERY 24 HOURS
Status: DISCONTINUED | OUTPATIENT
Start: 2020-01-01 | End: 2020-01-01

## 2020-01-01 RX ORDER — CLOPIDOGREL BISULFATE 75 MG/1
75 TABLET ORAL DAILY
Qty: 30 TAB
Start: 2020-01-01 | End: 2020-01-01 | Stop reason: SDUPTHER

## 2020-01-01 RX ORDER — ONDANSETRON 2 MG/ML
4 INJECTION INTRAMUSCULAR; INTRAVENOUS
Status: DISCONTINUED | OUTPATIENT
Start: 2020-01-01 | End: 2020-01-01 | Stop reason: HOSPADM

## 2020-01-01 RX ORDER — ONDANSETRON 2 MG/ML
4 INJECTION INTRAMUSCULAR; INTRAVENOUS PRN
Status: DISCONTINUED | OUTPATIENT
Start: 2020-01-01 | End: 2020-01-01 | Stop reason: HOSPADM

## 2020-01-01 RX ORDER — NITROGLYCERIN 0.4 MG/1
0.4 TABLET SUBLINGUAL PRN
Qty: 25 TAB | Refills: 0 | Status: SHIPPED | OUTPATIENT
Start: 2020-01-01 | End: 2020-01-01 | Stop reason: SDUPTHER

## 2020-01-01 RX ORDER — DEXTROSE AND SODIUM CHLORIDE 5; .9 G/100ML; G/100ML
INJECTION, SOLUTION INTRAVENOUS CONTINUOUS
Status: DISCONTINUED | OUTPATIENT
Start: 2020-01-01 | End: 2020-01-01

## 2020-01-01 RX ORDER — DEXAMETHASONE SODIUM PHOSPHATE 4 MG/ML
INJECTION, SOLUTION INTRA-ARTICULAR; INTRALESIONAL; INTRAMUSCULAR; INTRAVENOUS; SOFT TISSUE PRN
Status: DISCONTINUED | OUTPATIENT
Start: 2020-01-01 | End: 2020-01-01 | Stop reason: SURG

## 2020-01-01 RX ORDER — POLYETHYLENE GLYCOL 3350 17 G/17G
1 POWDER, FOR SOLUTION ORAL
Status: DISCONTINUED | OUTPATIENT
Start: 2020-01-01 | End: 2020-01-01 | Stop reason: HOSPADM

## 2020-01-01 RX ORDER — OXYCODONE HCL 5 MG/5 ML
10 SOLUTION, ORAL ORAL
Status: COMPLETED | OUTPATIENT
Start: 2020-01-01 | End: 2020-01-01

## 2020-01-01 RX ORDER — SODIUM CHLORIDE 9 MG/ML
INJECTION, SOLUTION INTRAVENOUS
Status: DISCONTINUED
Start: 2020-01-01 | End: 2020-01-01

## 2020-01-01 RX ORDER — FERROUS SULFATE 325(65) MG
325 TABLET ORAL 2 TIMES DAILY WITH MEALS
Status: DISCONTINUED | OUTPATIENT
Start: 2020-01-01 | End: 2020-01-01 | Stop reason: HOSPADM

## 2020-01-01 RX ORDER — HYDROMORPHONE HYDROCHLORIDE 1 MG/ML
0.4 INJECTION, SOLUTION INTRAMUSCULAR; INTRAVENOUS; SUBCUTANEOUS
Status: DISCONTINUED | OUTPATIENT
Start: 2020-01-01 | End: 2020-01-01 | Stop reason: HOSPADM

## 2020-01-01 RX ORDER — PROTAMINE SULFATE 10 MG/ML
INJECTION, SOLUTION INTRAVENOUS PRN
Status: DISCONTINUED | OUTPATIENT
Start: 2020-01-01 | End: 2020-01-01 | Stop reason: SURG

## 2020-01-01 RX ORDER — LABETALOL HYDROCHLORIDE 5 MG/ML
10 INJECTION, SOLUTION INTRAVENOUS EVERY 4 HOURS PRN
Status: DISCONTINUED | OUTPATIENT
Start: 2020-01-01 | End: 2020-01-01 | Stop reason: HOSPADM

## 2020-01-01 RX ORDER — NITROGLYCERIN 0.4 MG/1
0.4 TABLET SUBLINGUAL PRN
Qty: 25 TAB | Refills: 0 | Status: SHIPPED | OUTPATIENT
Start: 2020-01-01

## 2020-01-01 RX ORDER — SODIUM CHLORIDE 9 MG/ML
INJECTION, SOLUTION INTRAVENOUS CONTINUOUS
Status: DISCONTINUED | OUTPATIENT
Start: 2020-01-01 | End: 2020-01-01

## 2020-01-01 RX ORDER — OMEPRAZOLE 20 MG/1
20 CAPSULE, DELAYED RELEASE ORAL DAILY
Qty: 30 CAP
Start: 2020-01-01 | End: 2020-01-01 | Stop reason: SDUPTHER

## 2020-01-01 RX ORDER — AMOXICILLIN 250 MG
2 CAPSULE ORAL 2 TIMES DAILY
Qty: 30 TAB | Refills: 0 | Status: SHIPPED | OUTPATIENT
Start: 2020-01-01 | End: 2020-01-01

## 2020-01-01 RX ORDER — ACETAMINOPHEN 325 MG/1
650 TABLET ORAL EVERY 6 HOURS PRN
Qty: 30 TAB | Refills: 0 | Status: SHIPPED | OUTPATIENT
Start: 2020-01-01 | End: 2020-01-01

## 2020-01-01 RX ORDER — METOPROLOL SUCCINATE 50 MG/1
50 TABLET, EXTENDED RELEASE ORAL DAILY
Status: DISCONTINUED | OUTPATIENT
Start: 2020-01-01 | End: 2020-01-01

## 2020-01-01 RX ORDER — METOPROLOL TARTRATE 1 MG/ML
1 INJECTION, SOLUTION INTRAVENOUS
Status: DISCONTINUED | OUTPATIENT
Start: 2020-01-01 | End: 2020-01-01 | Stop reason: HOSPADM

## 2020-01-01 RX ORDER — LOSARTAN POTASSIUM 25 MG/1
25 TABLET ORAL DAILY
Qty: 30 TAB | Refills: 0 | Status: SHIPPED | OUTPATIENT
Start: 2020-01-01

## 2020-01-01 RX ORDER — LABETALOL HYDROCHLORIDE 5 MG/ML
5 INJECTION, SOLUTION INTRAVENOUS
Status: DISCONTINUED | OUTPATIENT
Start: 2020-01-01 | End: 2020-01-01 | Stop reason: HOSPADM

## 2020-01-01 RX ORDER — GABAPENTIN 300 MG/1
300 CAPSULE ORAL 3 TIMES DAILY
Status: DISCONTINUED | OUTPATIENT
Start: 2020-01-01 | End: 2020-01-01 | Stop reason: HOSPADM

## 2020-01-01 RX ORDER — METOPROLOL SUCCINATE 25 MG/1
25 TABLET, EXTENDED RELEASE ORAL DAILY
Status: DISCONTINUED | OUTPATIENT
Start: 2020-01-01 | End: 2020-01-01

## 2020-01-01 RX ORDER — OXYCODONE HYDROCHLORIDE 10 MG/1
10 TABLET ORAL EVERY 6 HOURS PRN
Qty: 12 TAB | Refills: 0 | Status: SHIPPED | OUTPATIENT
Start: 2020-01-01 | End: 2020-01-01

## 2020-01-01 RX ORDER — METOPROLOL SUCCINATE 50 MG/1
50 TABLET, EXTENDED RELEASE ORAL DAILY
Status: DISCONTINUED | OUTPATIENT
Start: 2020-01-01 | End: 2020-01-01 | Stop reason: HOSPADM

## 2020-01-01 RX ORDER — OXYCODONE HYDROCHLORIDE 5 MG/1
5 TABLET ORAL EVERY 4 HOURS PRN
Status: DISCONTINUED | OUTPATIENT
Start: 2020-01-01 | End: 2020-01-01 | Stop reason: HOSPADM

## 2020-01-01 RX ORDER — IODIXANOL 270 MG/ML
80 INJECTION, SOLUTION INTRAVASCULAR ONCE
Status: COMPLETED | OUTPATIENT
Start: 2020-01-01 | End: 2020-01-01

## 2020-01-01 RX ORDER — HALOPERIDOL 5 MG/ML
1 INJECTION INTRAMUSCULAR
Status: DISCONTINUED | OUTPATIENT
Start: 2020-01-01 | End: 2020-01-01 | Stop reason: HOSPADM

## 2020-01-01 RX ORDER — POLYETHYLENE GLYCOL 3350 17 G/17G
17 POWDER, FOR SOLUTION ORAL
Refills: 3
Start: 2020-01-01 | End: 2020-01-01

## 2020-01-01 RX ORDER — ONDANSETRON 2 MG/ML
4 INJECTION INTRAMUSCULAR; INTRAVENOUS EVERY 4 HOURS PRN
Status: DISCONTINUED | OUTPATIENT
Start: 2020-01-01 | End: 2020-01-01 | Stop reason: HOSPADM

## 2020-01-01 RX ORDER — BISACODYL 10 MG
10 SUPPOSITORY, RECTAL RECTAL
Refills: 0
Start: 2020-01-01 | End: 2020-01-01

## 2020-01-01 RX ORDER — ONDANSETRON 4 MG/1
4 TABLET, ORALLY DISINTEGRATING ORAL EVERY 4 HOURS PRN
Status: DISCONTINUED | OUTPATIENT
Start: 2020-01-01 | End: 2020-01-01 | Stop reason: HOSPADM

## 2020-01-01 RX ORDER — DEXTROSE MONOHYDRATE 25 G/50ML
50 INJECTION, SOLUTION INTRAVENOUS
Status: DISCONTINUED | OUTPATIENT
Start: 2020-01-01 | End: 2020-01-01 | Stop reason: HOSPADM

## 2020-01-01 RX ORDER — HYDRALAZINE HYDROCHLORIDE 20 MG/ML
10 INJECTION INTRAMUSCULAR; INTRAVENOUS
Status: COMPLETED | OUTPATIENT
Start: 2020-01-01 | End: 2020-01-01

## 2020-01-01 RX ORDER — TAMSULOSIN HYDROCHLORIDE 0.4 MG/1
0.4 CAPSULE ORAL
Status: DISCONTINUED | OUTPATIENT
Start: 2020-01-01 | End: 2020-01-01 | Stop reason: HOSPADM

## 2020-01-01 RX ORDER — POTASSIUM CHLORIDE 20 MEQ/1
40 TABLET, EXTENDED RELEASE ORAL DAILY
Status: DISCONTINUED | OUTPATIENT
Start: 2020-01-01 | End: 2020-01-01 | Stop reason: HOSPADM

## 2020-01-01 RX ORDER — BISACODYL 10 MG
10 SUPPOSITORY, RECTAL RECTAL
Status: DISCONTINUED | OUTPATIENT
Start: 2020-01-01 | End: 2020-01-01 | Stop reason: HOSPADM

## 2020-01-01 RX ORDER — MIDAZOLAM HYDROCHLORIDE 1 MG/ML
.5-2 INJECTION INTRAMUSCULAR; INTRAVENOUS PRN
Status: DISCONTINUED | OUTPATIENT
Start: 2020-01-01 | End: 2020-01-01 | Stop reason: HOSPADM

## 2020-01-01 RX ORDER — BUPIVACAINE HYDROCHLORIDE 5 MG/ML
INJECTION, SOLUTION EPIDURAL; INTRACAUDAL
Status: DISCONTINUED | OUTPATIENT
Start: 2020-01-01 | End: 2020-01-01 | Stop reason: HOSPADM

## 2020-01-01 RX ORDER — METOPROLOL SUCCINATE 25 MG/1
25 TABLET, EXTENDED RELEASE ORAL ONCE
Status: COMPLETED | OUTPATIENT
Start: 2020-01-01 | End: 2020-01-01

## 2020-01-01 RX ORDER — DRONABINOL 2.5 MG/1
2.5 CAPSULE ORAL
Status: DISCONTINUED | OUTPATIENT
Start: 2020-01-01 | End: 2020-01-01

## 2020-01-01 RX ORDER — HEPARIN SODIUM,PORCINE 1000/ML
VIAL (ML) INJECTION
Status: DISCONTINUED | OUTPATIENT
Start: 2020-01-01 | End: 2020-01-01 | Stop reason: HOSPADM

## 2020-01-01 RX ORDER — FUROSEMIDE 10 MG/ML
40 INJECTION INTRAMUSCULAR; INTRAVENOUS
Status: DISCONTINUED | OUTPATIENT
Start: 2020-01-01 | End: 2020-01-01 | Stop reason: HOSPADM

## 2020-01-01 RX ORDER — CLOPIDOGREL BISULFATE 75 MG/1
75 TABLET ORAL DAILY
Qty: 30 TAB | Refills: 0 | Status: SHIPPED | OUTPATIENT
Start: 2020-01-01

## 2020-01-01 RX ORDER — ATORVASTATIN CALCIUM 40 MG/1
40 TABLET, FILM COATED ORAL EVERY EVENING
Qty: 30 TAB
Start: 2020-01-01 | End: 2020-01-01 | Stop reason: SDUPTHER

## 2020-01-01 RX ORDER — HYDRALAZINE HYDROCHLORIDE 20 MG/ML
10 INJECTION INTRAMUSCULAR; INTRAVENOUS EVERY 4 HOURS PRN
Status: DISCONTINUED | OUTPATIENT
Start: 2020-01-01 | End: 2020-01-01 | Stop reason: HOSPADM

## 2020-01-01 RX ORDER — POTASSIUM CHLORIDE 750 MG/1
20 TABLET, EXTENDED RELEASE ORAL DAILY
Qty: 30 TAB | Refills: 0 | Status: SHIPPED | OUTPATIENT
Start: 2020-01-01

## 2020-01-01 RX ORDER — HEPARIN SODIUM,PORCINE 1000/ML
VIAL (ML) INJECTION
Status: COMPLETED
Start: 2020-01-01 | End: 2020-01-01

## 2020-01-01 RX ORDER — SODIUM CHLORIDE, SODIUM LACTATE, POTASSIUM CHLORIDE, CALCIUM CHLORIDE 600; 310; 30; 20 MG/100ML; MG/100ML; MG/100ML; MG/100ML
INJECTION, SOLUTION INTRAVENOUS CONTINUOUS
Status: ACTIVE | OUTPATIENT
Start: 2020-01-01 | End: 2020-01-01

## 2020-01-01 RX ORDER — LOSARTAN POTASSIUM 25 MG/1
25 TABLET ORAL
Status: DISCONTINUED | OUTPATIENT
Start: 2020-01-01 | End: 2020-01-01

## 2020-01-01 RX ORDER — DEXTROSE MONOHYDRATE 100 MG/ML
INJECTION, SOLUTION INTRAVENOUS CONTINUOUS
Status: DISCONTINUED | OUTPATIENT
Start: 2020-01-01 | End: 2020-01-01

## 2020-01-01 RX ORDER — ALPRAZOLAM 0.25 MG/1
0.25 TABLET ORAL ONCE
Status: COMPLETED | OUTPATIENT
Start: 2020-01-01 | End: 2020-01-01

## 2020-01-01 RX ORDER — OXYCODONE HCL 5 MG/5 ML
5 SOLUTION, ORAL ORAL
Status: COMPLETED | OUTPATIENT
Start: 2020-01-01 | End: 2020-01-01

## 2020-01-01 RX ORDER — FERROUS SULFATE 325(65) MG
325 TABLET ORAL 2 TIMES DAILY WITH MEALS
Qty: 30 TAB | Refills: 0 | Status: SHIPPED | OUTPATIENT
Start: 2020-01-01

## 2020-01-01 RX ORDER — CEFAZOLIN SODIUM 1 G/3ML
INJECTION, POWDER, FOR SOLUTION INTRAMUSCULAR; INTRAVENOUS PRN
Status: DISCONTINUED | OUTPATIENT
Start: 2020-01-01 | End: 2020-01-01 | Stop reason: SURG

## 2020-01-01 RX ORDER — ACETAMINOPHEN 325 MG/1
650 TABLET ORAL EVERY 6 HOURS
Status: DISPENSED | OUTPATIENT
Start: 2020-01-01 | End: 2020-01-01

## 2020-01-01 RX ORDER — OXYCODONE HYDROCHLORIDE 5 MG/1
5 TABLET ORAL EVERY 4 HOURS PRN
Qty: 12 TAB | Refills: 0 | Status: SHIPPED | OUTPATIENT
Start: 2020-01-01 | End: 2020-01-01

## 2020-01-01 RX ORDER — CLOPIDOGREL BISULFATE 75 MG/1
75 TABLET ORAL DAILY
Status: DISCONTINUED | OUTPATIENT
Start: 2020-01-01 | End: 2020-01-01

## 2020-01-01 RX ORDER — DIPHENHYDRAMINE HYDROCHLORIDE 50 MG/ML
25 INJECTION INTRAMUSCULAR; INTRAVENOUS EVERY 6 HOURS PRN
Status: DISCONTINUED | OUTPATIENT
Start: 2020-01-01 | End: 2020-01-01 | Stop reason: HOSPADM

## 2020-01-01 RX ORDER — ACETAMINOPHEN 325 MG/1
650 TABLET ORAL EVERY 6 HOURS PRN
Status: DISCONTINUED | OUTPATIENT
Start: 2020-01-01 | End: 2020-01-01 | Stop reason: HOSPADM

## 2020-01-01 RX ORDER — MEPERIDINE HYDROCHLORIDE 25 MG/ML
12.5 INJECTION INTRAMUSCULAR; INTRAVENOUS; SUBCUTANEOUS
Status: DISCONTINUED | OUTPATIENT
Start: 2020-01-01 | End: 2020-01-01 | Stop reason: HOSPADM

## 2020-01-01 RX ORDER — OXYCODONE HYDROCHLORIDE 5 MG/1
5 TABLET ORAL EVERY 4 HOURS PRN
Status: DISCONTINUED | OUTPATIENT
Start: 2020-01-01 | End: 2020-01-01

## 2020-01-01 RX ORDER — OMEPRAZOLE 20 MG/1
20 CAPSULE, DELAYED RELEASE ORAL DAILY
Qty: 30 CAP | Refills: 0 | Status: SHIPPED | OUTPATIENT
Start: 2020-01-01

## 2020-01-01 RX ORDER — LOSARTAN POTASSIUM 50 MG/1
50 TABLET ORAL
Status: DISCONTINUED | OUTPATIENT
Start: 2020-01-01 | End: 2020-01-01 | Stop reason: HOSPADM

## 2020-01-01 RX ORDER — LOSARTAN POTASSIUM 25 MG/1
25 TABLET ORAL DAILY
Qty: 30 TAB
Start: 2020-01-01 | End: 2020-01-01 | Stop reason: SDUPTHER

## 2020-01-01 RX ORDER — ASPIRIN 81 MG/1
81 TABLET ORAL DAILY
Qty: 30 TAB
Start: 2020-01-01 | End: 2020-01-01

## 2020-01-01 RX ORDER — KETOROLAC TROMETHAMINE 30 MG/ML
15 INJECTION, SOLUTION INTRAMUSCULAR; INTRAVENOUS EVERY 6 HOURS
Status: COMPLETED | OUTPATIENT
Start: 2020-01-01 | End: 2020-01-01

## 2020-01-01 RX ADMIN — PROTAMINE SULFATE 30 MG: 10 INJECTION, SOLUTION INTRAVENOUS at 11:21

## 2020-01-01 RX ADMIN — OXYCODONE HYDROCHLORIDE 10 MG: 10 TABLET ORAL at 02:07

## 2020-01-01 RX ADMIN — DRONABINOL 2.5 MG: 2.5 CAPSULE ORAL at 18:09

## 2020-01-01 RX ADMIN — ACETAMINOPHEN 650 MG: 325 TABLET, FILM COATED ORAL at 06:31

## 2020-01-01 RX ADMIN — PATIROMER 8.4 G: 8.4 POWDER, FOR SUSPENSION ORAL at 03:20

## 2020-01-01 RX ADMIN — ACETAMINOPHEN 650 MG: 325 TABLET, FILM COATED ORAL at 00:55

## 2020-01-01 RX ADMIN — Medication: at 00:40

## 2020-01-01 RX ADMIN — GABAPENTIN 300 MG: 300 CAPSULE ORAL at 12:10

## 2020-01-01 RX ADMIN — FUROSEMIDE 20 MG: 10 INJECTION, SOLUTION INTRAMUSCULAR; INTRAVENOUS at 17:46

## 2020-01-01 RX ADMIN — ACETAMINOPHEN 650 MG: 325 TABLET, FILM COATED ORAL at 19:09

## 2020-01-01 RX ADMIN — OXYCODONE HYDROCHLORIDE 10 MG: 10 TABLET ORAL at 05:05

## 2020-01-01 RX ADMIN — OXYCODONE HYDROCHLORIDE 10 MG: 10 TABLET ORAL at 21:18

## 2020-01-01 RX ADMIN — FUROSEMIDE 40 MG: 10 INJECTION, SOLUTION INTRAMUSCULAR; INTRAVENOUS at 18:06

## 2020-01-01 RX ADMIN — POVIDONE-IODINE 15 ML: 10 SOLUTION TOPICAL at 12:03

## 2020-01-01 RX ADMIN — KETOROLAC TROMETHAMINE 15 MG: 30 INJECTION, SOLUTION INTRAMUSCULAR at 17:50

## 2020-01-01 RX ADMIN — SODIUM CHLORIDE, POTASSIUM CHLORIDE, SODIUM LACTATE AND CALCIUM CHLORIDE 1000 ML: 600; 310; 30; 20 INJECTION, SOLUTION INTRAVENOUS at 22:30

## 2020-01-01 RX ADMIN — TAMSULOSIN HYDROCHLORIDE 0.4 MG: 0.4 CAPSULE ORAL at 07:59

## 2020-01-01 RX ADMIN — METOPROLOL SUCCINATE 25 MG: 25 TABLET, EXTENDED RELEASE ORAL at 06:04

## 2020-01-01 RX ADMIN — MORPHINE SULFATE 2 MG: 4 INJECTION INTRAVENOUS at 19:44

## 2020-01-01 RX ADMIN — SODIUM CHLORIDE: 9 INJECTION, SOLUTION INTRAVENOUS at 02:11

## 2020-01-01 RX ADMIN — GABAPENTIN 300 MG: 300 CAPSULE ORAL at 11:52

## 2020-01-01 RX ADMIN — FUROSEMIDE 40 MG: 10 INJECTION, SOLUTION INTRAMUSCULAR; INTRAVENOUS at 15:45

## 2020-01-01 RX ADMIN — GABAPENTIN 300 MG: 300 CAPSULE ORAL at 05:12

## 2020-01-01 RX ADMIN — ONDANSETRON 4 MG: 2 INJECTION INTRAMUSCULAR; INTRAVENOUS at 11:19

## 2020-01-01 RX ADMIN — OXYCODONE 5 MG: 5 TABLET ORAL at 21:15

## 2020-01-01 RX ADMIN — GABAPENTIN 400 MG: 400 CAPSULE ORAL at 05:05

## 2020-01-01 RX ADMIN — IODIXANOL 80 ML: 270 INJECTION, SOLUTION INTRAVASCULAR at 19:15

## 2020-01-01 RX ADMIN — HYDRALAZINE HYDROCHLORIDE 10 MG: 20 INJECTION INTRAMUSCULAR; INTRAVENOUS at 12:54

## 2020-01-01 RX ADMIN — HEPARIN SODIUM 1050 UNITS/HR: 5000 INJECTION, SOLUTION INTRAVENOUS at 23:00

## 2020-01-01 RX ADMIN — ASPIRIN 81 MG: 81 TABLET, COATED ORAL at 05:39

## 2020-01-01 RX ADMIN — ASPIRIN 81 MG: 81 TABLET, COATED ORAL at 11:27

## 2020-01-01 RX ADMIN — INSULIN HUMAN 2 UNITS: 100 INJECTION, SOLUTION PARENTERAL at 17:48

## 2020-01-01 RX ADMIN — GABAPENTIN 300 MG: 300 CAPSULE ORAL at 19:09

## 2020-01-01 RX ADMIN — FENTANYL CITRATE 25 MCG: 50 INJECTION INTRAMUSCULAR; INTRAVENOUS at 21:15

## 2020-01-01 RX ADMIN — FUROSEMIDE 40 MG: 10 INJECTION, SOLUTION INTRAMUSCULAR; INTRAVENOUS at 04:18

## 2020-01-01 RX ADMIN — GABAPENTIN 400 MG: 400 CAPSULE ORAL at 17:07

## 2020-01-01 RX ADMIN — GABAPENTIN 300 MG: 300 CAPSULE ORAL at 18:05

## 2020-01-01 RX ADMIN — KETOROLAC TROMETHAMINE 15 MG: 30 INJECTION, SOLUTION INTRAMUSCULAR at 12:42

## 2020-01-01 RX ADMIN — HEPARIN SODIUM 5000 UNITS: 1000 INJECTION, SOLUTION INTRAVENOUS; SUBCUTANEOUS at 21:23

## 2020-01-01 RX ADMIN — OMEPRAZOLE 20 MG: 20 CAPSULE, DELAYED RELEASE ORAL at 04:25

## 2020-01-01 RX ADMIN — ACETAMINOPHEN 650 MG: 325 TABLET, FILM COATED ORAL at 12:39

## 2020-01-01 RX ADMIN — DOCUSATE SODIUM 50 MG AND SENNOSIDES 8.6 MG 2 TABLET: 8.6; 5 TABLET, FILM COATED ORAL at 17:44

## 2020-01-01 RX ADMIN — MIDAZOLAM HYDROCHLORIDE 0.5 MG: 1 INJECTION INTRAMUSCULAR; INTRAVENOUS at 17:44

## 2020-01-01 RX ADMIN — SODIUM CHLORIDE: 9 INJECTION, SOLUTION INTRAVENOUS at 22:48

## 2020-01-01 RX ADMIN — KETOROLAC TROMETHAMINE 15 MG: 30 INJECTION, SOLUTION INTRAMUSCULAR at 11:58

## 2020-01-01 RX ADMIN — FENTANYL CITRATE 25 MCG: 0.05 INJECTION, SOLUTION INTRAMUSCULAR; INTRAVENOUS at 12:00

## 2020-01-01 RX ADMIN — TAMSULOSIN HYDROCHLORIDE 0.4 MG: 0.4 CAPSULE ORAL at 08:39

## 2020-01-01 RX ADMIN — FENTANYL CITRATE 25 MCG: 50 INJECTION INTRAMUSCULAR; INTRAVENOUS at 17:58

## 2020-01-01 RX ADMIN — MORPHINE SULFATE 2 MG: 4 INJECTION INTRAVENOUS at 07:59

## 2020-01-01 RX ADMIN — MIDAZOLAM HYDROCHLORIDE 1 MG: 1 INJECTION, SOLUTION INTRAMUSCULAR; INTRAVENOUS at 19:20

## 2020-01-01 RX ADMIN — DEXAMETHASONE SODIUM PHOSPHATE 4 MG: 4 INJECTION, SOLUTION INTRA-ARTICULAR; INTRALESIONAL; INTRAMUSCULAR; INTRAVENOUS; SOFT TISSUE at 10:44

## 2020-01-01 RX ADMIN — FERROUS SULFATE TAB 325 MG (65 MG ELEMENTAL FE) 325 MG: 325 (65 FE) TAB at 08:30

## 2020-01-01 RX ADMIN — PROCHLORPERAZINE EDISYLATE 10 MG: 5 INJECTION INTRAMUSCULAR; INTRAVENOUS at 09:28

## 2020-01-01 RX ADMIN — KETOROLAC TROMETHAMINE 15 MG: 30 INJECTION, SOLUTION INTRAMUSCULAR at 23:32

## 2020-01-01 RX ADMIN — TAMSULOSIN HYDROCHLORIDE 0.4 MG: 0.4 CAPSULE ORAL at 08:05

## 2020-01-01 RX ADMIN — GABAPENTIN 400 MG: 400 CAPSULE ORAL at 04:30

## 2020-01-01 RX ADMIN — DOCUSATE SODIUM 50 MG AND SENNOSIDES 8.6 MG 2 TABLET: 8.6; 5 TABLET, FILM COATED ORAL at 04:15

## 2020-01-01 RX ADMIN — ATORVASTATIN CALCIUM 40 MG: 40 TABLET, FILM COATED ORAL at 17:54

## 2020-01-01 RX ADMIN — FERROUS SULFATE TAB 325 MG (65 MG ELEMENTAL FE) 325 MG: 325 (65 FE) TAB at 18:09

## 2020-01-01 RX ADMIN — PROPOFOL 150 MG: 10 INJECTION, EMULSION INTRAVENOUS at 10:16

## 2020-01-01 RX ADMIN — CEFTRIAXONE SODIUM 1 G: 1 INJECTION, POWDER, FOR SOLUTION INTRAMUSCULAR; INTRAVENOUS at 06:23

## 2020-01-01 RX ADMIN — GABAPENTIN 300 MG: 300 CAPSULE ORAL at 13:00

## 2020-01-01 RX ADMIN — ATORVASTATIN CALCIUM 40 MG: 40 TABLET, FILM COATED ORAL at 16:46

## 2020-01-01 RX ADMIN — ONDANSETRON 4 MG: 2 INJECTION INTRAMUSCULAR; INTRAVENOUS at 18:13

## 2020-01-01 RX ADMIN — METOPROLOL SUCCINATE 25 MG: 25 TABLET, EXTENDED RELEASE ORAL at 05:39

## 2020-01-01 RX ADMIN — SODIUM CHLORIDE: 9 INJECTION, SOLUTION INTRAVENOUS at 14:35

## 2020-01-01 RX ADMIN — ACETAMINOPHEN 650 MG: 325 TABLET, FILM COATED ORAL at 21:16

## 2020-01-01 RX ADMIN — LOSARTAN POTASSIUM 25 MG: 25 TABLET, FILM COATED ORAL at 04:15

## 2020-01-01 RX ADMIN — CLOPIDOGREL BISULFATE 75 MG: 75 TABLET ORAL at 06:04

## 2020-01-01 RX ADMIN — KETOROLAC TROMETHAMINE 15 MG: 30 INJECTION, SOLUTION INTRAMUSCULAR at 05:44

## 2020-01-01 RX ADMIN — ONDANSETRON 4 MG: 2 INJECTION INTRAMUSCULAR; INTRAVENOUS at 12:39

## 2020-01-01 RX ADMIN — KETOROLAC TROMETHAMINE 15 MG: 30 INJECTION, SOLUTION INTRAMUSCULAR at 05:12

## 2020-01-01 RX ADMIN — FUROSEMIDE 40 MG: 10 INJECTION, SOLUTION INTRAMUSCULAR; INTRAVENOUS at 05:00

## 2020-01-01 RX ADMIN — OXYCODONE HYDROCHLORIDE 10 MG: 10 TABLET ORAL at 08:11

## 2020-01-01 RX ADMIN — ACETAMINOPHEN 650 MG: 325 TABLET, FILM COATED ORAL at 17:54

## 2020-01-01 RX ADMIN — OXYCODONE 5 MG: 5 TABLET ORAL at 22:31

## 2020-01-01 RX ADMIN — DEXTROSE AND SODIUM CHLORIDE: 5; 900 INJECTION, SOLUTION INTRAVENOUS at 09:29

## 2020-01-01 RX ADMIN — KETOROLAC TROMETHAMINE 15 MG: 30 INJECTION, SOLUTION INTRAMUSCULAR at 00:09

## 2020-01-01 RX ADMIN — INSULIN HUMAN 2 UNITS: 100 INJECTION, SOLUTION PARENTERAL at 12:56

## 2020-01-01 RX ADMIN — RIVAROXABAN 2.5 MG: 2.5 TABLET, FILM COATED ORAL at 18:26

## 2020-01-01 RX ADMIN — FERROUS SULFATE TAB 325 MG (65 MG ELEMENTAL FE) 325 MG: 325 (65 FE) TAB at 09:02

## 2020-01-01 RX ADMIN — ATORVASTATIN CALCIUM 40 MG: 40 TABLET, FILM COATED ORAL at 18:09

## 2020-01-01 RX ADMIN — GABAPENTIN 300 MG: 300 CAPSULE ORAL at 12:49

## 2020-01-01 RX ADMIN — SODIUM CHLORIDE: 9 INJECTION, SOLUTION INTRAVENOUS at 17:08

## 2020-01-01 RX ADMIN — RIVAROXABAN 2.5 MG: 2.5 TABLET, FILM COATED ORAL at 17:53

## 2020-01-01 RX ADMIN — DRONABINOL 2.5 MG: 2.5 CAPSULE ORAL at 12:10

## 2020-01-01 RX ADMIN — FENTANYL CITRATE 50 MCG: 50 INJECTION INTRAMUSCULAR; INTRAVENOUS at 18:40

## 2020-01-01 RX ADMIN — OMEPRAZOLE 20 MG: 20 CAPSULE, DELAYED RELEASE ORAL at 06:31

## 2020-01-01 RX ADMIN — GABAPENTIN 300 MG: 300 CAPSULE ORAL at 05:01

## 2020-01-01 RX ADMIN — GABAPENTIN 300 MG: 300 CAPSULE ORAL at 04:15

## 2020-01-01 RX ADMIN — GABAPENTIN 300 MG: 300 CAPSULE ORAL at 06:31

## 2020-01-01 RX ADMIN — RIVAROXABAN 2.5 MG: 2.5 TABLET, FILM COATED ORAL at 18:13

## 2020-01-01 RX ADMIN — METOPROLOL SUCCINATE 25 MG: 25 TABLET, EXTENDED RELEASE ORAL at 04:16

## 2020-01-01 RX ADMIN — MAGNESIUM HYDROXIDE 30 ML: 400 SUSPENSION ORAL at 09:27

## 2020-01-01 RX ADMIN — METOPROLOL SUCCINATE 50 MG: 50 TABLET, EXTENDED RELEASE ORAL at 05:00

## 2020-01-01 RX ADMIN — SODIUM CHLORIDE: 9 INJECTION, SOLUTION INTRAVENOUS at 11:35

## 2020-01-01 RX ADMIN — MIDAZOLAM HYDROCHLORIDE 0.5 MG: 1 INJECTION, SOLUTION INTRAMUSCULAR; INTRAVENOUS at 17:44

## 2020-01-01 RX ADMIN — GABAPENTIN 300 MG: 300 CAPSULE ORAL at 16:46

## 2020-01-01 RX ADMIN — SENNOSIDES AND DOCUSATE SODIUM 2 TABLET: 8.6; 5 TABLET ORAL at 04:31

## 2020-01-01 RX ADMIN — MORPHINE SULFATE 2 MG: 4 INJECTION INTRAVENOUS at 14:48

## 2020-01-01 RX ADMIN — RIVAROXABAN 2.5 MG: 2.5 TABLET, FILM COATED ORAL at 05:39

## 2020-01-01 RX ADMIN — SODIUM CHLORIDE: 9 INJECTION, SOLUTION INTRAVENOUS at 21:16

## 2020-01-01 RX ADMIN — CEFTRIAXONE SODIUM 1 G: 1 INJECTION, POWDER, FOR SOLUTION INTRAMUSCULAR; INTRAVENOUS at 05:01

## 2020-01-01 RX ADMIN — SODIUM CHLORIDE: 9 INJECTION, SOLUTION INTRAVENOUS at 21:04

## 2020-01-01 RX ADMIN — MORPHINE SULFATE 2 MG: 4 INJECTION INTRAVENOUS at 22:45

## 2020-01-01 RX ADMIN — DRONABINOL 2.5 MG: 2.5 CAPSULE ORAL at 17:51

## 2020-01-01 RX ADMIN — DRONABINOL 2.5 MG: 2.5 CAPSULE ORAL at 17:04

## 2020-01-01 RX ADMIN — ACETAMINOPHEN 650 MG: 325 TABLET, FILM COATED ORAL at 13:09

## 2020-01-01 RX ADMIN — LABETALOL HYDROCHLORIDE 10 MG: 5 INJECTION INTRAVENOUS at 21:25

## 2020-01-01 RX ADMIN — METOPROLOL SUCCINATE 50 MG: 50 TABLET, EXTENDED RELEASE ORAL at 05:43

## 2020-01-01 RX ADMIN — SODIUM CHLORIDE: 9 INJECTION, SOLUTION INTRAVENOUS at 14:08

## 2020-01-01 RX ADMIN — FENTANYL CITRATE 25 MCG: 50 INJECTION INTRAMUSCULAR; INTRAVENOUS at 11:02

## 2020-01-01 RX ADMIN — GABAPENTIN 300 MG: 300 CAPSULE ORAL at 04:27

## 2020-01-01 RX ADMIN — KETOROLAC TROMETHAMINE 15 MG: 30 INJECTION, SOLUTION INTRAMUSCULAR at 11:27

## 2020-01-01 RX ADMIN — RIVAROXABAN 2.5 MG: 2.5 TABLET, FILM COATED ORAL at 17:50

## 2020-01-01 RX ADMIN — OXYCODONE 5 MG: 5 TABLET ORAL at 20:04

## 2020-01-01 RX ADMIN — HEPARIN SODIUM 500 UNITS/HR: 5000 INJECTION, SOLUTION INTRAVENOUS at 19:45

## 2020-01-01 RX ADMIN — GABAPENTIN 300 MG: 300 CAPSULE ORAL at 12:01

## 2020-01-01 RX ADMIN — KETOROLAC TROMETHAMINE 15 MG: 30 INJECTION, SOLUTION INTRAMUSCULAR at 00:17

## 2020-01-01 RX ADMIN — DOCUSATE SODIUM 50 MG AND SENNOSIDES 8.6 MG 2 TABLET: 8.6; 5 TABLET, FILM COATED ORAL at 05:26

## 2020-01-01 RX ADMIN — METOPROLOL SUCCINATE 50 MG: 50 TABLET, EXTENDED RELEASE ORAL at 05:05

## 2020-01-01 RX ADMIN — CLOPIDOGREL BISULFATE 150 MG: 75 TABLET ORAL at 12:31

## 2020-01-01 RX ADMIN — DOCUSATE SODIUM 50 MG AND SENNOSIDES 8.6 MG 2 TABLET: 8.6; 5 TABLET, FILM COATED ORAL at 17:34

## 2020-01-01 RX ADMIN — FUROSEMIDE 20 MG: 10 INJECTION, SOLUTION INTRAMUSCULAR; INTRAVENOUS at 09:53

## 2020-01-01 RX ADMIN — FERROUS SULFATE TAB 325 MG (65 MG ELEMENTAL FE) 325 MG: 325 (65 FE) TAB at 08:45

## 2020-01-01 RX ADMIN — Medication: at 16:04

## 2020-01-01 RX ADMIN — ASPIRIN 81 MG: 81 TABLET, COATED ORAL at 09:00

## 2020-01-01 RX ADMIN — METOPROLOL SUCCINATE 25 MG: 25 TABLET, EXTENDED RELEASE ORAL at 06:31

## 2020-01-01 RX ADMIN — ONDANSETRON 4 MG: 2 INJECTION INTRAMUSCULAR; INTRAVENOUS at 08:20

## 2020-01-01 RX ADMIN — ACETAMINOPHEN 650 MG: 325 TABLET, FILM COATED ORAL at 17:50

## 2020-01-01 RX ADMIN — SODIUM CHLORIDE: 9 INJECTION, SOLUTION INTRAVENOUS at 21:15

## 2020-01-01 RX ADMIN — FENTANYL CITRATE 25 MCG: 50 INJECTION INTRAMUSCULAR; INTRAVENOUS at 08:39

## 2020-01-01 RX ADMIN — AZITHROMYCIN MONOHYDRATE 500 MG: 250 TABLET ORAL at 05:00

## 2020-01-01 RX ADMIN — SODIUM CHLORIDE: 9 INJECTION, SOLUTION INTRAVENOUS at 23:54

## 2020-01-01 RX ADMIN — ACETAMINOPHEN 650 MG: 325 TABLET, FILM COATED ORAL at 05:44

## 2020-01-01 RX ADMIN — ONDANSETRON 4 MG: 2 INJECTION INTRAMUSCULAR; INTRAVENOUS at 17:51

## 2020-01-01 RX ADMIN — TAMSULOSIN HYDROCHLORIDE 0.4 MG: 0.4 CAPSULE ORAL at 09:20

## 2020-01-01 RX ADMIN — ALTEPLASE 1 MG/HR: KIT at 10:15

## 2020-01-01 RX ADMIN — GABAPENTIN 300 MG: 300 CAPSULE ORAL at 17:04

## 2020-01-01 RX ADMIN — POLYETHYLENE GLYCOL 3350 1 PACKET: 17 POWDER, FOR SOLUTION ORAL at 11:27

## 2020-01-01 RX ADMIN — PROTAMINE SULFATE 20 MG: 10 INJECTION, SOLUTION INTRAVENOUS at 18:41

## 2020-01-01 RX ADMIN — LOSARTAN POTASSIUM 25 MG: 25 TABLET, FILM COATED ORAL at 04:54

## 2020-01-01 RX ADMIN — DEXTROSE MONOHYDRATE: 100 INJECTION, SOLUTION INTRAVENOUS at 22:47

## 2020-01-01 RX ADMIN — HEPARIN SODIUM 4000 UNITS: 1000 INJECTION, SOLUTION INTRAVENOUS; SUBCUTANEOUS at 18:14

## 2020-01-01 RX ADMIN — RIVAROXABAN 2.5 MG: 2.5 TABLET, FILM COATED ORAL at 17:58

## 2020-01-01 RX ADMIN — CLOPIDOGREL BISULFATE 75 MG: 75 TABLET ORAL at 05:01

## 2020-01-01 RX ADMIN — ASPIRIN 325 MG: 325 TABLET, FILM COATED ORAL at 03:08

## 2020-01-01 RX ADMIN — ASPIRIN 81 MG: 81 TABLET, COATED ORAL at 06:30

## 2020-01-01 RX ADMIN — OXYCODONE 5 MG: 5 TABLET ORAL at 03:25

## 2020-01-01 RX ADMIN — HEPARIN SODIUM 950 UNITS/HR: 5000 INJECTION, SOLUTION INTRAVENOUS at 03:55

## 2020-01-01 RX ADMIN — SODIUM CHLORIDE: 9 INJECTION, SOLUTION INTRAVENOUS at 05:30

## 2020-01-01 RX ADMIN — CLOPIDOGREL BISULFATE 75 MG: 75 TABLET ORAL at 06:31

## 2020-01-01 RX ADMIN — INSULIN HUMAN 2 UNITS: 100 INJECTION, SOLUTION PARENTERAL at 13:51

## 2020-01-01 RX ADMIN — DRONABINOL 2.5 MG: 2.5 CAPSULE ORAL at 16:46

## 2020-01-01 RX ADMIN — MORPHINE SULFATE 2 MG: 4 INJECTION INTRAVENOUS at 05:12

## 2020-01-01 RX ADMIN — LOSARTAN POTASSIUM 25 MG: 25 TABLET, FILM COATED ORAL at 11:27

## 2020-01-01 RX ADMIN — ALTEPLASE 1 MG/HR: KIT at 19:45

## 2020-01-01 RX ADMIN — GABAPENTIN 300 MG: 300 CAPSULE ORAL at 17:46

## 2020-01-01 RX ADMIN — METOPROLOL SUCCINATE 25 MG: 25 TABLET, EXTENDED RELEASE ORAL at 08:17

## 2020-01-01 RX ADMIN — HEPARIN SODIUM 1050 UNITS/HR: 5000 INJECTION, SOLUTION INTRAVENOUS at 21:22

## 2020-01-01 RX ADMIN — PROPOFOL 100 MCG/KG/MIN: 10 INJECTION, EMULSION INTRAVENOUS at 12:46

## 2020-01-01 RX ADMIN — ONDANSETRON 4 MG: 2 INJECTION INTRAMUSCULAR; INTRAVENOUS at 08:05

## 2020-01-01 RX ADMIN — LABETALOL HYDROCHLORIDE 10 MG: 5 INJECTION INTRAVENOUS at 03:28

## 2020-01-01 RX ADMIN — ACETAMINOPHEN 650 MG: 325 TABLET, FILM COATED ORAL at 11:27

## 2020-01-01 RX ADMIN — METOPROLOL SUCCINATE 25 MG: 25 TABLET, EXTENDED RELEASE ORAL at 09:53

## 2020-01-01 RX ADMIN — GABAPENTIN 300 MG: 300 CAPSULE ORAL at 04:54

## 2020-01-01 RX ADMIN — FENTANYL CITRATE 50 MCG: 50 INJECTION INTRAMUSCULAR; INTRAVENOUS at 18:57

## 2020-01-01 RX ADMIN — FENTANYL CITRATE 25 MCG: 50 INJECTION INTRAMUSCULAR; INTRAVENOUS at 23:54

## 2020-01-01 RX ADMIN — MIDAZOLAM HYDROCHLORIDE 1 MG: 1 INJECTION, SOLUTION INTRAMUSCULAR; INTRAVENOUS at 18:58

## 2020-01-01 RX ADMIN — POTASSIUM CHLORIDE 40 MEQ: 1500 TABLET, EXTENDED RELEASE ORAL at 15:45

## 2020-01-01 RX ADMIN — DRONABINOL 2.5 MG: 2.5 CAPSULE ORAL at 12:48

## 2020-01-01 RX ADMIN — TAMSULOSIN HYDROCHLORIDE 0.4 MG: 0.4 CAPSULE ORAL at 08:30

## 2020-01-01 RX ADMIN — MORPHINE SULFATE 4 MG: 4 INJECTION INTRAVENOUS at 20:35

## 2020-01-01 RX ADMIN — OXYCODONE HYDROCHLORIDE 10 MG: 10 TABLET ORAL at 23:06

## 2020-01-01 RX ADMIN — PROPOFOL 40 MG: 10 INJECTION, EMULSION INTRAVENOUS at 12:46

## 2020-01-01 RX ADMIN — HEPARIN SODIUM 500 UNITS/HR: 5000 INJECTION, SOLUTION INTRAVENOUS at 10:16

## 2020-01-01 RX ADMIN — DOCUSATE SODIUM 50 MG AND SENNOSIDES 8.6 MG 2 TABLET: 8.6; 5 TABLET, FILM COATED ORAL at 05:43

## 2020-01-01 RX ADMIN — LOSARTAN POTASSIUM 25 MG: 25 TABLET, FILM COATED ORAL at 05:13

## 2020-01-01 RX ADMIN — CLOPIDOGREL BISULFATE 75 MG: 75 TABLET ORAL at 05:13

## 2020-01-01 RX ADMIN — LIDOCAINE HYDROCHLORIDE 100 MG: 20 INJECTION, SOLUTION EPIDURAL; INFILTRATION; INTRACAUDAL at 12:46

## 2020-01-01 RX ADMIN — GABAPENTIN 300 MG: 300 CAPSULE ORAL at 12:39

## 2020-01-01 RX ADMIN — ATORVASTATIN CALCIUM 40 MG: 40 TABLET, FILM COATED ORAL at 17:50

## 2020-01-01 RX ADMIN — FUROSEMIDE 40 MG: 10 INJECTION, SOLUTION INTRAMUSCULAR; INTRAVENOUS at 04:53

## 2020-01-01 RX ADMIN — IOHEXOL 45 ML: 350 INJECTION, SOLUTION INTRAVENOUS at 05:41

## 2020-01-01 RX ADMIN — ACETAMINOPHEN 650 MG: 325 TABLET, FILM COATED ORAL at 00:17

## 2020-01-01 RX ADMIN — INSULIN HUMAN 2 UNITS: 100 INJECTION, SOLUTION PARENTERAL at 08:25

## 2020-01-01 RX ADMIN — ATORVASTATIN CALCIUM 40 MG: 40 TABLET, FILM COATED ORAL at 17:46

## 2020-01-01 RX ADMIN — METOPROLOL SUCCINATE 25 MG: 25 TABLET, EXTENDED RELEASE ORAL at 05:13

## 2020-01-01 RX ADMIN — TAMSULOSIN HYDROCHLORIDE 0.4 MG: 0.4 CAPSULE ORAL at 08:16

## 2020-01-01 RX ADMIN — GABAPENTIN 300 MG: 300 CAPSULE ORAL at 05:44

## 2020-01-01 RX ADMIN — OXYCODONE HYDROCHLORIDE 5 MG: 5 SOLUTION ORAL at 11:59

## 2020-01-01 RX ADMIN — LOSARTAN POTASSIUM 25 MG: 25 TABLET, FILM COATED ORAL at 06:04

## 2020-01-01 RX ADMIN — SODIUM CHLORIDE: 9 INJECTION, SOLUTION INTRAVENOUS at 12:45

## 2020-01-01 RX ADMIN — OMEPRAZOLE 20 MG: 20 CAPSULE, DELAYED RELEASE ORAL at 06:05

## 2020-01-01 RX ADMIN — METOPROLOL SUCCINATE 50 MG: 50 TABLET, EXTENDED RELEASE ORAL at 04:23

## 2020-01-01 RX ADMIN — DRONABINOL 2.5 MG: 2.5 CAPSULE ORAL at 17:46

## 2020-01-01 RX ADMIN — METOPROLOL SUCCINATE 50 MG: 50 TABLET, EXTENDED RELEASE ORAL at 05:03

## 2020-01-01 RX ADMIN — OMEPRAZOLE 20 MG: 20 CAPSULE, DELAYED RELEASE ORAL at 04:54

## 2020-01-01 RX ADMIN — ATORVASTATIN CALCIUM 40 MG: 40 TABLET, FILM COATED ORAL at 18:26

## 2020-01-01 RX ADMIN — SODIUM CHLORIDE: 9 INJECTION, SOLUTION INTRAVENOUS at 04:34

## 2020-01-01 RX ADMIN — INSULIN HUMAN 2 UNITS: 100 INJECTION, SOLUTION PARENTERAL at 06:10

## 2020-01-01 RX ADMIN — LOSARTAN POTASSIUM 25 MG: 25 TABLET, FILM COATED ORAL at 05:01

## 2020-01-01 RX ADMIN — DOCUSATE SODIUM 50 MG AND SENNOSIDES 8.6 MG 2 TABLET: 8.6; 5 TABLET, FILM COATED ORAL at 17:04

## 2020-01-01 RX ADMIN — MIDAZOLAM HYDROCHLORIDE 1 MG: 1 INJECTION, SOLUTION INTRAMUSCULAR; INTRAVENOUS at 18:35

## 2020-01-01 RX ADMIN — GABAPENTIN 300 MG: 300 CAPSULE ORAL at 11:27

## 2020-01-01 RX ADMIN — OXYCODONE HYDROCHLORIDE 10 MG: 10 TABLET ORAL at 17:07

## 2020-01-01 RX ADMIN — TAMSULOSIN HYDROCHLORIDE 0.4 MG: 0.4 CAPSULE ORAL at 08:13

## 2020-01-01 RX ADMIN — ONDANSETRON 4 MG: 2 INJECTION INTRAMUSCULAR; INTRAVENOUS at 03:16

## 2020-01-01 RX ADMIN — GABAPENTIN 300 MG: 300 CAPSULE ORAL at 17:50

## 2020-01-01 RX ADMIN — METOPROLOL SUCCINATE 50 MG: 50 TABLET, EXTENDED RELEASE ORAL at 04:54

## 2020-01-01 RX ADMIN — FENTANYL CITRATE 50 MCG: 50 INJECTION INTRAMUSCULAR; INTRAVENOUS at 17:40

## 2020-01-01 RX ADMIN — GABAPENTIN 300 MG: 300 CAPSULE ORAL at 12:23

## 2020-01-01 RX ADMIN — DOCUSATE SODIUM 50 MG AND SENNOSIDES 8.6 MG 2 TABLET: 8.6; 5 TABLET, FILM COATED ORAL at 19:09

## 2020-01-01 RX ADMIN — CLOPIDOGREL BISULFATE 75 MG: 75 TABLET ORAL at 04:15

## 2020-01-01 RX ADMIN — DRONABINOL 2.5 MG: 2.5 CAPSULE ORAL at 13:07

## 2020-01-01 RX ADMIN — LOSARTAN POTASSIUM 25 MG: 25 TABLET, FILM COATED ORAL at 08:17

## 2020-01-01 RX ADMIN — HEPARIN SODIUM 6000 UNITS: 1000 INJECTION, SOLUTION INTRAVENOUS; SUBCUTANEOUS at 10:54

## 2020-01-01 RX ADMIN — FENTANYL CITRATE 50 MCG: 50 INJECTION INTRAMUSCULAR; INTRAVENOUS at 19:00

## 2020-01-01 RX ADMIN — TAMSULOSIN HYDROCHLORIDE 0.4 MG: 0.4 CAPSULE ORAL at 09:33

## 2020-01-01 RX ADMIN — RIVAROXABAN 2.5 MG: 2.5 TABLET, FILM COATED ORAL at 08:18

## 2020-01-01 RX ADMIN — FUROSEMIDE 40 MG: 10 INJECTION, SOLUTION INTRAMUSCULAR; INTRAVENOUS at 18:10

## 2020-01-01 RX ADMIN — TAMSULOSIN HYDROCHLORIDE 0.4 MG: 0.4 CAPSULE ORAL at 09:27

## 2020-01-01 RX ADMIN — ACETAMINOPHEN 650 MG: 325 TABLET, FILM COATED ORAL at 17:35

## 2020-01-01 RX ADMIN — CLOPIDOGREL BISULFATE 75 MG: 75 TABLET ORAL at 08:18

## 2020-01-01 RX ADMIN — DRONABINOL 2.5 MG: 2.5 CAPSULE ORAL at 00:14

## 2020-01-01 RX ADMIN — ASPIRIN 81 MG: 81 TABLET, COATED ORAL at 05:13

## 2020-01-01 RX ADMIN — METOPROLOL SUCCINATE 50 MG: 50 TABLET, EXTENDED RELEASE ORAL at 04:30

## 2020-01-01 RX ADMIN — OXYCODONE HYDROCHLORIDE 10 MG: 10 TABLET ORAL at 03:45

## 2020-01-01 RX ADMIN — MORPHINE SULFATE 2 MG: 4 INJECTION INTRAVENOUS at 02:09

## 2020-01-01 RX ADMIN — SODIUM CHLORIDE: 9 INJECTION, SOLUTION INTRAVENOUS at 05:05

## 2020-01-01 RX ADMIN — GABAPENTIN 300 MG: 300 CAPSULE ORAL at 06:04

## 2020-01-01 RX ADMIN — SODIUM CHLORIDE: 9 INJECTION, SOLUTION INTRAVENOUS at 03:31

## 2020-01-01 RX ADMIN — TAMSULOSIN HYDROCHLORIDE 0.4 MG: 0.4 CAPSULE ORAL at 08:11

## 2020-01-01 RX ADMIN — ASPIRIN 81 MG: 81 TABLET, COATED ORAL at 05:00

## 2020-01-01 RX ADMIN — GABAPENTIN 400 MG: 400 CAPSULE ORAL at 17:03

## 2020-01-01 RX ADMIN — ATORVASTATIN CALCIUM 40 MG: 40 TABLET, FILM COATED ORAL at 17:34

## 2020-01-01 RX ADMIN — FERROUS SULFATE TAB 325 MG (65 MG ELEMENTAL FE) 325 MG: 325 (65 FE) TAB at 18:05

## 2020-01-01 RX ADMIN — OXYCODONE HYDROCHLORIDE 10 MG: 10 TABLET ORAL at 03:35

## 2020-01-01 RX ADMIN — CLONIDINE HYDROCHLORIDE 0.1 MG: 0.1 TABLET ORAL at 23:26

## 2020-01-01 RX ADMIN — ONDANSETRON 4 MG: 2 INJECTION INTRAMUSCULAR; INTRAVENOUS at 21:24

## 2020-01-01 RX ADMIN — OXYCODONE HYDROCHLORIDE 10 MG: 10 TABLET ORAL at 17:03

## 2020-01-01 RX ADMIN — HEPARIN SODIUM 5000 UNITS: 1000 INJECTION, SOLUTION INTRAVENOUS; SUBCUTANEOUS at 02:58

## 2020-01-01 RX ADMIN — GABAPENTIN 300 MG: 300 CAPSULE ORAL at 18:09

## 2020-01-01 RX ADMIN — SODIUM CHLORIDE: 9 INJECTION, SOLUTION INTRAVENOUS at 02:41

## 2020-01-01 RX ADMIN — FENTANYL CITRATE 100 MCG: 50 INJECTION, SOLUTION INTRAMUSCULAR; INTRAVENOUS at 10:46

## 2020-01-01 RX ADMIN — HEPARIN SODIUM 950 UNITS/HR: 5000 INJECTION, SOLUTION INTRAVENOUS at 06:16

## 2020-01-01 RX ADMIN — GABAPENTIN 300 MG: 300 CAPSULE ORAL at 17:44

## 2020-01-01 RX ADMIN — LABETALOL HYDROCHLORIDE 5 MG: 5 INJECTION INTRAVENOUS at 12:43

## 2020-01-01 RX ADMIN — GABAPENTIN 300 MG: 300 CAPSULE ORAL at 05:00

## 2020-01-01 RX ADMIN — GABAPENTIN 300 MG: 300 CAPSULE ORAL at 05:26

## 2020-01-01 RX ADMIN — MORPHINE SULFATE 4 MG: 4 INJECTION INTRAVENOUS at 22:30

## 2020-01-01 RX ADMIN — CLONIDINE HYDROCHLORIDE 0.2 MG: 0.1 TABLET ORAL at 19:48

## 2020-01-01 RX ADMIN — GABAPENTIN 300 MG: 300 CAPSULE ORAL at 05:39

## 2020-01-01 RX ADMIN — OMEPRAZOLE 20 MG: 20 CAPSULE, DELAYED RELEASE ORAL at 05:00

## 2020-01-01 RX ADMIN — ONDANSETRON 4 MG: 2 INJECTION INTRAMUSCULAR; INTRAVENOUS at 22:33

## 2020-01-01 RX ADMIN — GABAPENTIN 300 MG: 300 CAPSULE ORAL at 17:53

## 2020-01-01 RX ADMIN — IODIXANOL 20 ML: 270 INJECTION, SOLUTION INTRAVASCULAR at 19:30

## 2020-01-01 RX ADMIN — OXYCODONE HYDROCHLORIDE 10 MG: 10 TABLET ORAL at 21:14

## 2020-01-01 RX ADMIN — GABAPENTIN 300 MG: 300 CAPSULE ORAL at 12:38

## 2020-01-01 RX ADMIN — FUROSEMIDE 40 MG: 10 INJECTION, SOLUTION INTRAMUSCULAR; INTRAVENOUS at 16:46

## 2020-01-01 RX ADMIN — GABAPENTIN 300 MG: 300 CAPSULE ORAL at 17:34

## 2020-01-01 RX ADMIN — FENTANYL CITRATE 50 MCG: 50 INJECTION INTRAMUSCULAR; INTRAVENOUS at 18:35

## 2020-01-01 RX ADMIN — OXYCODONE HYDROCHLORIDE 10 MG: 10 TABLET ORAL at 12:54

## 2020-01-01 RX ADMIN — ACETAMINOPHEN 650 MG: 325 TABLET, FILM COATED ORAL at 13:00

## 2020-01-01 RX ADMIN — FENTANYL CITRATE 100 MCG: 50 INJECTION, SOLUTION INTRAMUSCULAR; INTRAVENOUS at 11:06

## 2020-01-01 RX ADMIN — OMEPRAZOLE 20 MG: 20 CAPSULE, DELAYED RELEASE ORAL at 04:15

## 2020-01-01 RX ADMIN — ATORVASTATIN CALCIUM 40 MG: 40 TABLET, FILM COATED ORAL at 17:44

## 2020-01-01 RX ADMIN — HYDRALAZINE HYDROCHLORIDE 10 MG: 20 INJECTION INTRAMUSCULAR; INTRAVENOUS at 00:04

## 2020-01-01 RX ADMIN — ACETAMINOPHEN 650 MG: 325 TABLET, FILM COATED ORAL at 05:39

## 2020-01-01 RX ADMIN — MORPHINE SULFATE 2 MG: 4 INJECTION INTRAVENOUS at 08:54

## 2020-01-01 RX ADMIN — RIVAROXABAN 2.5 MG: 2.5 TABLET, FILM COATED ORAL at 11:27

## 2020-01-01 RX ADMIN — ACETAMINOPHEN 650 MG: 325 TABLET, FILM COATED ORAL at 08:15

## 2020-01-01 RX ADMIN — FENTANYL CITRATE 25 MCG: 50 INJECTION INTRAMUSCULAR; INTRAVENOUS at 03:25

## 2020-01-01 RX ADMIN — GABAPENTIN 300 MG: 300 CAPSULE ORAL at 08:16

## 2020-01-01 RX ADMIN — TAMSULOSIN HYDROCHLORIDE 0.4 MG: 0.4 CAPSULE ORAL at 09:23

## 2020-01-01 RX ADMIN — ASPIRIN 81 MG: 81 TABLET, COATED ORAL at 08:14

## 2020-01-01 RX ADMIN — OXYCODONE HYDROCHLORIDE 10 MG: 10 TABLET ORAL at 10:45

## 2020-01-01 RX ADMIN — GABAPENTIN 300 MG: 300 CAPSULE ORAL at 11:58

## 2020-01-01 RX ADMIN — DOCUSATE SODIUM 50 MG AND SENNOSIDES 8.6 MG 2 TABLET: 8.6; 5 TABLET, FILM COATED ORAL at 05:00

## 2020-01-01 RX ADMIN — ALTEPLASE 1 MG/HR: KIT at 23:28

## 2020-01-01 RX ADMIN — METOPROLOL SUCCINATE 50 MG: 50 TABLET, EXTENDED RELEASE ORAL at 05:26

## 2020-01-01 RX ADMIN — FERROUS SULFATE TAB 325 MG (65 MG ELEMENTAL FE) 325 MG: 325 (65 FE) TAB at 16:46

## 2020-01-01 RX ADMIN — FENTANYL CITRATE 50 MCG: 50 INJECTION INTRAMUSCULAR; INTRAVENOUS at 12:44

## 2020-01-01 RX ADMIN — GABAPENTIN 300 MG: 300 CAPSULE ORAL at 17:03

## 2020-01-01 RX ADMIN — CEFAZOLIN 2 G: 330 INJECTION, POWDER, FOR SOLUTION INTRAMUSCULAR; INTRAVENOUS at 10:17

## 2020-01-01 RX ADMIN — LOSARTAN POTASSIUM 25 MG: 25 TABLET, FILM COATED ORAL at 04:23

## 2020-01-01 RX ADMIN — SODIUM CHLORIDE, POTASSIUM CHLORIDE, SODIUM LACTATE AND CALCIUM CHLORIDE: 600; 310; 30; 20 INJECTION, SOLUTION INTRAVENOUS at 12:01

## 2020-01-01 RX ADMIN — LOSARTAN POTASSIUM 25 MG: 25 TABLET, FILM COATED ORAL at 05:39

## 2020-01-01 RX ADMIN — FUROSEMIDE 20 MG: 10 INJECTION, SOLUTION INTRAMUSCULAR; INTRAVENOUS at 05:00

## 2020-01-01 RX ADMIN — HEPARIN SODIUM 1050 UNITS/HR: 5000 INJECTION, SOLUTION INTRAVENOUS at 02:58

## 2020-01-01 RX ADMIN — SODIUM CHLORIDE 500 ML: 9 INJECTION, SOLUTION INTRAVENOUS at 20:29

## 2020-01-01 RX ADMIN — METOPROLOL SUCCINATE 50 MG: 50 TABLET, EXTENDED RELEASE ORAL at 05:01

## 2020-01-01 RX ADMIN — ATORVASTATIN CALCIUM 40 MG: 40 TABLET, FILM COATED ORAL at 17:04

## 2020-01-01 RX ADMIN — GABAPENTIN 300 MG: 300 CAPSULE ORAL at 13:09

## 2020-01-01 RX ADMIN — TAMSULOSIN HYDROCHLORIDE 0.4 MG: 0.4 CAPSULE ORAL at 09:53

## 2020-01-01 RX ADMIN — KETOROLAC TROMETHAMINE 15 MG: 30 INJECTION, SOLUTION INTRAMUSCULAR at 18:29

## 2020-01-01 RX ADMIN — ONDANSETRON 4 MG: 2 INJECTION INTRAMUSCULAR; INTRAVENOUS at 16:21

## 2020-01-01 RX ADMIN — ONDANSETRON 4 MG: 2 INJECTION INTRAMUSCULAR; INTRAVENOUS at 06:37

## 2020-01-01 RX ADMIN — DRONABINOL 2.5 MG: 2.5 CAPSULE ORAL at 11:52

## 2020-01-01 RX ADMIN — GABAPENTIN 300 MG: 300 CAPSULE ORAL at 12:18

## 2020-01-01 RX ADMIN — ACETAMINOPHEN 650 MG: 325 TABLET, FILM COATED ORAL at 23:32

## 2020-01-01 RX ADMIN — LOSARTAN POTASSIUM 25 MG: 25 TABLET, FILM COATED ORAL at 06:30

## 2020-01-01 RX ADMIN — OMEPRAZOLE 20 MG: 20 CAPSULE, DELAYED RELEASE ORAL at 05:01

## 2020-01-01 RX ADMIN — FENTANYL CITRATE 25 MCG: 50 INJECTION INTRAMUSCULAR; INTRAVENOUS at 13:53

## 2020-01-01 RX ADMIN — METOPROLOL SUCCINATE 50 MG: 50 TABLET, EXTENDED RELEASE ORAL at 05:12

## 2020-01-01 RX ADMIN — ASPIRIN 81 MG: 81 TABLET, COATED ORAL at 04:15

## 2020-01-01 RX ADMIN — GABAPENTIN 400 MG: 400 CAPSULE ORAL at 05:03

## 2020-01-01 RX ADMIN — ATORVASTATIN CALCIUM 40 MG: 40 TABLET, FILM COATED ORAL at 18:05

## 2020-01-01 RX ADMIN — AZITHROMYCIN MONOHYDRATE 500 MG: 250 TABLET ORAL at 08:22

## 2020-01-01 RX ADMIN — ASPIRIN 81 MG: 81 TABLET, COATED ORAL at 08:16

## 2020-01-01 RX ADMIN — FENTANYL CITRATE 25 MCG: 50 INJECTION INTRAMUSCULAR; INTRAVENOUS at 05:31

## 2020-01-01 RX ADMIN — SODIUM CHLORIDE: 9 INJECTION, SOLUTION INTRAVENOUS at 17:05

## 2020-01-01 RX ADMIN — Medication 5 MG: at 11:59

## 2020-01-01 RX ADMIN — FENTANYL CITRATE 25 MCG: 50 INJECTION INTRAMUSCULAR; INTRAVENOUS at 16:21

## 2020-01-01 RX ADMIN — ALPRAZOLAM 0.25 MG: 0.25 TABLET ORAL at 23:56

## 2020-01-01 RX ADMIN — KETOROLAC TROMETHAMINE 15 MG: 30 INJECTION, SOLUTION INTRAMUSCULAR at 05:32

## 2020-01-01 RX ADMIN — ACETAMINOPHEN 650 MG: 325 TABLET, FILM COATED ORAL at 11:58

## 2020-01-01 RX ADMIN — KETOROLAC TROMETHAMINE 15 MG: 30 INJECTION, SOLUTION INTRAMUSCULAR at 19:10

## 2020-01-01 RX ADMIN — ACETAMINOPHEN 650 MG: 325 TABLET, FILM COATED ORAL at 05:12

## 2020-01-01 SDOH — ECONOMIC STABILITY: HOUSING INSECURITY
HOME SAFETY: USEKEEPING, LAUNDRY AND FOOD SHOPPING. INST ON IMPORTANCE OF KEEPING SURROUNDINGS CLEAN TO PREVENT PESTS AND INFECTION.  ALSO INST. TO OBTAIN PHONE NUMBER OF FRONT DESK IN CASE HE NEEDS TO COMMUNICATE WITH HOME HEALTH IN AN EMERGENCY. STATES KEEPS TO

## 2020-01-01 SDOH — HEALTH STABILITY: MENTAL HEALTH: HOW OFTEN DO YOU HAVE A DRINK CONTAINING ALCOHOL?: NEVER

## 2020-01-01 SDOH — ECONOMIC STABILITY: HOUSING INSECURITY
HOME SAFETY: PT IS TRYING TO MOVE INTO A GROUP HOME AND BE OUT OF HIGH CRIME AREA, BUT HE IS STILL WAITING FOR APPROVAL. PT DOES HAVE SOME CLUTTER, BUT ONLY AMBULATES WITH WHEELCHAIR OR ELECTRIC CHAIR AND IS NOT SUSCEPTIBLE TO TRIPPING AT THIS TIME.

## 2020-01-01 SDOH — ECONOMIC STABILITY: HOUSING INSECURITY: HOME SAFETY: HIMSELF IN ROOM SO AS NOT TO BE EXPOSED TO CRIME IN AREA OR INFECTION.

## 2020-01-01 SDOH — ECONOMIC STABILITY: HOUSING INSECURITY
HOME SAFETY: LIVES IN A RESIDENTIAL HOTEL, SMALL STUDIO WITH A FULL SIZE BED, DRESSER AND SIDE TABLE.  ROOM IS NOT ADAPTED TO CHAIRBOUND CLIENTS SO SINK IS FULL OF UNWASHED DISHES, CLUTTER ON THE FLOOR. STATES HAS PERSON FROM STATE WHO COMES 12HRS PER WEEK FOR HO

## 2020-01-01 ASSESSMENT — FIBROSIS 4 INDEX
FIB4 SCORE: 2.05
FIB4 SCORE: 1.37
FIB4 SCORE: 2.51
FIB4 SCORE: 0.88
FIB4 SCORE: 2.17
FIB4 SCORE: 0.88
FIB4 SCORE: 2.98
FIB4 SCORE: 2.11

## 2020-01-01 ASSESSMENT — ENCOUNTER SYMPTOMS
NERVOUS/ANXIOUS: 0
SEIZURES: 0
NAUSEA: DENIES
ABDOMINAL PAIN: 0
RESPIRATORY NEGATIVE: 1
DIZZINESS: 0
EYE PAIN: 0
DIZZINESS: 0
NAUSEA: 0
EYES NEGATIVE: 1
NERVOUS/ANXIOUS: 0
DOUBLE VISION: 0
PALPITATIONS: 0
MUSCLE WEAKNESS: 1
BRUISES/BLEEDS EASILY: 1
MYALGIAS: 1
DEBILITATING PAIN: 1
ABDOMINAL PAIN: 0
BLURRED VISION: 0
COUGH: 0
FEVER: 0
MUSCLE WEAKNESS: 1
NAUSEA: 0
VOMITING: 0
CONSTITUTIONAL NEGATIVE: 1
SHORTNESS OF BREATH: 0
EYE DISCHARGE: 0
VOMITING: DENIES
SORE THROAT: 0
COUGH: 0
SEIZURES: 0
FEVER: 0
SPUTUM PRODUCTION: 0
NAUSEA: DENIES
CHILLS: 0
NAUSEA: DENIES
NAUSEA: DENIES
SHORTNESS OF BREATH: T
SHORTNESS OF BREATH: T
EYE PAIN: 0
PALPITATIONS: 0
VOMITING: 0
ORTHOPNEA: 0
EYE PAIN: 0
NECK PAIN: 0
SEIZURES: 0
EYE PAIN: 0
DEPRESSION: 0
STRIDOR: 0
NECK PAIN: 0
COUGH: 0
CHILLS: 0
SHORTNESS OF BREATH: 1
EYE DISCHARGE: 0
FOCAL WEAKNESS: 0
FALLS: 0
NECK PAIN: 0
CHILLS: 0
ABDOMINAL PAIN: 0
HEADACHES: 0
SEIZURES: 0
ABDOMINAL PAIN: 0
EYE DISCHARGE: 0
VOMITING: 0
CARDIOVASCULAR NEGATIVE: 1
BACK PAIN: 0
NAUSEA: 0
COUGH: 0
PALPITATIONS: 0
MYALGIAS: 1
PALPITATIONS: 0
MUSCLE WEAKNESS: 1
EYE PAIN: 0
FOCAL WEAKNESS: 0
DEPRESSION: 0
PALPITATIONS: 0
SORE THROAT: 0
HEMOPTYSIS: 0
CLAUDICATION: 1
VOMITING: DENIES
COUGH: 0
INSOMNIA: 0
ORTHOPNEA: 0
FOCAL WEAKNESS: 0
SHORTNESS OF BREATH: T
FEVER: 0
VOMITING: DENIES
CONSTIPATION: 0
NAUSEA: 0
BLURRED VISION: 0
VOMITING: DENIES
COUGH: 0
HEADACHES: 0
PHOTOPHOBIA: 0
VOMITING: 0
MYALGIAS: 1
ORTHOPNEA: 0
HEARTBURN: 0
CHILLS: 0
FOCAL WEAKNESS: 0
DIZZINESS: 0
INSOMNIA: 0
CHILLS: 0
THOUGHT CONTENT - SUSPICIOUS: 1
NECK PAIN: 0
NECK PAIN: 0
BACK PAIN: 0
MUSCLE WEAKNESS: 1
BLURRED VISION: 0
VOMITING: 0
STRIDOR: 0
NAUSEA: DENIES
PHOTOPHOBIA: 0
PALPITATIONS: 0
NAUSEA: 0
AGORAPHOBIA: 1
FEVER: 0
VOMITING: DENIES
VOMITING: DENIES
EYES NEGATIVE: 1
FEVER: 0
SHORTNESS OF BREATH: T
VOMITING: DENIES
VOMITING: DENIES
MYALGIAS: 1
FEVER: 0
NERVOUS/ANXIOUS: 0
PSYCHIATRIC NEGATIVE: 1
FOCAL WEAKNESS: 0
EYES NEGATIVE: 1
NERVOUS/ANXIOUS: 0
SHORTNESS OF BREATH: 1
FEVER: 0
DIARRHEA: 0
ABDOMINAL PAIN: 0
NECK PAIN: 0
BLURRED VISION: 0
CHILLS: 0
PSYCHIATRIC NEGATIVE: 1
HEADACHES: 0
SHORTNESS OF BREATH: T
FALLS: 0
NAUSEA: DENIES
ABDOMINAL PAIN: 0
NEUROLOGICAL NEGATIVE: 1
BLURRED VISION: 0
CONSTITUTIONAL NEGATIVE: 1
NAUSEA: DENIES
BLURRED VISION: 0
DIZZINESS: 0
CHILLS: 0
PALPITATIONS: 0
PALPITATIONS: 0
SHORTNESS OF BREATH: 0
ORTHOPNEA: 0
ORTHOPNEA: 0
SHORTNESS OF BREATH: 0
ABDOMINAL PAIN: 0
SPUTUM PRODUCTION: 0
NERVOUS/ANXIOUS: 0
MUSCLE WEAKNESS: 1
HEARTBURN: 0
FALLS: 0
FEVER: 0
ORTHOPNEA: 0
MYALGIAS: 1
CARDIOVASCULAR NEGATIVE: 1
NAUSEA: 0
ABDOMINAL PAIN: 0
EYE PAIN: 0
SPUTUM PRODUCTION: 0
SENSORY CHANGE: 0
FEVER: 0
SPUTUM PRODUCTION: 0
RESPIRATORY NEGATIVE: 1
VOMITING: DENIES
MYALGIAS: 0
HEMOPTYSIS: 0
CONSTITUTIONAL NEGATIVE: 1
HEADACHES: 0
GASTROINTESTINAL NEGATIVE: 1
HEARTBURN: 0
FEVER: 0
SEIZURES: 0
DEPRESSION: 0
VOMITING: 0
SPEECH CHANGE: 0
VOMITING: 0
NAUSEA: DENIES
DIARRHEA: 0
BACK PAIN: 0
INSOMNIA: 0
BACK PAIN: 0
FEVER: 0
MYALGIAS: 1
CHILLS: 0
SHORTNESS OF BREATH: 0
VOMITING: DENIES
NAUSEA: 0
EYE DISCHARGE: 0
CHILLS: 0
SHORTNESS OF BREATH: T
MYALGIAS: 1
HEADACHES: 0
FEVER: 0
MUSCLE WEAKNESS: 1
WEAKNESS: 0
CARDIOVASCULAR NEGATIVE: 1
ORTHOPNEA: 0
NERVOUS/ANXIOUS: 0
NAUSEA: DENIES
DIZZINESS: 0
DIARRHEA: 0
SEIZURES: 0
ABDOMINAL PAIN: 0
NAUSEA: 0
COUGH: 0
SHORTNESS OF BREATH: 0
HEADACHES: 0
EYE REDNESS: 0
STRIDOR: 0
PALPITATIONS: 0
PHOTOPHOBIA: 0
TREMORS: 0
STRIDOR: 0
BACK PAIN: 0
STRIDOR: 0
CHILLS: 0
DEPRESSION: 0
SHORTNESS OF BREATH: 1
MYALGIAS: 1
DIZZINESS: 0
EYE DISCHARGE: 0
NAUSEA: DENIES
SPUTUM PRODUCTION: 0
DEPRESSION: 0
NAUSEA: 0
SHORTNESS OF BREATH: 0
WEIGHT LOSS: 0
LOSS OF CONSCIOUSNESS: 0
NAUSEA: 0
MYALGIAS: 1
SHORTNESS OF BREATH: 0
MUSCLE WEAKNESS: 1
RESPIRATORY NEGATIVE: 1
SPUTUM PRODUCTION: 0
SPUTUM PRODUCTION: 0
WEAKNESS: 0
MYALGIAS: 1
NERVOUS/ANXIOUS: 0
ORTHOPNEA: 0
NEUROLOGICAL NEGATIVE: 1
COUGH: 0
SHORTNESS OF BREATH: 1
INSOMNIA: 0
NEUROLOGICAL NEGATIVE: 1
NERVOUS/ANXIOUS: 0
HEADACHES: 0
CHILLS: 0
CONSTIPATION: 0
PHOTOPHOBIA: 0
MUSCLE WEAKNESS: 1
GASTROINTESTINAL NEGATIVE: 1
SINUS PAIN: 0
VOMITING: DENIES
MYALGIAS: 1
STRIDOR: 0
ABDOMINAL PAIN: 0
FOCAL WEAKNESS: 0
SPUTUM PRODUCTION: 0
MYALGIAS: 1
CHILLS: 0
DIARRHEA: 0
COUGH: 0
NAUSEA: DENIES
GASTROINTESTINAL NEGATIVE: 1
MUSCLE WEAKNESS: 1
NAUSEA: DENIES
VOMITING: DENIES
CHILLS: 0
DIZZINESS: 0
MYALGIAS: 0
COUGH: 0
VOMITING: 0
PHOTOPHOBIA: 0
MUSCLE WEAKNESS: 1
NAUSEA: 0
SHORTNESS OF BREATH: 0
NERVOUS/ANXIOUS: 0
VOMITING: 0
VOMITING: 0
WEIGHT LOSS: 0
BACK PAIN: 0
MUSCLE WEAKNESS: 1
DIZZINESS: 0
SORE THROAT: 0
ABDOMINAL PAIN: 0
PALPITATIONS: 0
FEVER: 0
NERVOUS/ANXIOUS: 0
MUSCLE WEAKNESS: 1
MYALGIAS: 1
HALLUCINATIONS: 0
FOCAL WEAKNESS: 0
NAUSEA: 0
VOMITING: 0

## 2020-01-01 ASSESSMENT — GAIT ASSESSMENTS
GAIT LEVEL OF ASSIST: UNABLE TO PARTICIPATE

## 2020-01-01 ASSESSMENT — COGNITIVE AND FUNCTIONAL STATUS - GENERAL
CLIMB 3 TO 5 STEPS WITH RAILING: TOTAL
SUGGESTED CMS G CODE MODIFIER DAILY ACTIVITY: CK
WALKING IN HOSPITAL ROOM: TOTAL
MOVING TO AND FROM BED TO CHAIR: A LITTLE
HELP NEEDED FOR BATHING: A LITTLE
MOVING FROM LYING ON BACK TO SITTING ON SIDE OF FLAT BED: A LOT
SUGGESTED CMS G CODE MODIFIER MOBILITY: CJ
DRESSING REGULAR UPPER BODY CLOTHING: A LITTLE
WALKING IN HOSPITAL ROOM: TOTAL
SUGGESTED CMS G CODE MODIFIER DAILY ACTIVITY: CK
CLIMB 3 TO 5 STEPS WITH RAILING: TOTAL
STANDING UP FROM CHAIR USING ARMS: A LITTLE
WALKING IN HOSPITAL ROOM: TOTAL
TOILETING: A LITTLE
STANDING UP FROM CHAIR USING ARMS: A LITTLE
STANDING UP FROM CHAIR USING ARMS: A LITTLE
PERSONAL GROOMING: A LITTLE
SUGGESTED CMS G CODE MODIFIER MOBILITY: CL
CLIMB 3 TO 5 STEPS WITH RAILING: TOTAL
MOVING FROM LYING ON BACK TO SITTING ON SIDE OF FLAT BED: A LITTLE
MOBILITY SCORE: 11
DAILY ACTIVITIY SCORE: 16
WALKING IN HOSPITAL ROOM: TOTAL
TURNING FROM BACK TO SIDE WHILE IN FLAT BAD: A LOT
MOBILITY SCORE: 15
MOVING TO AND FROM BED TO CHAIR: A LITTLE
SUGGESTED CMS G CODE MODIFIER DAILY ACTIVITY: CJ
MOBILITY SCORE: 21
MOVING FROM LYING ON BACK TO SITTING ON SIDE OF FLAT BED: A LITTLE
MOVING TO AND FROM BED TO CHAIR: A LOT
DRESSING REGULAR LOWER BODY CLOTHING: A LITTLE
MOBILITY SCORE: 10
TOILETING: A LOT
DRESSING REGULAR LOWER BODY CLOTHING: A LOT
HELP NEEDED FOR BATHING: A LOT
TURNING FROM BACK TO SIDE WHILE IN FLAT BAD: A LOT
MOBILITY SCORE: 15
MOVING FROM LYING ON BACK TO SITTING ON SIDE OF FLAT BED: UNABLE
DAILY ACTIVITIY SCORE: 24
SUGGESTED CMS G CODE MODIFIER MOBILITY: CK
DAILY ACTIVITIY SCORE: 19
DAILY ACTIVITIY SCORE: 22
MOVING TO AND FROM BED TO CHAIR: A LOT
DRESSING REGULAR UPPER BODY CLOTHING: A LITTLE
STANDING UP FROM CHAIR USING ARMS: A LITTLE
PERSONAL GROOMING: A LITTLE
DRESSING REGULAR LOWER BODY CLOTHING: A LOT
SUGGESTED CMS G CODE MODIFIER MOBILITY: CK
CLIMB 3 TO 5 STEPS WITH RAILING: TOTAL
CLIMB 3 TO 5 STEPS WITH RAILING: TOTAL
SUGGESTED CMS G CODE MODIFIER DAILY ACTIVITY: CH
SUGGESTED CMS G CODE MODIFIER MOBILITY: CL

## 2020-01-01 ASSESSMENT — ACTIVITIES OF DAILY LIVING (ADL)
CURRENT_FUNCTION: STAND BY ASSIST
AMBULATION ASSISTANCE: STAND BY ASSIST
ADLS_COMMENTS: PLEASE REFER TO OT ASSESSMENT
BATHING_COMMENTS: PLEASE REFER TO OT ASSESSMENT
AMBULATION ASSISTANCE: STAND BY ASSIST
CURRENT_FUNCTION: STAND BY ASSIST
CURRENT_FUNCTION: STAND BY ASSIST
AMBULATION ASSISTANCE: STAND BY ASSIST
AMBULATION ASSISTANCE: STAND BY ASSIST
CURRENT_FUNCTION: STAND BY ASSIST
AMBULATION ASSISTANCE: STAND BY ASSIST
AMBULATION ASSISTANCE: STAND BY ASSIST
FEEDING_COMMENTS: PLEASE REFER TO OT ASSESSMENT
GROOMING_COMMENTS: PLEASE REFER TO OT ASSESSMENT
TOILETING: INDEPENDENT
OASIS_M1830: 05
AMBULATION ASSISTANCE: STAND BY ASSIST
CURRENT_FUNCTION: STAND BY ASSIST
CURRENT_FUNCTION: STAND BY ASSIST
TOILETING: INDEPENDENT
AMBULATION ASSISTANCE: STAND BY ASSIST
AMBULATION ASSISTANCE: NON-AMBULATORY
CURRENT_FUNCTION: STAND BY ASSIST
CURRENT_FUNCTION: STAND BY ASSIST
HOME_HEALTH_OASIS: 03
OASIS_M1830: 05
OASIS_M1830: 03

## 2020-01-01 ASSESSMENT — LIFESTYLE VARIABLES
TOTAL SCORE: 0
EVER HAD A DRINK FIRST THING IN THE MORNING TO STEADY YOUR NERVES TO GET RID OF A HANGOVER: NO
HOW MANY TIMES IN THE PAST YEAR HAVE YOU HAD 5 OR MORE DRINKS IN A DAY: 0
ALCOHOL_USE: NO
TOTAL SCORE: 0
TOTAL SCORE: 0
CONSUMPTION TOTAL: NEGATIVE
SUBSTANCE_ABUSE: 0
HAVE YOU EVER FELT YOU SHOULD CUT DOWN ON YOUR DRINKING: NO
CONSUMPTION TOTAL: INCOMPLETE
DO YOU DRINK ALCOHOL: NO
EVER HAD A DRINK FIRST THING IN THE MORNING TO STEADY YOUR NERVES TO GET RID OF A HANGOVER: NO
AVERAGE NUMBER OF DAYS PER WEEK YOU HAVE A DRINK CONTAINING ALCOHOL: 0
EVER FELT BAD OR GUILTY ABOUT YOUR DRINKING: NO
TOTAL SCORE: 0
EVER_SMOKED: NEVER
HAVE YOU EVER FELT YOU SHOULD CUT DOWN ON YOUR DRINKING: NO
TOTAL SCORE: 0
EVER FELT BAD OR GUILTY ABOUT YOUR DRINKING: NO
HAVE PEOPLE ANNOYED YOU BY CRITICIZING YOUR DRINKING: NO
SUBSTANCE_ABUSE: 0
TOTAL SCORE: 0
HAVE PEOPLE ANNOYED YOU BY CRITICIZING YOUR DRINKING: NO
DOES PATIENT WANT TO STOP DRINKING: NO
ON A TYPICAL DAY WHEN YOU DRINK ALCOHOL HOW MANY DRINKS DO YOU HAVE: 0

## 2020-01-01 ASSESSMENT — PATIENT HEALTH QUESTIONNAIRE - PHQ9
5. POOR APPETITE OR OVEREATING: 2 - MORE THAN HALF THE DAYS
CLINICAL INTERPRETATION OF PHQ2 SCORE: 1
CLINICAL INTERPRETATION OF PHQ2 SCORE: 0
2. FEELING DOWN, DEPRESSED, IRRITABLE, OR HOPELESS: 00
SUM OF ALL RESPONSES TO PHQ9 QUESTIONS 1 AND 2: 0
1. LITTLE INTEREST OR PLEASURE IN DOING THINGS: 00
1. LITTLE INTEREST OR PLEASURE IN DOING THINGS: NOT AT ALL
CLINICAL INTERPRETATION OF PHQ2 SCORE: 0
SUM OF ALL RESPONSES TO PHQ QUESTIONS 1-9: 4
1. LITTLE INTEREST OR PLEASURE IN DOING THINGS: 00
2. FEELING DOWN, DEPRESSED, IRRITABLE, OR HOPELESS: 01
2. FEELING DOWN, DEPRESSED, IRRITABLE, OR HOPELESS: NOT AT ALL

## 2020-01-01 ASSESSMENT — PAIN SCALES - GENERAL: PAIN_LEVEL: 0

## 2020-01-01 NOTE — ANESTHESIA PROCEDURE NOTES
Airway  Performed by: Lawson Dumas M.D.  Authorized by: Lawson Dumas M.D.     Location:  OR  Urgency:  Elective  Indications for Airway Management:  Anesthesia  Spontaneous Ventilation: absent    Sedation Level:  Deep  Preoxygenated: Yes    Final Airway Type:  Supraglottic airway  Final Supraglottic Airway:  Standard LMA  SGA Size:  5  Number of Attempts at Approach:  1

## 2020-01-01 NOTE — CARE PLAN
Problem: Pain Management  Goal: Pain level will decrease to patient's comfort goal  Outcome: PROGRESSING SLOWER THAN EXPECTED  Note:   Patient experiencing minimal pain relief with MAR medication. Patient encouraged to call if pain worsens. Hourly rounding in place.    Problem: Safety  Goal: Will remain free from injury  Outcome: PROGRESSING AS EXPECTED  Note:   Patient free from accidental injury at this time. Patient calls appropriately. Safety precautions in place including bed alarm. Hourly rounding in place.

## 2020-01-01 NOTE — CARE PLAN
Problem: Venous Thromboembolism (VTW)/Deep Vein Thrombosis (DVT) Prevention:  Goal: Patient will participate in Venous Thrombosis (VTE)/Deep Vein Thrombosis (DVT)Prevention Measures  Outcome: PROGRESSING AS EXPECTED  Pt refused SCDs. Educated regarding why SCDs are used and pt continues to refuse.      Problem: Bowel/Gastric:  Goal: Normal bowel function is maintained or improved  Outcome: PROGRESSING AS EXPECTED  Goal: Will not experience complications related to bowel motility  Outcome: PROGRESSING AS EXPECTED  Educated regarding being NPO in preparation for procedure in the AM. Pt verbalized understanding.

## 2020-01-01 NOTE — ANESTHESIA POSTPROCEDURE EVALUATION
Patient: Antonio Walker    Procedure Summary     Date:  01/01/20 Room / Location:  [place] [address] / SURGERY [facility]    Anesthesia Start:  1009 Anesthesia Stop:  1144    Procedures:       ANGIOGRAM (Leg Lower)      CREATION, BYPASS, ARTERIAL, FEMORAL TO TIBIAL (Left Leg Lower) Diagnosis:  (PAD with rest pain)    Surgeon:  Gage Valles M.D. Responsible Provider:  Lawson Dumas M.D.    Anesthesia Type:  general ASA Status:  3          Final Anesthesia Type: general  Last vitals  BP   Blood Pressure : (!) 170/55    Temp   36.6 °C (97.9 °F)    Pulse   Pulse: 61   Resp   (!) 7    SpO2   100 %      Anesthesia Post Evaluation    Patient location during evaluation: PACU  Patient participation: complete - patient participated  Level of consciousness: awake and alert    Airway patency: patent  Anesthetic complications: no  Cardiovascular status: hemodynamically stable  Respiratory status: acceptable  Hydration status: euvolemic    PONV: none           Nurse Pain Score: 8 (NPRS)

## 2020-01-01 NOTE — PROGRESS NOTES
Bedside report received.  Assessment complete.  A&O x 4. Patient calls appropriately.  Patient up with one assist to pivot. Bed alarm on.   Patient has 8/10 pain. Medicated per MAR.  Denies N&V. Tolerating diet.  Surgical incision to L groin, prevena in place  R previous AKA  + void, + flatus, 12/31 BM.  Patient denies SOB.  Patent leaving floor to pre-op.  Review plan with of care with patient. Call light and personal belongings with in reach. Hourly rounding in place. All needs met at this time.

## 2020-01-01 NOTE — OP REPORT
01/01/20    OPERATIVE NOTE    PRE-OPERATIVE DIAGNOSIS -     1.  Peripheral arterial disease  2.  Left lower extremity rest pain    POST-OPERATIVE DIAGNOSIS -same    PROCEDURE PERFORMED -     1.  Left femoral to above-knee popliteal artery bypass using 6 mm PTFE  2.  Ultrasound-guided access left common femoral artery  3.  Left lower extremity angiogram    SURGEON - Gage Valles M.D.    ASSISTANT - MELQUIADES Chambers    TYPE OF ANESTHESIA - General     ANESTHESIOLOGIST  -Dr. Dumas      SPECIMENS -none    INDICATIONS - 69 y.o. gentleman with severe peripheral arterial disease.  The patient has a right above-knee amputation which he had performed several years ago in [place].  Patient presented with left lower extremity severe ischemia and rest pain.  Patient initially underwent a left common femoral and profunda femoris artery endarterectomy for an occluded left common femoral artery.  By reperfusion the profunda femoris artery the hopes was that the patient's rest pain would resolve.  Despite this procedure the patient had persistent rest pain in his toes.  Patient is therefore being taken back to the operating room a week later for repeat angiogram and distal revascularization.      PROCEDURE IN DETAIL -     Patient was taken to the operating suite placed in the supine position.  After adequate anesthesia the patient's left lower extremity was circumferentially prepped and draped in sterile fashion.  Using ultrasound guidance a thinwall access needle was inserted into the left common femoral artery.  A 4 Ukrainian sheath was placed and angiogram of the left lower extremity was performed which demonstrated occlusion of the left superficial femoral artery with reconstitution above the knee popliteal artery was diffusely irregular but patent the patient had what appeared to be dominant anterior tibial artery runoff with fairly significant tibial disease but felt to be adequate for a femoral to above-knee popliteal  artery bypass to him.  Improve circulation to the foot.  Incision was made in the left groin through the patient's previous incision.  The incision was carried down through the subcutaneous tissue and the left common femoral and profunda femoris arteries were dissected from the surrounding tissue and isolated.  An incision was made just above the knee and a medial approach to the above-knee popliteal artery was performed the left popliteal artery was circumferentially dissected and isolated.  A Gale-Wick tunneler was then used to tunnel a 6 mm externally supported PTFE graft from the groin incision to the above-knee incision.  6000 as of heparin was administered after an appropriate period of time the common femoral and profunda femoris artery were occluded.  A longitudinal incision was made in the patient's previous patch.  The PTFE graft was then spatulated and end-to-side anastomosis was made between the common femoral artery and the PTFE graft using a 5-0 Prolene suture in a running fashion.  Incision was made in a longitudinal fashion in the above-knee popliteal artery the graft was cut to length and probably spatulated and an inside anastomosis was completed between the PTFE graft and the above-knee popliteal artery using a 5-0 Prolene suture in running fashion after for flushing and back flushing the anastomosis was completed and flow was established to the left lower extremity.  Excellent flow was noted in the bypass graft.  Hemostasis was assured.  30 mg of protamine was administered.  The groin incision was closed using a running 2-0 Vicryl running suture.  The patient's 4 Indonesian sheath was retrieved and pressure was held at the site.  Additional 2-0 Vicryl sutures were used for the subcutaneous tissue.  The above-knee incision was also closed using interrupted 2-0 Vicryl sutures.  Skin staples were used to reapproximate all skin incisions.  A Jaki was placed in the patient's left groin.  Sterile  dressings were applied and the patient was taken to the recovery room.      Gage Valles M.D.

## 2020-01-01 NOTE — PROGRESS NOTES
Dr. Wan returned page. Received order to modify NPO diet to NPO with sips and to order Hydralazine 10 mg once PRN if BP remains elevated > 160 mmHg.

## 2020-01-01 NOTE — ANESTHESIA PREPROCEDURE EVALUATION
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   (+) Critical lower limb ischemia   (+) Essential hypertension   (+) PAD (peripheral artery disease) (HCC)         (+) ADRIA (acute kidney injury) (HCC)       Physical Exam    Airway   Mallampati: II  TM distance: >3 FB  Neck ROM: full       Cardiovascular - normal exam  Rhythm: regular  Rate: normal  (-) murmur     Dental - normal exam         Pulmonary - normal exam  Breath sounds clear to auscultation     Abdominal    Neurological - normal exam                 Anesthesia Plan    ASA 3       Plan - general                       Informed Consent:        vasc disease

## 2020-01-01 NOTE — H&P
General Surgery H&P  -------------------------------------------------------------------------------------------------  Date: 1/1/2020    Surgeon: Gage Valles M.D. - [facility]  -------------------------------------------------------------------------------------------------    HPI:  This is a 69 y.o. male who is presenting today for elective surgery.  He has left foot pain despite femoral endarterectomy.    History reviewed. No pertinent past medical history.    Past Surgical History:   Procedure Laterality Date   • FEMORAL ENDARTERECTOMY Left 12/27/2019    Procedure: ENDARTERECTOMY, FEMORAL;  Surgeon: Gage Valles M.D.;  Location: SURGERY [facility] [place];  Service: General   • ANGIOGRAM Left 12/27/2019    Procedure: ANGIOGRAM;  Surgeon: Gage Valles M.D.;  Location: SURGERY [facility] [place];  Service: General       Current Facility-Administered Medications   Medication Dose Route Frequency Provider Last Rate Last Dose   • [MAR Hold] hydrALAZINE (APRESOLINE) injection 10 mg  10 mg Intravenous Once PRN Sushil Wan M.D.       • [MAR Hold] oxyCODONE immediate release (ROXICODONE) tablet 10 mg  10 mg Oral Q4HRS PRN Fei Rodriguez M.D.   10 mg at 01/01/20 0345   • [MAR Hold] gabapentin (NEURONTIN) capsule 400 mg  400 mg Oral BID Fei Rodriguez M.D.   400 mg at 01/01/20 0503   • [MAR Hold] tamsulosin (FLOMAX) capsule 0.4 mg  0.4 mg Oral AFTER BREAKFAST Courtney R Ralleca-Llaguno, A.P.RArielNAriel   0.4 mg at 12/31/19 0853   • NS infusion   Intravenous Continuous Fei Rodriguez M.D. 83 mL/hr at 01/01/20 0211     • [MAR Hold] metoprolol SR (TOPROL XL) tablet 50 mg  50 mg Oral DAILY Govind Bullard M.D.   50 mg at 01/01/20 0503   • [MAR Hold] Pharmacy Consult Request - to monitor for nephrotoxic agents  1 Each Other PHARMACY TO DOSE Govind Bullard M.D.       • [MAR Hold] senna-docusate (PERICOLACE or SENOKOT S) 8.6-50 MG per tablet 2 Tab  2 Tab Oral BID Govind Bullard M.D.   2 Tab  at 12/30/19 0537    And   • [MAR Hold] polyethylene glycol/lytes (MIRALAX) PACKET 1 Packet  1 Packet Oral QDAY PRN Govind Bullard M.D.   1 Packet at 12/30/19 0810    And   • [MAR Hold] magnesium hydroxide (MILK OF MAGNESIA) suspension 30 mL  30 mL Oral QDAY PRN Govind Bullard M.D.        And   • [MAR Hold] bisacodyl (DULCOLAX) suppository 10 mg  10 mg Rectal QDAY PRN Govind Bullard M.D.       • [MAR Hold] Respiratory Care per Protocol   Nebulization Continuous RT Govind Bullard M.D.       • [MAR Hold] acetaminophen (TYLENOL) tablet 650 mg  650 mg Oral Q6HRS PRN Govind Bullard M.D.   650 mg at 12/31/19 1725   • [MAR Hold] Pharmacy Consult Request ...Pain Management Review 1 Each  1 Each Other PHARMACY TO DOSE Govind Bullard M.D.        And   • [MAR Hold] morphine (pf) 4 MG/ML injection 2 mg  2 mg Intravenous Q3HRS PRVARGHESE Bullard M.D.   2 mg at 01/01/20 0854   • [MAR Hold] ondansetron (ZOFRAN) syringe/vial injection 4 mg  4 mg Intravenous Q4HRS PRN Govind Bullard M.D.   4 mg at 12/27/19 1038   • [MAR Hold] ondansetron (ZOFRAN ODT) dispertab 4 mg  4 mg Oral Q4HRS PRN Govind Bullard M.D.       • [MAR Hold] prochlorperazine (COMPAZINE) injection 10 mg  10 mg Intravenous Q6HRS PRVARGHESE Adler M.D.   10 mg at 12/26/19 2201   • [MAR Hold] labetalol (NORMODYNE/TRANDATE) injection 10 mg  10 mg Intravenous Q6HRS PRVARGHESE Adler M.D.   10 mg at 01/01/20 0328       Social History     Socioeconomic History   • Marital status:      Spouse name: Not on file   • Number of children: Not on file   • Years of education: Not on file   • Highest education level: Not on file   Occupational History   • Not on file   Social Needs   • Financial resource strain: Not on file   • Food insecurity:     Worry: Not on file     Inability: Not on file   • Transportation needs:     Medical: Not on file     Non-medical: Not on file   Tobacco Use   • Smoking status: Former Smoker      "Packs/day: 0.00   Substance and Sexual Activity   • Alcohol use: Not on file   • Drug use: Not on file   • Sexual activity: Not on file   Lifestyle   • Physical activity:     Days per week: Not on file     Minutes per session: Not on file   • Stress: Not on file   Relationships   • Social connections:     Talks on phone: Not on file     Gets together: Not on file     Attends Yarsani service: Not on file     Active member of club or organization: Not on file     Attends meetings of clubs or organizations: Not on file     Relationship status: Not on file   • Intimate partner violence:     Fear of current or ex partner: Not on file     Emotionally abused: Not on file     Physically abused: Not on file     Forced sexual activity: Not on file   Other Topics Concern   • Not on file   Social History Narrative   • Not on file       History reviewed. No pertinent family history.    Allergies:  Patient has no known allergies.    Review of Systems:  Noncontributory except as per HPI    Physical Exam:  BP (!) 195/83   Pulse 81   Temp 36.6 °C (97.9 °F) (Temporal)   Resp 18   Ht 1.702 m (5' 7\")   Wt 68.4 kg (150 lb 12.7 oz)   SpO2 97%     Constitutional: Alert, oriented, no acute distress  HEENT:  Normocephalic and atraumatic, EOMI  Neck:   Supple, no JVD,   Cardiovascular: Regular rate and rhythm,   Pulmonary:  Good air entry bilaterally,    Abdominal:  Soft, non-tender, non-distended     Aortic impulse not widened  Musculoskeletal: No edema, no tenderness  Neurological:  CN II-XII grossly intact, no focal deficits  Skin:   Skin is warm and dry. No rash noted.  Psychiatric:  Normal mood and affect.    Labs:  Recent Labs     12/30/19  0512 12/31/19  0524   WBC 5.8 5.4   RBC 2.89* 3.16*   HEMOGLOBIN 8.8* 9.6*   HEMATOCRIT 27.4* 30.1*   MCV 94.8 95.3   MCH 30.4 30.4   MCHC 32.1* 31.9*   RDW 47.2 48.6   PLATELETCT 182 207   MPV 10.1 10.0     Recent Labs     12/30/19  0512 12/31/19  0524   SODIUM 133* 137   POTASSIUM 4.1 4.2 "   CHLORIDE 105 107   CO2 23 23   GLUCOSE 85 83   BUN 19 15   CREATININE 1.42* 1.49*   CALCIUM 8.4* 8.6         Recent Labs     12/31/19  0524   ASTSGOT 14   ALTSGPT 7   TBILIRUBIN 0.5   ALKPHOSPHAT 82   GLOBULIN 3.1       Assessment/Plan:  This is a 69 y.o. male here today for elective surgery: He will undergo angio gram and revascularization.    I explained the details of the operation, alternatives, and potential risks, including but not limited to bleeding, infection, and risks of anesthesia.  All questions were answered. Patient understands and agrees to proceed.

## 2020-01-01 NOTE — ANESTHESIA TIME REPORT
Anesthesia Start and Stop Event Times     Date Time Event    1/1/2020 0952 Ready for Procedure     1009 Anesthesia Start     1144 Anesthesia Stop        Responsible Staff  01/01/20    Name Role Begin End    Lawson Dumas M.D. Anesth 1009 1144        Preop Diagnosis (Free Text):  Pre-op Diagnosis     left foot pain despite femoral endarterectomy        Preop Diagnosis (Codes):    Post op Diagnosis  Ischemic foot ulcer due to atherosclerosis of native artery of limb (HCC)      Premium Reason  F. Holiday    Comments:

## 2020-01-01 NOTE — PROGRESS NOTES
Pt is A&Ox4.   Reports pain to LLE, medicated per MAR  (R) AKA. Pt is WC bound at baseline. Requires x 1 assist up to BSC.   On 1L O2 NC, denies SOB or chest pain; achieves 1000 on IS.   Normoactive BS x 4. Tolerating full liquid diet. Educated that he will be NPO after midnight. Denies nausea/vomiting.   + flatus, LBM 12/31  + void   (L)LE with noted redness and swelling. Eschar noted on 5th digit on (L) foot.   PIV running IVF  Updated on POC. Belongings and call light within reach. All needs met at this time.

## 2020-01-01 NOTE — PROGRESS NOTES
Pt reporting pain even after dose of 2 mg Morphine PRN. Pt is strict NPO, thus this RN unable to give oxycodone PRN. Dr. Wan paged for additional orders.

## 2020-01-02 NOTE — PROGRESS NOTES
Bedside report received.  Assessment complete.  A&O x 4. Patient calls appropriately.  Patient up with one assist to pivot. Bed alarm on.   Patient has 7/10 pain. Medicated per MAR.  Denies N&V. Tolerating diet.  Surgical incision to L groin, prevena in place  L ace wrap in place from thigh to foot, no drainage, pedal pulse dopplered  R previous AKA  + void, + flatus, 1/1 BM.  Patient denies SOB.  Review plan with of care with patient. Call light and personal belongings with in reach. Hourly rounding in place. All needs met at this time.

## 2020-01-02 NOTE — THERAPY
"Physical Therapy Evaluation completed.   Bed Mobility: Supine to sit: Minimal Assist with HOB elevated    Transfers: Bed to chair: Minimal Assist   Gait: Unable to participate   Plan of Care: Will benefit from Physical Therapy 4 times per week  Discharge Recommendations: Equipment: Will Continue to Assess for Equipment Needs. Post-acute therapy: Recommend post-acute placement for continued physical therapy services prior to discharge home. Patient can tolerate post-acute therapies at a 5x/week frequency.     Pt is 68 y/o M s/p L femoral artery thrombectomy POD #5 and L femoral above-knee popliteal artery bypass POD #1. PMH includes R CARRIE sher several years prior. Pt reports WC dependent at baseline and mod I with mob in home. He demonstrated difficulty with bed mob and squat pivot transfer from bed to chair d/t LLE pain requiring min A. Pt would benefit from post-acute placement prior to DC home as family is not home during the day to assist.     See \"Rehab Therapy-Acute\" Patient Summary Report for complete documentation.     "

## 2020-01-02 NOTE — THERAPY
"Occupational Therapy Evaluation completed.   Functional Status:  Pt presents to skilled OT services following L femoral thrombectomy POD #5 and L femoral above knee popliteal artery bypass POD # 1, h/o R AKA chronic, reports I with ADLs from w/c level and receives MOW services. Pt performed bed mobility with min a from raised hob and use of bed rail, max a to don sock on LLE d/t increased pain, F balance sitting eob, eob->chair t/f with min a, noted incontinent for BM in bed, reports has intermittent accidents at home as sometimes when he's not able to get to bathroom quickly. Pt is currently functioning below baseline with ADLs and t/f's, given that is home by himself most of the day there's questionable concern regarding pt's ability to safely manage himself during toileting task and ADL t/f's. Pt will benefit from acute skilled OT services and post acute placement recommended at this time.   Plan of Care: Will benefit from Occupational Therapy 3 times per week  Discharge Recommendations:  Equipment: Will Continue to Assess for Equipment Needs. Post-acute therapy Recommend post-acute placement for additional occupational therapy services prior to discharge home. Patient can tolerate post-acute therapies at a 5x/week frequency.      See \"Rehab Therapy-Acute\" Patient Summary Report for complete documentation.    "

## 2020-01-02 NOTE — PROGRESS NOTES
2 RN skin check -     Skin over bony prominences intact.   Pt has a (R) AKA.   ACE wrap in place to (L)LE.   Prevena to (L) groin.     At this time, pt refusing to have waffle overlay placed due to pain. LLE elevated on pillows. Silicone NC in use.

## 2020-01-02 NOTE — PROGRESS NOTES
Vascular surgery    Postop day 1 status post left femoral to above-knee popliteal artery bypass    Patient doing well postoperatively.  Foot is warm and well-perfused.  Incision is clean dry and intact    Patient reports improvement and rest pain.    Patient doing well postoperatively graft remains patent.    Monitor for couple days prior to discharge.    Gage Valles MD

## 2020-01-02 NOTE — PROGRESS NOTES
Patient is A&Ox4.   Reports pain to left LE, medicated per PADMA PARMAR, CMS intact, reports numbness and tingling to left foot.   On bedrest at this time. ACE wrap along LLE. Prevena wound vac in place to (L) groin, dressing CDI.   On 1L O2 NC, denies SOB or chest pain; achieves 1000 on IS.   Hypoactive BS x 4. Tolerating regular diet. Denies nausea/vomiting.   - flatus, - BM as of this time.   + void via bedside urinal.   PIV running IVF  Updated on POC. Belongings and call light within reach. All needs met at this time.

## 2020-01-02 NOTE — PROGRESS NOTES
[facility] Medicine Daily Progress Note    Date of Service  1/1/2020    Chief Complaint  69 y.o. male admitted 12/26/2019 with pain in left lower extremity    Hospital Course    Mr. Walker is a 69-year-old male with a PMH of PAD, right AKA in 2014, HTN, tobacco use, DM 2, HDL, who presented to ER on 12/2619 due to increasing pain and discoloration on his left lower extremity which started about a week ago.  Patient reports that pain and discoloration is started on his left heel then became more painful into the forefoot and toes.  Due to his right AKA, patient ambulates with a wheelchair only.  Patient also notes that he recently moved from the [facility] to [facility].  Patient also reports that he has has a history of liver and kidney disease which apparently resolved prior to admission.  Patient stop smoking approximately 6 years ago.  During evaluation in ER, patient found to have an acute on chronic left lower extremity arterial occlusion starting from the femoral area.  Patient started on a Heparin gtt, and vascular surgery, Dr. Valles, was consulted.  Patient denies any chest pain, no palpitations, and denies any prior heart history.  Patient was admitted into the general surgical unit.      Interval Problem Update  12/27/19:  Patient seen and examined this morning prior to procedure.  Right AKA noted, left lower extremity very tender to touch, discoloration from heel to calf.  Patient noted to have a procedure with vascular surgeon, Dr. Valles, for a left lower angiogram with possible intervention.  Patient kept n.p.o., Heparin gtt protocol in place with clarification from surgeon when to stop drip prior to procedure.  Otherwise, patient has no concerns at this time.  Pending results from a vascular procedure today.  Patient hyponatremic at 126, glucose at 347 which may be due to n.p.o. status and D5W infusion.  Creatinine at 2.58 and BUN at 55, which more likely be contributed from dehydration.  Will  monitor and treat after vascular procedure today.  12/28/19: Patient seen and examined today with no overnight episodes.  Patient reports tolerating the procedure with vascular surgery yesterday 12/27/2019.  Patient still complains of pain in the left lower extremity.  No palpable pulses palpated over dorsalis pedis and posterior tibial.  Doppler machine was also utilized on these pulse point was on the foot, no pulses heard with Doppler machine.  Vascular surgeon, Dr. Valles aware and guarded for any immediate intervention at this time as patient is POD#1 from left femoral thromboendarterectomy and profundoplasty.  Per vascular surgery, patient will be observed for a couple of days, if pain persist with no resolution, will consider tibial bypass versus amputation.  Patient notified and plan of care.  Patient will also be started on Flomax.  Bladder scan will be obtained with possible discontinuation of Kinney tomorrow.  Otherwise, laboratory work from this morning is unremarkable with no significant changes from previous results.  There are no significant changes from my previous ROS and PE from yesterday 12/27, please see note for further details.  Vital signs stable.  12/29/19: Patient seen and examined today with no overnight episodes.  Patient still complains of some foot pain, which is more likely reperfusion pain.  Patient had undergone an angioplasty with vascular surgeon, Dr. Valles yesterday 12/28/2019.  Dorsal pedis and posterior tibial pulses auscultated with the use of Doppler.  Left foot warm to touch, but .  Per recommendations of vascular surgeon, we will continue to monitor.  Bladder trial to start today, if patient tolerates Kinney will be D/C'd.  Notified bedside RN.  Otherwise, there are no significant changes from my previous ROS or PE.  Lab work from this morning was unremarkable with no significant changes from previous results.  VSS overnight.  12/30: Patient seen and examined today  with no overnight episodes.  Patient still complains of left foot pain.  Successful bladder trial yesterday 12/29, Gregoria KUMAR'shree, patient voiding.  Per vascular surgeon, Dr. Valles, patient to go back on Wednesday, 1/1/2024 angiogram of the left foot possible intervention due to recurrent pain.  Patient notified and plan of care.  Pain management in place.  ADRIA resolved with IVF therapy, we will continue to monitor.  There are no significant changes in my previous ROS or PE from previous note, please see previous note for further details.  Otherwise, lab work when this morning was unremarkable with no significant changes from previous results.  VSS overnight    12/31. Slightly anxious, he is hoping his L leg could be saved. He had R AKA already.  Currently he is planned for CT Angiography of his L leg for revision as he had LLE endarterectomy already  Options given to him include bypass for distal circulation, distal endarterectomy versus amputation  1/1. Plan for CT angiography in the OR today.    Consultants/Specialty  -Vascular surgery -Gage Valles    Code Status  Full    Disposition  He is open to skilled  PT/OT ordered    Review of Systems  Review of Systems   Constitutional: Negative.  Negative for chills and fever.   HENT: Negative.    Eyes: Negative.    Respiratory: Negative.    Cardiovascular: Negative.    Gastrointestinal: Negative.    Genitourinary: Negative.    Musculoskeletal: Positive for myalgias.   Skin: Positive for itching.   Neurological: Negative.    Endo/Heme/Allergies: Negative.    Psychiatric/Behavioral: Negative.         Physical Exam  Temp:  [36.4 °C (97.6 °F)-37.5 °C (99.5 °F)] 37 °C (98.6 °F)  Pulse:  [] 86  Resp:  [7-20] 18  BP: ()/(32-83) 154/65  SpO2:  [92 %-100 %] 95 %    Physical Exam  Vitals signs and nursing note reviewed.   Constitutional:       Appearance: Normal appearance.   HENT:      Head: Normocephalic.      Nose: Nose normal.      Mouth/Throat:      Mouth: Mucous  membranes are moist.      Pharynx: Oropharynx is clear.   Eyes:      Pupils: Pupils are equal, round, and reactive to light.   Neck:      Musculoskeletal: Normal range of motion and neck supple.   Cardiovascular:      Rate and Rhythm: Normal rate and regular rhythm.      Pulses: Normal pulses.      Heart sounds: Normal heart sounds.   Pulmonary:      Effort: Pulmonary effort is normal.      Breath sounds: Normal breath sounds.   Abdominal:      General: Abdomen is flat. Bowel sounds are normal.   Musculoskeletal:         General: Tenderness (LLE, also cool, thready pulses, unchanged) and deformity (R AKA) present.      Left lower leg: Edema present.        Legs:    Skin:     General: Skin is warm.      Capillary Refill: Capillary refill takes less than 2 seconds.      Findings: Erythema present.   Neurological:      General: No focal deficit present.      Mental Status: He is alert. Mental status is at baseline.   Psychiatric:         Mood and Affect: Mood normal.         Fluids    Intake/Output Summary (Last 24 hours) at 1/1/2020 1651  Last data filed at 1/1/2020 1500  Gross per 24 hour   Intake 7185.17 ml   Output 2815 ml   Net 4370.17 ml       Laboratory  Recent Labs     12/30/19  0512 12/31/19  0524   WBC 5.8 5.4   RBC 2.89* 3.16*   HEMOGLOBIN 8.8* 9.6*   HEMATOCRIT 27.4* 30.1*   MCV 94.8 95.3   MCH 30.4 30.4   MCHC 32.1* 31.9*   RDW 47.2 48.6   PLATELETCT 182 207   MPV 10.1 10.0     Recent Labs     12/30/19  0512 12/31/19  0524   SODIUM 133* 137   POTASSIUM 4.1 4.2   CHLORIDE 105 107   CO2 23 23   GLUCOSE 85 83   BUN 19 15   CREATININE 1.42* 1.49*   CALCIUM 8.4* 8.6                   Imaging  DX-PORTABLE FLUORO > 1 HOUR   Final Result      Digitized intraoperative radiograph is submitted for review.  This examination is not for diagnostic purpose but for guidance during a surgical procedure.      Actual fluoroscopic time is:1 minute 24 seconds, total dose 9.275 mGy      DX-PORTABLE FLUORO > 1 HOUR   Final  Result      Portable fluoroscopy utilized for 39 seconds.         INTERPRETING LOCATION: [address], [place], [address]      US-RENAL   Final Result      No hydronephrosis. No renal calculus.      US-EXTREMITY ARTERY LOWER UNILAT LEFT   Final Result      US-RIVAS SINGLE LEVEL BILAT   Final Result           Assessment/Plan  * Arterial occlusion due to arteriosclerosis  Assessment & Plan  -Of the left lower extremity, no dopplerable pulses, abnormal vascular study with occlusion in the femoral area  -Patient referred to vascular surgery, plan for intervention 12/27/19 with Dr. Valles  -Heparin drip - off prior to surgery on 12/27  -12/28: POD#1 from left femoral thromboendarterectomy and profundoplasty.  -Pain control  -Per Vascular Surgery, pt to go back to OR for LEFT LE angiogram and possible intervention  12/31. Anticipating LLE CT angiography for tomorrow.  Reassured patient.  1/1. Plan for CT angiography today.    ADRIA (acute kidney injury) (HCC)  Assessment & Plan  -With unknown baseline, the patient stated that he had CKD in the past but it resolved  -Attempt to correct with bicarb drip, follow closely, ultrasound of the renal structures  -Cr. 2.58 ; will recheck and treat as needed  -12/28: Cr 2.06  -12/30 Cr 1.42    Critical lower limb ischemia  Assessment & Plan  -Left lower extremity, plan for attempted revascularization with Dr. Valles 12/27  -12/28:  POD#1 from left femoral thromboendarterectomy and profundoplasty.  -12/28: no pulses palpated or auscultated with doppler over dorsalis pedus and posterior tibial points - Surgeon aware  -12/28: taken back to surgery; LEFT femoral artery angioplasty   -12/29: dorsal pedis and posterior tibial pulses auscultated with doppler  12/31. CT Angiography of LLE planned tomorrow.    History of tobacco abuse  Assessment & Plan  -Extensive, the patient apparently quit 6 years ago    S/P AKA (above knee amputation), right (HCC)  Assessment & Plan  -Secondary to vascular  complication right lower extremity  -AKA done in [place] in 2014    Essential hypertension  Assessment & Plan  -History of, control multimodality and adjust as indicated    Dyslipidemia  Assessment & Plan  -On statin, continue    PAD (peripheral artery disease) (HCC)  Assessment & Plan  -History of with right lower extremity amputation 2014   -history of extensive tobacco abuse       VTE prophylaxis: Heparin SQ; defer to Vascular Surgery re: anticoagulation.    Reviewed vitals, labs, imaging, staff notes.  Discussed assessment and plan with Antonio Walker   Discussed with LUIS FERNANDO

## 2020-01-02 NOTE — CARE PLAN
Problem: Pain Management  Goal: Pain level will decrease to patient's comfort goal  Outcome: PROGRESSING AS EXPECTED  Note:   Patient experiencing less pain than previous days, prior to 1/1 surgery. Patient encouraged to call if pain worsens. Hourly rounding in place.    Problem: Safety  Goal: Will remain free from injury  Outcome: PROGRESSING AS EXPECTED  Note:   Patient free from accidental injury at this time. Patient calls appropriately. Safety precautions in place including bed alarm. Hourly rounding in place.

## 2020-01-02 NOTE — PROGRESS NOTES
Pt's SBP remains > 160 mmHg despite administration of PRN catapres, PRN IV labetalol, and PRN pain medications (to control pain). At this time, only PRN Hydralazine is available, however parameters on MAR says it can only be given if HR < 70. Verifying hydralazine order with MD. Dr. Savage de leon.

## 2020-01-03 NOTE — DIETARY
Nutrition Services: Brief Update    Pt screened at length of stay for adequate PO intake.  Per ADL flow sheet, PO has been variable (<25-75%) of meals.  Spoke with pt at bedside.  Pt reports a fair appetite, stating baseline appetite at home is the same.  Pt reports a stable wt, stating a UBW of 150 lbs.  Obtained preferences for meals from pt, added high protein milkshake TID per pt request.    RD will continue to screen pt weekly. Consult RD as needed.

## 2020-01-03 NOTE — DISCHARGE SUMMARY
Discharge Summary    CHIEF COMPLAINT ON ADMISSION  Chief Complaint   Patient presents with   • Leg Pain       Reason for Admission  ems     CODE STATUS  Full Code    HPI & HOSPITAL COURSE  This is a 69 y.o. male here with Leg Pain  Please review Dr. Govind Bullard M.D. notes for further details of history of present illness, past medical/social/family histories, allergies and medications. Please review Dr. Valles, [facility] Surgery consultation notes.  History of PVD, vasculopathy s/p R AKA in 2014  History of HTN, DM2, HDL  Presents with Leg Pain  He also has discoloration, pulseless ness and clamminess of the L leg.  At the ED, he is afebrile, hemodynamically stable.  LE arterial Doppler:  Acute occluded left common femoral artery. No flow can be demonstrated throughout left lower extrmity. Plaque is seen in the femoral artery.   Mild leukocytosis.  Heparin gtt started. Dr. Valles, Vascular consulted.  He underwent:  1.  Left femoral artery thrombectomy  2.  Left common femoral endarterectomy with patch angioplasty  3.  Left profunda femoris endarterectomy with patch angioplasty  4.  Catheter placement left femoral artery  5.  Pelvic angiogram  For:  1.  Severe peripheral arterial disease  2.  Acute on chronic ischemia left lower extremity with rest pain  By Dr. Valles on 12/27  Followed by:  1.  Left femoral to above-knee popliteal artery bypass using 6 mm PTFE  2.  Ultrasound-guided access left common femoral artery  3.  Left lower extremity angiogram  By Dr. Valles on 1/1.  He tolerated the procedure. Surgery cleared him for discharge. According to Dr. Valles, he did not feel any antiplatelets or anticoagulation needed for PVD  History of R AKA, s/p L AKA. Will defer to primary provider if antiplatelets can be started for other indications, PT/OT evaluated and he needed rehab/skilled. He was then accepted by [facility].    Blood pressures and blood sugars are stable and canbe checked at SNF. He will resume  his blood pressure and diabetes medications. Cr- only mildly elevated and were trending down at discharge. Primary provider can obtain CBCs and BMPs at the skilled facility to monitor. He can continus statin for HPL.     At discharge date, Antonio Walker afebrile and hemodynamically stable.  Antonio Walker wanted to be discharged today.    Discharge Physical Exam  Vitals signs and nursing note reviewed.   Constitutional:       Appearance: Normal appearance.   HENT:      Head: Normocephalic.      Nose: Nose normal.      Mouth/Throat:      Mouth: Mucous membranes are moist.      Pharynx: Oropharynx is clear.   Eyes:      Pupils: Pupils are equal, round, and reactive to light.   Neck:      Musculoskeletal: Normal range of motion and neck supple.   Cardiovascular:      Rate and Rhythm: Normal rate and regular rhythm.      Pulses: Normal pulses.      Heart sounds: Normal heart sounds.   Pulmonary:      Effort: Pulmonary effort is normal.      Breath sounds: Normal breath sounds.   Abdominal:      General: Abdomen is flat. Bowel sounds are normal.   Musculoskeletal:         General:  deformity (R AKA) present. L AKA     Left lower leg: Edema present.        Legs:    Skin:     General: Skin is warm.      Capillary Refill: Capillary refill takes less than 2 seconds.      Findings: Erythema (improved.) present.   Neurological:      General: No focal deficit present.      Mental Status: He is alert. Mental status is at baseline.   Psychiatric:         Mood and Affect: Mood normal.        Imaging  DX-PORTABLE FLUORO > 1 HOUR   Final Result      Digitized intraoperative radiograph is submitted for review.  This examination is not for diagnostic purpose but for guidance during a surgical procedure.      Actual fluoroscopic time is:1 minute 24 seconds, total dose 9.275 mGy      DX-PORTABLE FLUORO > 1 HOUR   Final Result      Portable fluoroscopy utilized for 39 seconds.         INTERPRETING LOCATION: [address] [place] [address]  [place]O J[place], [id number]      US-RENAL   Final Result      No hydronephrosis. No renal calculus.      US-EXTREMITY ARTERY LOWER UNILAT LEFT   Final Result      US-RIVAS SINGLE LEVEL BILAT   Final Result        Therefore, he is discharged in good and stable condition to skilled nursing facility.    The patient met 2-midnight criteria for an inpatient stay at the time of discharge.      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 or attending physician at facility upon receipt  Follow up with Dr. Valles, Surgery in 1 week    MEDICATIONS ON DISCHARGE     Medication List      START taking these medications      Instructions   acetaminophen 325 MG Tabs  Commonly known as:  TYLENOL   Take 2 Tabs by mouth every 6 hours as needed (Mild Pain; (Pain scale 1-3); Temp greater than 100.5 F).  Dose:  650 mg     bisacodyl 10 MG Supp  Commonly known as:  DULCOLAX   Insert 1 Suppository in rectum 1 time daily as needed (if magnesium hydroxide ineffective after 24 hours).  Dose:  10 mg     magnesium hydroxide 400 MG/5ML Susp  Commonly known as:  MILK OF MAGNESIA   Take 30 mL by mouth 1 time daily as needed (if polyethylene glycol ineffective after 24 hours).  Dose:  30 mL     ondansetron 4 MG Tbdp  Commonly known as:  ZOFRAN ODT   Take 1 Tab by mouth every four hours as needed for Nausea (give PO if IV route is unavailable.).  Dose:  4 mg     oxyCODONE immediate release 10 MG immediate release tablet  Commonly known as:  ROXICODONE   Take 1 Tab by mouth every 6 hours as  needed for up to 3 days.  Dose:  10 mg     polyethylene glycol/lytes Pack  Commonly known as:  MIRALAX   Take 1 Packet by mouth 1 time daily as needed (if sennosides and docusate ineffective after 24 hours).  Dose:  17 g     senna-docusate 8.6-50 MG Tabs  Commonly known as:  PERICOLACE or SENOKOT S   Take 2 Tabs by mouth 2 Times a Day.  Dose:  2 Tab     tamsulosin 0.4 MG capsule  Start taking on:  January 4, 2020  Commonly known as:  FLOMAX   Take 1 Cap by mouth ONE-HALF HOUR AFTER BREAKFAST.  Dose:  0.4 mg        CONTINUE taking these medications      Instructions   gabapentin 300 MG Caps  Commonly known as:  NEURONTIN   Take 300 mg by mouth 3 times a day.  Dose:  300 mg     hydroCHLOROthiazide 25 MG Tabs  Commonly known as:  HYDRODIURIL   Take 25 mg by mouth every day.  Dose:  25 mg     lisinopril 40 MG tablet  Commonly known as:  PRINIVIL   Take 40 mg by mouth every day.  Dose:  40 mg     MELATONIN PO   Take 2 Tabs by mouth every bedtime.  Dose:  2 Tab     metoprolol SR 50 MG Tb24  Commonly known as:  TOPROL XL   Take 50 mg by mouth every day.  Dose:  50 mg          In prescribing controlled substances to this patient, I certify that I have obtained and reviewed the medical history of Antonio Walker. I have also made a good suzie effort to obtain applicable records from other providers who have treated the patient and records did not demonstrate any increased risk of substance abuse that would prevent me from prescribing controlled substances.     I have conducted a physical exam and documented it. I have reviewed Mr. Walker’s prescription history as maintained by the [place] Prescription Monitoring Program.     I have assessed the patient’s risk for abuse, dependency, and addiction using the validated Opioid Risk Tool available at https://www.mdcalc.com/bsyzig-szdu-eajw-ort-narcotic-abuse.     Given the above, I believe the benefits of controlled substance therapy outweigh the risks. The reasons for  prescribing controlled substances include non-narcotic, oral analgesic alternatives have been inadequate for pain control. Accordingly, I have discussed the risk and benefits, treatment plan, and alternative therapies with the patient.       Allergies  No Known Allergies    DIET  Orders Placed This Encounter   Procedures   • Diet Order Regular     Standing Status:   Standing     Number of Occurrences:   1     Order Specific Question:   Diet:     Answer:   Regular [1]       ACTIVITY  As per SNF    LINES, DRAINS, AND WOUNDS  This is an automated list. Peripheral IVs will be removed prior to discharge.  Peripheral IV 12/26/19 20 G Right Forearm (Active)   Site Assessment Clean;Dry;Intact 1/3/2020  8:11 AM   Dressing Type Transparent 1/3/2020  8:11 AM   Line Status Flushed;Saline locked 1/3/2020  8:11 AM   Dressing Status Clean;Dry;Intact 1/3/2020  8:11 AM   Dressing Intervention N/A 1/3/2020  8:11 AM   NEXT Primary Tubing Change  12/28/19 12/27/2019  8:15 AM   Infiltration Grading ([facility]) 0 1/3/2020  8:11 AM   Phlebitis Scale ([facility] Only) 0 1/3/2020  8:11 AM       Peripheral IV 12/26/19 20 G Left Hand (Active)   Site Assessment Clean;Dry;Intact 1/3/2020  8:11 AM   Dressing Type Transparent 1/3/2020  8:11 AM   Line Status Infusing 1/3/2020  8:11 AM   Dressing Status Clean;Dry;Intact 1/3/2020  8:11 AM   Dressing Intervention N/A 1/3/2020  8:11 AM   Date Primary Tubing Changed 12/31/19 1/2/2020  9:15 PM   NEXT Primary Tubing Change  01/04/20 1/2/2020  8:00 AM   Infiltration Grading ([facility]) 0 1/3/2020  8:11 AM   Phlebitis Scale ([facility] Only) 0 1/3/2020  8:11 AM      Supraglottic Airway 5 (Active)        Peripheral IV 12/26/19 20 G Right Forearm (Active)   Site Assessment Clean;Dry;Intact 1/3/2020  8:11 AM   Dressing Type Transparent 1/3/2020  8:11 AM   Line Status Flushed;Saline locked 1/3/2020  8:11 AM   Dressing Status Clean;Dry;Intact 1/3/2020  8:11 AM   Dressing Intervention N/A 1/3/2020  8:11 AM   NEXT  Primary Tubing Change  19  8:15 AM   Infiltration Grading ([facility], [facility]) 0 1/3/2020  8:11 AM   Phlebitis Scale ([facility] Only) 0 1/3/2020  8:11 AM       Peripheral IV 19 20 G Left Hand (Active)   Site Assessment Clean;Dry;Intact 1/3/2020  8:11 AM   Dressing Type Transparent 1/3/2020  8:11 AM   Line Status Infusing 1/3/2020  8:11 AM   Dressing Status Clean;Dry;Intact 1/3/2020  8:11 AM   Dressing Intervention N/A 1/3/2020  8:11 AM   Date Primary Tubing Changed 19  9:15 PM   NEXT Primary Tubing Change  20  8:00 AM   Infiltration Grading ([facility], [facility]) 0 1/3/2020  8:11 AM   Phlebitis Scale ([facility] Only) 0 1/3/2020  8:11 AM               MENTAL STATUS ON TRANSFER  Level of Consciousness: Alert  Orientation : Oriented x 4  Speech: Speech Clear    CONSULTATIONS  Vascular Surgery    PROCEDURES  Us-karen Single Level Bilat    Result Date: 2019   Vascular Laboratory  Conclusions  Right.  Monphasic waveform in the right common femoral artery.  Left  Monophasic waveform in the left common femoral artery.  Absent flow in the posterior tibial, and dorsalis pedis arteries.  There is no audible signal demonstrated on the left side.  Arterial duplex scan was performed in accordance with lower extremity  arterial evaluation protocol - see separate report.  LEONIE CHENG  Age:    69    Gender:     M  MRN:    [id number]  :    1950      BSA:  Exam Date:     2019 12:49  Room #:     Inpatient  Priority:     Stat  Ht (in):             Wt (lb):  Ordering Physician:        VICKY HOOPER  Referring Physician:       VICKY HOOPER  Sonographer:               French Nassar RDCS,                             RVT  Study Type:                Complete Unilateral  Technical Quality:         Adequate  Indications:     Pain in Limb, PVD  CPT Codes:       69151  ICD Codes:       729.5  443.9  History:         PVD, Right AKA  Limitations:                 RIGHT  Waveform            Systolic  BPs (mmHg)                             105           Brachial  Monophasic                               Common Femoral                                           Posterior Tibial                                           Dorsalis Pedis                                           Peroneal                                           RIVAS                                           TBI                       LEFT  Waveform        Systolic BPs (mmHg)                             82            Brachial  Monophasic                               Common Femoral  Absent                                   Posterior Tibial  Absent                                   Dorsalis Pedis                                           Peroneal                                           RIVAS                                           TBI  Findings  Right.  Monphasic waveform in the right common femoral artery.  Left  Monophasic waveform in the left common femoral artery.  Absent flow in the posterior tibial, and dorsalis pedis arteries.  There is no audible signal demonstrated on the left side.  Arterial duplex scan was performed in accordance with lower extremity  arterial evaluation protocol - see separate report.  Donny Key  (Electronically Signed)  Final Date:      26 December 2019                   13:25    Us-extremity Artery Lower Unilat Left    Result Date: 12/26/2019  Lower Extremity  Arterial Duplex Report  Vascular Laboratory  CONCLUSIONS  Left.         Acute occluded left common femoral artery. No flow can be demonstrated  throughout left lower extrmity.                                                   Report called  CHENG LEONIE  Exam Date:     12/26/2019 12:29  Room #:     Inpatient  Priority:     Routine  Ht (in):             Wt (lb):  Ordering Physician:        VICKY HOOPER  Referring Physician:       RUFUS Daniels  Sonographer:               French Nassar RDCS,                             RVT  Study Type:                Complete  Unilateral  Technical Quality:         Adequate  Age:    69    Gender:     M  MRN:    [id number]  :    1950      BSA:  Indications:     Pain in Limb, PVD  CPT Codes:       84022  ICD Codes:       729.5  443.9  History:         PVD, Right AKA  Limitations:                RIGHT  Waveform        Peak Systolic Velocity (cm/s)                  Prox    Prox-Mid  Mid    Mid-Dist  Distal                                                             CFA                                                             PFA                                                             SFA                                                             POP                                                             AT                                                             PT                                                             JOLLY                LEFT  Waveform        Peak Systolic Velocity (cm/s)                  Prox    Prox-Mid  Mid    Mid-Dist  Distal  Absent                                                     CFA  Absent                                                     PFA  Absent                                                     SFA  Absent                                                     POP  Absent                                                     AT  Absent                                                     PT  Absent                                                     JOLLY  FINDINGS  Left.  Acute occluded left common femoral artery. No flow can be demonstrated  throughout left lower extrmity.   Plaque is seen in the femoral artery.  Donny Key  (Electronically Signed)  Final Date:      2019                   13:29    Us-renal    Result Date: 2019 5:30 PM HISTORY/REASON FOR EXAM:  Abnormal Labs TECHNIQUE/EXAM DESCRIPTION: Renal ultrasound. COMPARISON:  None FINDINGS: The right kidney measures 8.5 cm.  The right kidney appears normal in contour and parenchymal echotexture.  The corticomedullary differentiation is preserved. The right renal collecting system is not dilated. There are no renal calculi or masses. The left kidney measures 10.7 cm. The left kidney appears normal in contour and parenchymal echotexture. The corticomedullary differentiation is preserved. The left renal collecting system is not dilated. There are no renal calculi or masses. The bladder demonstrates no focal wall abnormality.     No hydronephrosis. No renal calculus.    Dx-portable Fluoro > 1 Hour    Result Date: 1/1/2020 1/1/2020 10:10 AM Digitized Intraoperative Radiograph TECHNIQUE: 515 DSA images of the left lower extremity CLINICAL INFORMATION: Left leg angiogram COMPARISON: None available FINDINGS: Contrast opacification of the vasculature     Digitized intraoperative radiograph is submitted for review.  This examination is not for diagnostic purpose but for guidance during a surgical procedure. Actual fluoroscopic time is:1 minute 24 seconds, total dose 9.275 mGy    Dx-portable Fluoro > 1 Hour    Result Date: 12/27/2019 12/27/2019 10:30 AM HISTORY/REASON FOR EXAM:  Left iliac angiogram TECHNIQUE/EXAM DESCRIPTION AND NUMBER OF VIEWS: Portable fluoroscopy for greater than one hour in OR. FINDINGS: The portable fluoroscopy unit was obligated to the procedure for greater than one hour. Actual fluoro time was 39 seconds.     Portable fluoroscopy utilized for 39 seconds. INTERPRETING LOCATION: [address] [place] [address], [facility]    Please see Dr. Valles's note    LABORATORY  Lab Results   Component Value Date    SODIUM 136 01/03/2020    POTASSIUM 4.4 01/03/2020    CHLORIDE 106 01/03/2020    CO2 23 01/03/2020    GLUCOSE 104 (H) 01/03/2020    BUN 14 01/03/2020    CREATININE 1.55 (H) 01/03/2020        Lab Results   Component Value Date    WBC 6.8 01/03/2020    HEMOGLOBIN 8.3 (L) 01/03/2020    HEMATOCRIT 26.2 (L) 01/03/2020    PLATELETCT 199 01/03/2020        Total time of the discharge process exceeds 40 minutes.

## 2020-01-03 NOTE — DISCHARGE PLANNING
Received Choice form at 0992  Agency/Facility Name: [facility]  Referral sent per Choice form @ [phone]

## 2020-01-03 NOTE — DISCHARGE PLANNING
Anticipated Discharge Disposition: SNF-[facility]    Action: RN CM confirmed transport to [facility] at 1600; notified pt, daughter (via VM), and bedside RN.    Barriers to Discharge: None    Plan: Discharge to SNF ([facility]) at 1600.

## 2020-01-03 NOTE — CARE PLAN
Problem: Pain Management  Goal: Pain level will decrease to patient's comfort goal  Outcome: PROGRESSING AS EXPECTED  Intervention: Follow pain managment plan developed in collaboration with patient and Interdisciplinary Team  Note:   Pt's pain assessed throughout the night, prn medication given upon request and availability     Problem: Safety  Goal: Will remain free from falls  Outcome: PROGRESSING AS EXPECTED  Intervention: Assess risk factors for falls  Flowsheets  Taken 1/2/2020 0800 by Amanda Huber  Pt Calls for Assistance: Yes  Taken 1/1/2020 1148 by Italia Cleaning R.N.  History of fall: 0  Mobility Status Assessment: 2-2 Healthcare Providers Required for Assistance with Ambulation & Transfer  Note:   Pt bed is in lowest position and locked, call light within reach, pt calls for assistance

## 2020-01-03 NOTE — DISCHARGE PLANNING
Anticipated Discharge Disposition: SNF    Action: RN CM obtained choice for SNF and sent to [facility]. Pt agreeable to a short term stay at a local SNF with plans to return to daughter's home or his own apartment when discharged. Sister Liza was informed of discharge plan per pt request and was agreeable with plan for SNF.    Barriers to Discharge: SNF acceptance pending.    Plan: Await SNF acceptance for discharge.

## 2020-01-03 NOTE — PROGRESS NOTES
Vascular Surgery     Awake alert  Foot warm and well perfused     Incisions look good     Physical therapy   OK to disposition when able     Follow     Gage Valles MD

## 2020-01-03 NOTE — DISCHARGE INSTRUCTIONS
Discharge Instructions    Discharged to other by medical transportation with escort. Discharged via wheelchair, hospital escort: Yes.  Special equipment needed: Not Applicable    Be sure to schedule a follow-up appointment with your primary care doctor or any specialists as instructed.     Discharge Plan:   Influenza Vaccine Indication: Not indicated: Previously immunized this influenza season and > 8 years of age    I understand that a diet low in cholesterol, fat, and sodium is recommended for good health. Unless I have been given specific instructions below for another diet, I accept this instruction as my diet prescription.   Other diet: regular    Special Instructions: None    · Is patient discharged on Warfarin / Coumadin?   No     Depression / Suicide Risk    As you are discharged from this [facility] facility, it is important to learn how to keep safe from harming yourself.    Recognize the warning signs:  · Abrupt changes in personality, positive or negative- including increase in energy   · Giving away possessions  · Change in eating patterns- significant weight changes-  positive or negative  · Change in sleeping patterns- unable to sleep or sleeping all the time   · Unwillingness or inability to communicate  · Depression  · Unusual sadness, discouragement and loneliness  · Talk of wanting to die  · Neglect of personal appearance   · Rebelliousness- reckless behavior  · Withdrawal from people/activities they love  · Confusion- inability to concentrate     If you or a loved one observes any of these behaviors or has concerns about self-harm, here's what you can do:  · Talk about it- your feelings and reasons for harming yourself  · Remove any means that you might use to hurt yourself (examples: pills, rope, extension cords, firearm)  · Get professional help from the community (Mental Health, Substance Abuse, psychological counseling)  · Do not be alone:Call your Safe Contact- someone whom you trust who  will be there for you.  · Call your local CRISIS HOTLINE 339-0217 or 903-513-9779  · Call your local Children's Mobile Crisis Response Team [place] (658) 663-2658 or www.Akella  · Call the toll free National Suicide Prevention Hotlines   · National Suicide Prevention Lifeline 465-506-YTPL (2309)  · [facility] Line Network 800-SUICIDE (697-6013)    Angiogram, Angioplasty, or Stent Placement  Care After  One of the following procedures was done today.  ANGIOGRAM:  A catheter was placed through the blood vessel in your groin, contrast was injected into the vessels, and pictures were taken.  ANGIOPLASTY:  A catheter was placed through the blood vessel in your groin and directed to an area of blocked blood flow. A balloon, and possibly a metal stent were used to open the blockage. If no other blockages are present below this area, your symptoms should improve. If blockages are present below this area, surgery may still be necessary.  STENT:  A catheter was placed in your groin through which a metal mesh tube was placed in a narrowed part of the artery to facilitate blood flow.  You were given intravenous sedation. These medications are rapidly cleared from your bloodstream. You may feel some discomfort at the insertion site after the local anesthetic wears off. This discomfort should gradually improve over the next several days.  · Only take over-the-counter or prescription medicines for pain, discomfort, or fever as directed by your caregiver.  · Complications are very uncommon after this procedure. Go to the nearest emergency department if you develop any of the following symptoms:  · Worsening pain.  · Bleeding.  · Swelling at the puncture site.  · Lightheadedness.  · Dizziness or fainting.  · Fever or chills.  · If oozing, bleeding, or a lump appears at the puncture site, apply firm pressure directly to the site steadily for 15 minutes and go to the emergency department.  · Keep the skin around the  insertion site dry. You may take showers after 24 hours. If the area does get wet, dry the skin completely. Avoid baths until the skin puncture site heals, usually 5 to 7 days.  · Development of redness, increased soreness, or swelling may be signs of a skin infection. Contact your physician.  · Rest for the remainder of the day and avoid any heavy lifting (more than 10 pounds or 4.5 kg). Do not operate heavy machinery, drive, or make legal decisions for the first 24 hours after the procedure. Have a responsible person drive you home.  · You may resume your usual diet after the procedure. Avoid alcoholic beverages for 24 hours after the procedure.  Document Released: 12/18/2006 Document Revised: 03/11/2013 Document Reviewed: 10/17/2007  [email or web address]® Patient Information ©2014 [email or web address].

## 2020-01-03 NOTE — DISCHARGE PLANNING
Agency/Facility Name: [facility]  Spoke To: Liza  Outcome: Bed available for patient today.    Received Transport Form @ 7547  Spoke to Flo @ [facility]    Transport is scheduled for 1/3 @1600 going to [facility].     Liza @ [facility] aware. LUIS FERNANDO Hendricks aware.

## 2020-01-03 NOTE — PROGRESS NOTES
Bedside report received.  Assessment complete.  A&O x 4. Patient calls appropriately.  Patient mobilizes with one person assist. Bed alarm on.   Patient has 5/10 pain. Medicated per mar.  Denies N&V. Tolerating regular diet.  Surgical site to left groin and leg with prevena and dressing CDI.  + void, + flatus, + BM.  Patient denies SOB.  SCD's off.  Review plan with of care with patient. Call light and personal belongings with in reach. Hourly rounding in place. All needs met at this time.

## 2020-01-03 NOTE — PROGRESS NOTES
[facility] Medicine Daily Progress Note    Date of Service  1/2/2020    Chief Complaint  69 y.o. male admitted 12/26/2019 with pain in left lower extremity    Hospital Course    Mr. Walker is a 69-year-old male with a PMH of PAD, right AKA in 2014, HTN, tobacco use, DM 2, HDL, who presented to ER on 12/2619 due to increasing pain and discoloration on his left lower extremity which started about a week ago.  Patient reports that pain and discoloration is started on his left heel then became more painful into the forefoot and toes.  Due to his right AKA, patient ambulates with a wheelchair only.  Patient also notes that he recently moved from the [facility] to [facility].  Patient also reports that he has has a history of liver and kidney disease which apparently resolved prior to admission.  Patient stop smoking approximately 6 years ago.  During evaluation in ER, patient found to have an acute on chronic left lower extremity arterial occlusion starting from the femoral area.  Patient started on a Heparin gtt, and vascular surgery, Dr. Valles, was consulted.  Patient denies any chest pain, no palpitations, and denies any prior heart history.  Patient was admitted into the general surgical unit.      Interval Problem Update  12/27/19:  Patient seen and examined this morning prior to procedure.  Right AKA noted, left lower extremity very tender to touch, discoloration from heel to calf.  Patient noted to have a procedure with vascular surgeon, Dr. Valles, for a left lower angiogram with possible intervention.  Patient kept n.p.o., Heparin gtt protocol in place with clarification from surgeon when to stop drip prior to procedure.  Otherwise, patient has no concerns at this time.  Pending results from a vascular procedure today.  Patient hyponatremic at 126, glucose at 347 which may be due to n.p.o. status and D5W infusion.  Creatinine at 2.58 and BUN at 55, which more likely be contributed from dehydration.  Will  monitor and treat after vascular procedure today.  12/28/19: Patient seen and examined today with no overnight episodes.  Patient reports tolerating the procedure with vascular surgery yesterday 12/27/2019.  Patient still complains of pain in the left lower extremity.  No palpable pulses palpated over dorsalis pedis and posterior tibial.  Doppler machine was also utilized on these pulse point was on the foot, no pulses heard with Doppler machine.  Vascular surgeon, Dr. Valles aware and guarded for any immediate intervention at this time as patient is POD#1 from left femoral thromboendarterectomy and profundoplasty.  Per vascular surgery, patient will be observed for a couple of days, if pain persist with no resolution, will consider tibial bypass versus amputation.  Patient notified and plan of care.  Patient will also be started on Flomax.  Bladder scan will be obtained with possible discontinuation of Kinney tomorrow.  Otherwise, laboratory work from this morning is unremarkable with no significant changes from previous results.  There are no significant changes from my previous ROS and PE from yesterday 12/27, please see note for further details.  Vital signs stable.  12/29/19: Patient seen and examined today with no overnight episodes.  Patient still complains of some foot pain, which is more likely reperfusion pain.  Patient had undergone an angioplasty with vascular surgeon, Dr. Valles yesterday 12/28/2019.  Dorsal pedis and posterior tibial pulses auscultated with the use of Doppler.  Left foot warm to touch, but .  Per recommendations of vascular surgeon, we will continue to monitor.  Bladder trial to start today, if patient tolerates Kinney will be D/C'd.  Notified bedside RN.  Otherwise, there are no significant changes from my previous ROS or PE.  Lab work from this morning was unremarkable with no significant changes from previous results.  VSS overnight.  12/30: Patient seen and examined today  with no overnight episodes.  Patient still complains of left foot pain.  Successful bladder trial yesterday 12/29, Gregoria KUMAR'shree, patient voiding.  Per vascular surgeon, Dr. Valles, patient to go back on Wednesday, 1/1/2024 angiogram of the left foot possible intervention due to recurrent pain.  Patient notified and plan of care.  Pain management in place.  ADRIA resolved with IVF therapy, we will continue to monitor.  There are no significant changes in my previous ROS or PE from previous note, please see previous note for further details.  Otherwise, lab work when this morning was unremarkable with no significant changes from previous results.  VSS overnight    12/31. Slightly anxious, he is hoping his L leg could be saved. He had R AKA already.  Currently he is planned for CT Angiography of his L leg for revision as he had LLE endarterectomy already  Options given to him include bypass for distal circulation, distal endarterectomy versus amputation  1/1. Plan for CT angiography in the OR today  1/2. Tolerated bypass procedure. Patient no new issues or complaints. He says painis better..    Consultants/Specialty  -Vascular surgery -Gage aVlles    Code Status  Full    Disposition  He is open to skilled  1/2. PT/OT ordered pending.  Surgery clearance, Dr. Valles mentions monitor for a couple of days.    Review of Systems  Review of Systems   Constitutional: Negative.  Negative for chills and fever.   HENT: Negative.    Eyes: Negative.    Respiratory: Negative.    Cardiovascular: Negative.    Gastrointestinal: Negative.    Genitourinary: Negative.    Musculoskeletal: Positive for myalgias.   Skin: Positive for itching.   Neurological: Negative.    Endo/Heme/Allergies: Negative.    Psychiatric/Behavioral: Negative.         Physical Exam  Temp:  [36.1 °C (97 °F)-37.4 °C (99.3 °F)] 36.1 °C (97 °F)  Pulse:  [71-93] 71  Resp:  [16-18] 16  BP: (139-175)/(51-80) 139/58  SpO2:  [90 %-98 %] 91 %    Physical Exam  Vitals signs and  nursing note reviewed.   Constitutional:       Appearance: Normal appearance.   HENT:      Head: Normocephalic.      Nose: Nose normal.      Mouth/Throat:      Mouth: Mucous membranes are moist.      Pharynx: Oropharynx is clear.   Eyes:      Pupils: Pupils are equal, round, and reactive to light.   Neck:      Musculoskeletal: Normal range of motion and neck supple.   Cardiovascular:      Rate and Rhythm: Normal rate and regular rhythm.      Pulses: Normal pulses.      Heart sounds: Normal heart sounds.   Pulmonary:      Effort: Pulmonary effort is normal.      Breath sounds: Normal breath sounds.   Abdominal:      General: Abdomen is flat. Bowel sounds are normal.   Musculoskeletal:         General: Tenderness (LLE, also cool, thready pulses, unchanged) and deformity (R AKA) present.      Left lower leg: Edema present.        Legs:    Skin:     General: Skin is warm.      Capillary Refill: Capillary refill takes less than 2 seconds.      Findings: Erythema (improved.) present.   Neurological:      General: No focal deficit present.      Mental Status: He is alert. Mental status is at baseline.   Psychiatric:         Mood and Affect: Mood normal.         Fluids    Intake/Output Summary (Last 24 hours) at 1/2/2020 1731  Last data filed at 1/2/2020 1105  Gross per 24 hour   Intake 2408 ml   Output 1075 ml   Net 1333 ml       Laboratory  Recent Labs     12/31/19  0524 01/02/20  0407   WBC 5.4 7.9   RBC 3.16* 3.11*   HEMOGLOBIN 9.6* 9.6*   HEMATOCRIT 30.1* 29.1*   MCV 95.3 93.6   MCH 30.4 30.9   MCHC 31.9* 33.0*   RDW 48.6 48.0   PLATELETCT 207 238   MPV 10.0 10.2     Recent Labs     12/31/19  0524 01/02/20  0407   SODIUM 137 136   POTASSIUM 4.2 4.3   CHLORIDE 107 105   CO2 23 22   GLUCOSE 83 98   BUN 15 16   CREATININE 1.49* 1.63*   CALCIUM 8.6 8.2*                   Imaging  DX-PORTABLE FLUORO > 1 HOUR   Final Result      Digitized intraoperative radiograph is submitted for review.  This examination is not for  diagnostic purpose but for guidance during a surgical procedure.      Actual fluoroscopic time is:1 minute 24 seconds, total dose 9.275 mGy      DX-PORTABLE FLUORO > 1 HOUR   Final Result      Portable fluoroscopy utilized for 39 seconds.         INTERPRETING LOCATION: [address] [facility], [place], [address]      US-RENAL   Final Result      No hydronephrosis. No renal calculus.      US-EXTREMITY ARTERY LOWER UNILAT LEFT   Final Result      US-RIVAS SINGLE LEVEL BILAT   Final Result           Assessment/Plan  * Arterial occlusion due to arteriosclerosis  Assessment & Plan  -Of the left lower extremity, no dopplerable pulses, abnormal vascular study with occlusion in the femoral area  -Patient referred to vascular surgery, plan for intervention 12/27/19 with Dr. Valles  -Heparin drip - off prior to surgery on 12/27  -12/28: POD#1 from left femoral thromboendarterectomy and profundoplasty.  -Pain control  -Per Vascular Surgery, pt to go back to OR for LEFT LE angiogram and possible intervention  12/31. Anticipating LLE CT angiography for tomorrow.  Reassured patient.  1/1. Plan for CT angiography today.  1/2. S/p:  1.  Left femoral to above-knee popliteal artery bypass using 6 mm PTFE  2.  Ultrasound-guided access left common femoral artery  3.  Left lower extremity angiogram  For:  1.  Peripheral arterial disease  2.  Left lower extremity rest pain  By:  Dr. Valles, 1/1.  Tolerated procedure  May need SNF    ADRIA (acute kidney injury) (HCC)  Assessment & Plan  -With unknown baseline, the patient stated that he had CKD in the past but it resolved  -Attempt to correct with bicarb drip, follow closely, ultrasound of the renal structures  -Cr. 2.58 ; will recheck and treat as needed  -12/28: Cr 2.06  -12/30 Cr 1.42    Critical lower limb ischemia  Assessment & Plan  -Left lower extremity, plan for attempted revascularization with Dr. Valles 12/27  -12/28:  POD#1 from left femoral thromboendarterectomy and profundoplasty.  -12/28: no  pulses palpated or auscultated with doppler over dorsalis pedus and posterior tibial points - Surgeon aware  -12/28: taken back to surgery; LEFT femoral artery angioplasty   -12/29: dorsal pedis and posterior tibial pulses auscultated with doppler  12/31. CT Angiography of LLE planned tomorrow.    History of tobacco abuse  Assessment & Plan  -Extensive, the patient apparently quit 6 years ago    S/P AKA (above knee amputation), right ([facility])  Assessment & Plan  -Secondary to vascular complication right lower extremity  -AKA done in [facility] in 2014    Essential hypertension  Assessment & Plan  -History of, control multimodality and adjust as indicated    Dyslipidemia  Assessment & Plan  -On statin, continue    PAD (peripheral artery disease) ([facility])  Assessment & Plan  -History of with right lower extremity amputation 2014   -history of extensive tobacco abuse       VTE prophylaxis: Heparin SQ; defer to Vascular Surgery re: anticoagulation.    Reviewed vitals, labs, imaging, staff notes.  Discussed assessment and plan with Antonio Walker   Discussed with LUIS FERNANDO

## 2020-01-03 NOTE — DISCHARGE PLANNING
Agency/Facility Name: [facility]   Spoke To: Gillian  Outcome: Patient accepted.     Agency/Facility Name: [facility]  Spoke To: Carol  Outcome: Patient accepted.     Agency/Facility Name: [facility]  Spoke To: Galina  Outcome: Patient accepted.     [facility]  Spoke To: Micheal  Outcome: Patient accepted.     Agency/Facility Name: [facility]  Outcome: Notification via Epic - patient accepted.

## 2020-03-29 NOTE — NUR
PIV PLACED. MEDS ADMIN AS PER MAR. PT CONNECTED TO MONITORING. PT RESTING ON 
[employer] WATCHING TV. YIN.

## 2020-03-29 NOTE — NUR
bp improved. holding nitro paste. baldev aware. morphine for pain. trop neg. 
admit orders. 10/10 chest/epigastric pain to 5/10 after nitro. call bell in 
reach. as

## 2020-03-29 NOTE — NUR
PT C/O CHEST PAIN/EPIGASTRIC PAIN. METOP PER MAR FOR BP. 1 NITRO TAB PER MAR. 
SISTER UPDATED, PT TBADM. SB SR ON MONITOR 59-64. AS

## 2020-07-04 PROBLEM — E87.1 HYPONATREMIA: Status: ACTIVE | Noted: 2020-01-01

## 2020-07-04 PROBLEM — E87.5 HYPERKALEMIA: Status: ACTIVE | Noted: 2020-01-01

## 2020-07-05 PROBLEM — N18.30 ACUTE RENAL FAILURE SUPERIMPOSED ON STAGE 3 CHRONIC KIDNEY DISEASE (HCC): Status: ACTIVE | Noted: 2019-01-01

## 2020-07-05 NOTE — PROGRESS NOTES
[facility] Medicine Daily Progress Note    Date of Service  7/5/2020    Chief Complaint  70 y.o. male admitted 7/4/2020 with left leg pain      Interval Problem Update  Left leg pain, throbbing, intensity 8/10, constant, no radiation    On iv heparin    Angiogram today    npo    Cr 2.1    Consultants/Specialty  Vascular dr leon    Code Status  full    Disposition  pending    Review of Systems  Review of Systems   Constitutional: Positive for malaise/fatigue. Negative for chills and fever.   HENT: Negative for ear discharge, ear pain, hearing loss and tinnitus.    Eyes: Negative for blurred vision, double vision, photophobia and pain.   Respiratory: Negative for cough, hemoptysis, sputum production and shortness of breath.    Cardiovascular: Negative for chest pain, palpitations and orthopnea.   Gastrointestinal: Negative for nausea and vomiting.   Genitourinary: Negative for dysuria, frequency and urgency.   Musculoskeletal: Positive for myalgias.   Neurological: Negative for dizziness, tremors and headaches.   Psychiatric/Behavioral: Negative for depression, substance abuse and suicidal ideas.        Physical Exam  Temp:  [36.4 °C (97.6 °F)-37.2 °C (99 °F)] 36.7 °C (98.1 °F)  Pulse:  [52-82] 52  Resp:  [16-20] 16  BP: (147-192)/(61-91) 147/61  SpO2:  [87 %-99 %] 99 %    Physical Exam  Constitutional:       General: He is not in acute distress.     Appearance: He is ill-appearing. He is not toxic-appearing or diaphoretic.   HENT:      Head: Normocephalic and atraumatic.      Mouth/Throat:      Mouth: Mucous membranes are dry.   Eyes:      General: No scleral icterus.     Extraocular Movements: Extraocular movements intact.      Pupils: Pupils are equal, round, and reactive to light.   Neck:      Musculoskeletal: Normal range of motion and neck supple.   Cardiovascular:      Rate and Rhythm: Normal rate and regular rhythm.      Heart sounds: No murmur. No gallop.    Pulmonary:      Effort: Pulmonary effort is  normal. No respiratory distress.      Breath sounds: No stridor. No wheezing, rhonchi or rales.   Chest:      Chest wall: No tenderness.   Abdominal:      General: Abdomen is flat. There is no distension.      Palpations: There is no mass.      Tenderness: There is no abdominal tenderness. There is no left CVA tenderness or rebound.      Hernia: No hernia is present.   Musculoskeletal: Normal range of motion.      Comments: Right AKA    Left leg is dusky, cyanotic   Skin:     Capillary Refill: Capillary refill takes more than 3 seconds.   Psychiatric:         Mood and Affect: Mood normal.         Fluids    Intake/Output Summary (Last 24 hours) at 7/5/2020 1004  Last data filed at 7/5/2020 0600  Gross per 24 hour   Intake 686 ml   Output 1100 ml   Net -414 ml       Laboratory  Recent Labs     07/04/20 2014 07/05/20  0321   WBC 7.7 8.1   RBC 3.19* 3.25*   HEMOGLOBIN 9.9* 10.0*   HEMATOCRIT 29.4* 30.8*   MCV 92.2 94.8   MCH 31.0 30.8   MCHC 33.7 32.5*   RDW 51.8* 53.1*   PLATELETCT 188 163*   MPV 10.7 11.4     Recent Labs     07/04/20 2014 07/05/20  0321   SODIUM 125* 128*   POTASSIUM 5.5 5.1   CHLORIDE 97 101   CO2 14* 16*   GLUCOSE 72 67   BUN 52* 49*   CREATININE 2.43* 2.10*   CALCIUM 8.5 8.2*     Recent Labs     07/04/20 2014 07/05/20  0321   APTT 35.5 >240.0*   INR 1.03  --                Imaging  US-RENAL   Final Result      Unremarkable renal ultrasound.      US-EXTREMITY ARTERY LOWER UNILAT LEFT   Final Result      IR-EXTREMITY ANGIOGRAM-UNILATERAL LEFT    (Results Pending)        Assessment/Plan  * Critical lower limb ischemia- (present on admission)  Assessment & Plan  -main diagnosis, likely acute occlusion of the L femoral bypass  Surgery pending    Iv heparin    Pain control    Acute renal failure superimposed on stage 3 chronic kidney disease (HCC)- (present on admission)  Assessment & Plan  -2/2 dehydration?  -baseline CKD stage III around 1.5  -IVF's  -check bladder scan, renal US  -monitor  uop  -hold outpatient nephrotoxic agents, optimize blood pressure    S/P AKA (above knee amputation), right (HCC)- (present on admission)  Assessment & Plan  -appears well healed    Essential hypertension- (present on admission)  Assessment & Plan  -somewhat elevated given concurrent pain  -hold outpatient HCTZ and ACE given impaired kidney function  -monitor closely  prn IV hydralazine PRN    Dyslipidemia- (present on admission)  Assessment & Plan  -statin    PAD (peripheral artery disease) (HCC)- (present on admission)  Assessment & Plan  -severe, US pending  -appears to have re-occlusion of femoral bypass on the LLE  -critical limb ischemia, vascular surgery consulted  -weight based heparin gtt  -going to the OR today  -IV fentanyl and Oral oxy IR PRN pain  -monitor blood pressure,    Hyperkalemia- (present on admission)  Assessment & Plan  -mild, 2/2 Acute on CKD Stage III  -[place] x1 and monitor on tele, trend daily    Hyponatremia- (present on admission)  Assessment & Plan  -2/2 hypovolemia presumed  -start IVF's with NS and correct slowly, daily NA+       VTE prophylaxis: heparin    Check am cbc, bmp

## 2020-07-05 NOTE — RESPIRATORY CARE
COPD EDUCATION by COPD CLINICAL EDUCATOR  7/5/2020 at 9:59 AM by Liza Machado, RRT     Patient reviewed by COPD education team. Patient does not have a history or diagnosis of COPD and is a non-smoker, therefore does not qualify for the COPD program.

## 2020-07-05 NOTE — ASSESSMENT & PLAN NOTE
-severe, US pending  -appears to have re-occlusion of femoral bypass on the LLE  -critical limb ischemia, vascular surgery consulted, Dr. Culp  -IV fentanyl and Oral oxy IR PRN pain  -monitor blood pressure  Status post angiogram and angioplasty with stent placement 7/6.  Continue aspirin, clopidogrel, rivaroxaban(holding due to heparin infusion)

## 2020-07-05 NOTE — ED PROVIDER NOTES
"ED Provider Note  CHIEF COMPLAINT  Chief Complaint   Patient presents with   • Leg Pain     left leg       HPI  Antonio Walker is a 70 y.o. male who presents for evaluation of left lower extremity pain for 3 days.  Patient states the pain started 3 days ago rather suddenly and has been persistent.  It is worse when he walks.  Extends from his mid thigh to his foot and is worse at the ankle.  He notes no swelling or redness.  Patient has a past medical history of severe peripheral vascular disease and a right above-the-knee amputation several years ago for the same issues.  He also notes he recently had a \"bypass\" of the left lower extremity in early January of this year for this same issue.    REVIEW OF SYSTEMS  Constitutional: No fevers or chills  Skin: No rashes, abrasions, lacerations, or pruritus  HEENT: No sore throat, runny nose, sores, trouble swallowing, trouble speaking.  Chest: No pain  Pulm: No shortness of breath, cough, wheezing, stridor, or pain with inspiration/expiration  Gastrointestinal: No nausea, vomiting, diarrhea, or pain.  Genitourinary: No dysuria or hematuria  Musculoskeletal: No recent trauma.  Left lower extremity pain  Neurologic: No significant sensory or motor changes but is painful to move the left lower extremity. No confusion or disorientation.  Heme: No bleeding or bruising problems.   Immuno: No hx of recurrent infections      PAST MEDICAL HISTORY   PVD with right AKA, type 2 diabetes, hypertension tobacco abuse    SOCIAL HISTORY  Social History     Tobacco Use   • Smoking status: Former Smoker     Packs/day: 0.00   • Smokeless tobacco: Never Used   Substance and Sexual Activity   • Alcohol use: Never     Frequency: Never   • Drug use: Never   • Sexual activity: Not on file       SURGICAL HISTORY   has a past surgical history that includes femoral endarterectomy (Left, 12/27/2019); angiogram (Left, 12/27/2019); angiogram (1/1/2020); and femoral tibial bypass (Left, " "1/1/2020).    CURRENT MEDICATIONS  Home Medications     Reviewed by David Barr (Pharmacy Tech) on 07/04/20 at 2317  Med List Status: Complete   Medication Last Dose Status   gabapentin (NEURONTIN) 300 MG Cap 7/4/2020 Active   hydroCHLOROthiazide (HYDRODIURIL) 25 MG Tab 7/4/2020 Active   metoprolol SR (TOPROL XL) 50 MG TABLET SR 24 HR 7/4/2020 Active   tamsulosin (FLOMAX) 0.4 MG capsule 7/4/2020 Active                ALLERGIES  No Known Allergies    PHYSICAL EXAM  VITAL SIGNS: BP (!) 192/85   Pulse 70   Temp 37.2 °C (99 °F) (Temporal)   Resp 19   Ht 1.727 m (5' 8\")   Wt 65.8 kg (145 lb)   SpO2 91%   BMI 22.05 kg/m²    Gen: Appears frail, otherwise no apparent distress  HEENT: No signs of trauma, Bilateral external ears normal, Nose normal. Conjunctiva normal, Non-icteric.    Cardiovascular: Regular rate and rhythm..  Capillary refill less than 3 seconds to upper extremities with 2+ radial pulses.  Left lower extremity is warm to the touch with no significant erythema or edema.  Patient has 2+ left femoral pulse but nonpalpable popliteal, dorsalis pedis, or posterior tibial.    Thorax & Lungs: Normal breath sounds, No respiratory distress, No wheezing bilateral chest rise  Abdomen: Bowel sounds normal, Soft, No tenderness, No masses, No pulsatile masses. No Guarding or rebound  Skin: Warm, Dry, No erythema, No rash noted to exposed areas.    Extremities: Pulseless left lower extremity as described above.  No significant edema to left lower extremity.  Right lower extremity AKA.  Patient able to wiggle toes on the left and has sensation to light touch although he feels it is diminished somewhat.  Neurologic: Alert , no facial droop, grossly normal coordination and strength to upper extremities.  Patient able to flex at hip, knee, and ankle albeit limited due to pain.  He has no tense muscle compartments but is tender to palpation from the mid thigh to the toes.    Psychiatric: Affect normal, Judgment " normal, Mood normal.     LABS  Results for orders placed or performed during the hospital encounter of 07/04/20   CBC WITH DIFFERENTIAL   Result Value Ref Range    WBC 7.7 4.8 - 10.8 K/uL    RBC 3.19 (L) 4.70 - 6.10 M/uL    Hemoglobin 9.9 (L) 14.0 - 18.0 g/dL    Hematocrit 29.4 (L) 42.0 - 52.0 %    MCV 92.2 81.4 - 97.8 fL    MCH 31.0 27.0 - 33.0 pg    MCHC 33.7 33.7 - 35.3 g/dL    RDW 51.8 (H) 35.9 - 50.0 fL    Platelet Count 188 164 - 446 K/uL    MPV 10.7 9.0 - 12.9 fL    Neutrophils-Polys 62.00 44.00 - 72.00 %    Lymphocytes 25.30 22.00 - 41.00 %    Monocytes 8.30 0.00 - 13.40 %    Eosinophils 3.50 0.00 - 6.90 %    Basophils 0.60 0.00 - 1.80 %    Immature Granulocytes 0.30 0.00 - 0.90 %    Nucleated RBC 0.00 /100 WBC    Neutrophils (Absolute) 4.79 1.82 - 7.42 K/uL    Lymphs (Absolute) 1.95 1.00 - 4.80 K/uL    Monos (Absolute) 0.64 0.00 - 0.85 K/uL    Eos (Absolute) 0.27 0.00 - 0.51 K/uL    Baso (Absolute) 0.05 0.00 - 0.12 K/uL    Immature Granulocytes (abs) 0.02 0.00 - 0.11 K/uL    NRBC (Absolute) 0.00 K/uL   COMP METABOLIC PANEL   Result Value Ref Range    Sodium 125 (L) 135 - 145 mmol/L    Potassium 5.5 3.6 - 5.5 mmol/L    Chloride 97 96 - 112 mmol/L    Co2 14 (L) 20 - 33 mmol/L    Anion Gap 14.0 7.0 - 16.0    Glucose 72 65 - 99 mg/dL    Bun 52 (H) 8 - 22 mg/dL    Creatinine 2.43 (H) 0.50 - 1.40 mg/dL    Calcium 8.5 8.5 - 10.5 mg/dL    AST(SGOT) 28 12 - 45 U/L    ALT(SGPT) 23 2 - 50 U/L    Alkaline Phosphatase 123 (H) 30 - 99 U/L    Total Bilirubin 0.3 0.1 - 1.5 mg/dL    Albumin 3.8 3.2 - 4.9 g/dL    Total Protein 6.6 6.0 - 8.2 g/dL    Globulin 2.8 1.9 - 3.5 g/dL    A-G Ratio 1.4 g/dL   LACTIC ACID   Result Value Ref Range    Lactic Acid 1.0 0.5 - 2.0 mmol/L   APTT   Result Value Ref Range    APTT 35.5 24.7 - 36.0 sec   PROTHROMBIN TIME (INR)   Result Value Ref Range    PT 13.8 12.0 - 14.6 sec    INR 1.03 0.87 - 1.13   COVID/SARS CoV-2 PCR    Specimen: Nasopharyngeal; Respirate   Result Value Ref Range    COVID  Order Status Received    ESTIMATED GFR   Result Value Ref Range    GFR If  32 (A) >60 mL/min/1.73 m 2    GFR If Non  27 (A) >60 mL/min/1.73 m 2   FERRITIN   Result Value Ref Range    Ferritin 57.3 22.0 - 322.0 ng/mL   IRON/TOTAL IRON BIND   Result Value Ref Range    Iron 60 50 - 180 ug/dL    Total Iron Binding 289 250 - 450 ug/dL    Unsat Iron Binding 229 110 - 370 ug/dL    % Saturation 21 15 - 55 %   SARS-CoV-2, PCR (In-House)   Result Value Ref Range    SARS-CoV-2 Source NP Swab        RADIOLOGY  US-EXTREMITY ARTERY LOWER UNILAT LEFT   Final Result      IR-EXTREMITY ANGIOGRAM-UNILATERAL LEFT    (Results Pending)   US-RENAL    (Results Pending)       HYDRATION: Based on the patient's presentation of Dehydration the patient was given IV fluids. IV Hydration was used because oral hydration was not adequate alone. Upon recheck following hydration, the patient was stable.    Critical Care Note  Upon my evaluation, this patient had high probability of imminent and life-threatening deterioration due to pulseless left lower extremity with limb ischemia, acute kidney injury, which required my direct attention, intervention, and personal management. I personally provided 35 minutes of critical care time exclusive of time spent on separately billable procedures. Time includes review of laboratory data, radiology results, discussion with consultants, and monitoring for potential decompensation.     COURSE & MEDICAL DECISION MAKING  8:22 PM  Finished evaluating patient at bedside for initial exam.  Patient has a pulseless left leg and has a above-the-knee amputation on the right due to PVD in the past.  Patient states the pain was sudden onset 3 days ago and has been consistent from the mid thigh to the ankle.  It is worse at the ankle.  I am unable to Doppler pulses or palpate pulses in either the posterior tibial or dorsalis pedis.  We will contact vascular surgeon for direction.    9  PM  Discussed the case with the on-call vascular surgeon, Dr. Santillan, who recommended heparinization and admission to the hospitalist service.  He will remain n.p.o. after midnight and will be evaluated by arterial duplex tonight.  As the patient is able to wiggle his toes and has sensation to light touch, is likely surgery can be safely postponed until tomorrow.  Case was discussed with the pharmacist who will initiate heparinization.  We will also discussed the case with the hospitalist for admission.    FINAL IMPRESSION  1.  Acute on chronic left lower extremity ischemia  2.  Acute kidney injury  Electronically signed by: Wilberto Negrete M.D., 7/4/2020 8:17 PM

## 2020-07-05 NOTE — PROGRESS NOTES
Received patient from ED with bedside report from Michelle Beard RN. A&Ox 4. Placed on telemetry monitor and monitor tech notified. Pt in sinus rhythm with HR in the 60s. Pt able to scoot self over from gurney to bed. Oriented patient to room and explained pt personalized plan of care. Educated patient on safety and use of call light. Bed in low position and bed exit alarm set on. Call light in reach.

## 2020-07-05 NOTE — PROGRESS NOTES
Vascular    Notified of left leg pain for the past 3 days.  Patient is able to move his foot and can feel sensation in the foot.    Most likely his femoral to popliteal bypass graft has occluded.  Waiting for arterial duplex to confirm this.    Plan:  -Heparin weight-based protocol  -N.p.o. except medications after midnight  -Plan to perform a left lower extremity angiogram and possible thrombolysis tomorrow morning    Eligio Culp MD  [facility] (General and Vascular Surgery)  Cell: [phone] (text or call is fine, if you don't reach me please try my office)  Office: [phone]  __________________________________________________________________  Patient:Antonio Walker   MRN:[id number]   CSN:[id number]    7/4/2020    9:02 PM

## 2020-07-05 NOTE — H&P
Hospital Medicine History & Physical Note    Date of Service  7/4/2020    Primary Care Physician  Amy Almaraz M.D.    Code Status  Full Code    Chief Complaint  Chief Complaint   Patient presents with   • Leg Pain     left leg       History of Presenting Illness  70 y.o. male who presented 7/4/2020 with above medical issues.  Patient has a history of severe peripheral arterial disease and has had prior femoral artery bypass occlusions most recently in January 2020.  Patient comes in with acute onset of leg pain for the last 3 days that progressively got worse in the last day to the point where it is a 10 out of 10 and described as a charley horse but with no associated cramping and the pain starts in the mid thigh goes all the way distal to the feet and toes.  Says he also has associated weakness and numbness in did not take any pain medications for it at home and IV pain medications received in the emergency room has had little benefit.  Also dorsal some mild diarrhea decreased urination but no blood in the urine and no blood in the stool and also endorses that he does drink quite a bit of water but denies any chest pain palpitations fevers chills nausea or vomiting.    In the emergency department Dr. BIGG brown vascular surgery was consulted and the patient will go to the operating room tomorrow and is currently on a weightbase heparin drip.    Review of Systems  Review of Systems   Constitutional: Positive for malaise/fatigue. Negative for chills and fever.   Respiratory: Negative for cough and shortness of breath.    Cardiovascular: Positive for leg swelling.   Gastrointestinal: Negative for abdominal pain, nausea and vomiting.   Musculoskeletal: Positive for joint pain and myalgias.   All other systems reviewed and are negative.      Past Medical History   has no past medical history on file.    Surgical History   has a past surgical history that includes femoral endarterectomy (Left, 12/27/2019);  angiogram (Left, 12/27/2019); angiogram (1/1/2020); and femoral tibial bypass (Left, 1/1/2020).     Family History  family history is not on file.     Social History   reports that he has quit smoking. He smoked 0.00 packs per day. He has never used smokeless tobacco. He reports that he does not drink alcohol or use drugs.    Allergies  No Known Allergies    Medications  Prior to Admission Medications   Prescriptions Last Dose Informant Patient Reported? Taking?   MELATONIN PO  Patient Yes No   Sig: Take 2 Tabs by mouth every bedtime.   acetaminophen (TYLENOL) 325 MG Tab   No No   Sig: Take 2 Tabs by mouth every 6 hours as needed (Mild Pain; (Pain scale 1-3); Temp greater than 100.5 F).   bisacodyl (DULCOLAX) 10 MG Suppos   No No   Sig: Insert 1 Suppository in rectum 1 time daily as needed (if magnesium hydroxide ineffective after 24 hours).   gabapentin (NEURONTIN) 300 MG Cap  Rx Bottle (For Med Information) Yes No   Sig: Take 300 mg by mouth 3 times a day.   hydroCHLOROthiazide (HYDRODIURIL) 25 MG Tab  Rx Bottle (For Med Information) Yes No   Sig: Take 25 mg by mouth every day.   lisinopril (PRINIVIL) 40 MG tablet  Rx Bottle (For Med Information) Yes No   Sig: Take 40 mg by mouth every day.   magnesium hydroxide (MILK OF MAGNESIA) 400 MG/5ML Suspension   No No   Sig: Take 30 mL by mouth 1 time daily as needed (if polyethylene glycol ineffective after 24 hours).   metoprolol SR (TOPROL XL) 50 MG TABLET SR 24 HR  Rx Bottle (For Med Information) Yes No   Sig: Take 50 mg by mouth every day.   senna-docusate (PERICOLACE OR SENOKOT S) 8.6-50 MG Tab   No No   Sig: Take 2 Tabs by mouth 2 Times a Day.   tamsulosin (FLOMAX) 0.4 MG capsule   No No   Sig: Take 1 Cap by mouth ONE-HALF HOUR AFTER BREAKFAST.      Facility-Administered Medications: None       Physical Exam  Temp:  [37.2 °C (99 °F)] 37.2 °C (99 °F)  Pulse:  [69-82] 69  Resp:  [16-17] 16  BP: (155-163)/(64-91) 158/91  SpO2:  [94 %-97 %] 94 %    Physical  Exam  Vitals signs and nursing note reviewed.   Constitutional:       General: He is not in acute distress.     Appearance: He is well-developed. He is ill-appearing.      Comments: Thin, frail, cachetic   HENT:      Head: Normocephalic and atraumatic.      Mouth/Throat:      Pharynx: No oropharyngeal exudate.   Eyes:      General: No scleral icterus.     Pupils: Pupils are equal, round, and reactive to light.   Neck:      Musculoskeletal: Normal range of motion and neck supple.      Thyroid: No thyromegaly.   Cardiovascular:      Rate and Rhythm: Normal rate and regular rhythm.      Heart sounds: Normal heart sounds. No murmur.   Pulmonary:      Effort: Pulmonary effort is normal. No respiratory distress.      Breath sounds: Normal breath sounds. No wheezing.   Abdominal:      General: Bowel sounds are normal. There is no distension.      Palpations: Abdomen is soft.      Tenderness: There is no abdominal tenderness.   Musculoskeletal: Normal range of motion.         General: Swelling and tenderness present.      Comments: R BKA with stump well stephen    LLE with smooth skin, very cool to touch of the LLE from the mid pre tibial area distally, poor capillary refill, warm to touch in the proximal and mid thigh of the LLE  Intact sensation to soft touch but somewhat diminished   Skin:     General: Skin is warm and dry.      Findings: No rash.   Neurological:      Mental Status: He is alert and oriented to person, place, and time.      Cranial Nerves: No cranial nerve deficit.         Laboratory:  Recent Labs     07/04/20 2014   WBC 7.7   RBC 3.19*   HEMOGLOBIN 9.9*   HEMATOCRIT 29.4*   MCV 92.2   MCH 31.0   MCHC 33.7   RDW 51.8*   PLATELETCT 188   MPV 10.7     Recent Labs     07/04/20 2014   SODIUM 125*   POTASSIUM 5.5   CHLORIDE 97   CO2 14*   GLUCOSE 72   BUN 52*   CREATININE 2.43*   CALCIUM 8.5     Recent Labs     07/04/20 2014   ALTSGPT 23   ASTSGOT 28   ALKPHOSPHAT 123*   TBILIRUBIN 0.3   GLUCOSE 72     Recent  Labs     07/04/20 2014   APTT 35.5   INR 1.03     No results for input(s): NTPROBNP in the last 72 hours.      No results for input(s): TROPONINT in the last 72 hours.    Imaging:  US-EXTREMITY ARTERY LOWER UNILAT LEFT         IR-EXTREMITY ANGIOGRAM-UNILATERAL LEFT    (Results Pending)   US-RENAL    (Results Pending)         Assessment/Plan:      * Critical lower limb ischemia- (present on admission)  Assessment & Plan  -main diagnosis, likely acute occlusion of the L femoral bypass  -as noted above    ADRIA (acute kidney injury) (HCC)- (present on admission)  Assessment & Plan  -2/2 dehydration?  -baseline CKD stage III around 1.5  -IVF's  -check bladder scan, renal US  -monitor uop  -hold outpatient nephrotoxic agents, optimize blood pressure    S/P AKA (above knee amputation), right (HCC)- (present on admission)  Assessment & Plan  -appears well healed    Essential hypertension- (present on admission)  Assessment & Plan  -somewhat elevated given concurrent pain  -hold outpatient HCTZ and ACE given impaired kidney function  -monitor closely  -consider IV hydralazine PRN    Dyslipidemia- (present on admission)  Assessment & Plan  -statin    PAD (peripheral artery disease) ([facility])- (present on admission)  Assessment & Plan  -severe, US pending  -appears to have re-occlusion of femoral bypass on the LLE  -critical limb ischemia, vascular surgery consulted  -weight based heparin gtt  -going to the OR tomorrow  -IV fentanyl and Oral oxy IR PRN pain  -monitor blood pressure,    Hyperkalemia- (present on admission)  Assessment & Plan  -mild, 2/2 Acute on CKD Stage III  -[place] x1 and monitor on tele, trend daily    Hyponatremia- (present on admission)  Assessment & Plan  -2/2 hypovolemia presumed  -start IVF's with NS and correct slowly, daily NA+

## 2020-07-05 NOTE — ASSESSMENT & PLAN NOTE
-2/2 hypovolemia presumed  Resolved.  Hold outpatient HCTZ  -start IVF's with NS and correct slowly, daily NA+

## 2020-07-05 NOTE — ED NOTES
Bedside report given to tele RN. Patient transported to floor via x2 ACLs RN on zoll. All belongings sent with patient. All questions answered.

## 2020-07-05 NOTE — ED NOTES
Med rec complete per interview with patient at bedside.  NKDA  No ABX taken in past 14 days    [facility] [address]

## 2020-07-05 NOTE — CARE PLAN
Problem: Communication  Goal: The ability to communicate needs accurately and effectively will improve  Outcome: PROGRESSING AS EXPECTED     Problem: Venous Thromboembolism (VTW)/Deep Vein Thrombosis (DVT) Prevention:  Goal: Patient will participate in Venous Thrombosis (VTE)/Deep Vein Thrombosis (DVT)Prevention Measures  Outcome: PROGRESSING AS EXPECTED     Problem: Knowledge Deficit  Goal: Knowledge of disease process/condition, treatment plan, diagnostic tests, and medications will improve  Outcome: PROGRESSING AS EXPECTED     Problem: Pain Management  Goal: Pain level will decrease to patient's comfort goal  Outcome: PROGRESSING AS EXPECTED

## 2020-07-05 NOTE — PROGRESS NOTES
2 RN SKIN CHECK  2 RN skin check complete with LUIS FERNANDO Steele  Devices in place: None  Skin assessed under devices: N/A  Confirmed pressure ulcers found on: N/A  New potential pressure ulcers: None  Wound consult placed N/A  The following interventions in place: N/A    Sacrum intact  Right above the knee amputation; healed  Left lower extremity cold and red from mid calf down with purplish discoloration of toes.   Bilateral upper extremities with scattered bruising and redness  Skin otherwise intact

## 2020-07-05 NOTE — PROGRESS NOTES
"Vascular    LLE pain slightly improved overnight  On heparin gtt  COVID pending.    /61   Pulse (!) 52   Temp 36.7 °C (98.1 °F) (Temporal)   Resp 16   Ht 1.727 m (5' 8\")   Wt 57.4 kg (126 lb 8.7 oz)   SpO2 99%   BMI 19.24 kg/m²     Left foot with 3-second cap refill    WBC normal  Hgb 10 stable  Creatinine 2.4 -> 2.1 today    A/P)  LLE pain from graft thrombosis  Angio and thrombolysis today  COVID pending. Spoke to [employer]: the COVID sent was the 24-hour one so I ordered the 2-4 hour one this morning in preparation for procedure this afternoon    Eligio Culp MD  [facility] (General and Vascular Surgery)  Cell: [phone] (text or call is fine, if you don't reach me please try my office)  Office: [phone]  __________________________________________________________________  Patient:Antonio Walker   MRN:[id number]   CSN:[id number]    7/5/2020    10:54 AM    "

## 2020-07-05 NOTE — ED TRIAGE NOTES
"Chief Complaint   Patient presents with   • Leg Pain     left leg       BP (!) 163/73   Pulse 69   Temp 37.2 °C (99 °F) (Temporal)   Resp 16   Ht 1.727 m (5' 8\")   Wt 65.8 kg (145 lb)   SpO2 97%   BMI 22.05 kg/m²       Patient BIB EMS per above. Patient has been experiencing left leg pain for 3 days. described at sharp, constant 8/10. Leg is warm, dry, pink. No S/S swelling. Chart marked for ERP. RN to start PIV and draw labs.  "

## 2020-07-05 NOTE — PROGRESS NOTES
12 hour chart check    Monitor Summary    .16/.08/.36  Rhythm: Sinus Rhythm/Sinus Bradycardia  Rate: 51-75  Ectopy: Rare PVCs

## 2020-07-05 NOTE — ASSESSMENT & PLAN NOTE
-main diagnosis, likely acute occlusion of the L femoral bypass  Status post angiogram and angioplasty on 7/6/2020.  Holding aspirin, clopidogrel, rivaroxaba Due to GI bleed  Vascular surgery is following, Dr. Culp and recommended to follow-up as an outpatient

## 2020-07-06 PROBLEM — R73.9 HYPERGLYCEMIA: Status: ACTIVE | Noted: 2020-01-01

## 2020-07-06 PROBLEM — E16.2 HYPOGLYCEMIA: Status: ACTIVE | Noted: 2020-01-01

## 2020-07-06 NOTE — ASSESSMENT & PLAN NOTE
Now post IR procedure with thrombolytic. Sheath in place and admitted to IR for slow infusion TPA and monitoring and 24 hours infusion. Frequent vascular checks, coag checks per protocol for thrombolytic  Plan to back to OR 7/6 for thrombectomy  Improved sensation and now with + pulses and warm extremity

## 2020-07-06 NOTE — OP REPORT
Vascular Surgery Operative Note  --------------------------------------------    Date of Service:          7/5/2020   Patient Name:             Antonio Walker  Patient MRN:              [id number]    --------------------------------------------------------------------------------------------------    Preoperative Diagnosis:  -Acute on chronic left lower extremity ischemia with rest pain  -Acute occlusion of the left femoral to above-knee popliteal artery bypass graft    Postoperative Diagnosis:  -Acute on chronic left lower extremity ischemia with rest pain  -Acute occlusion of the left femoral to above-knee popliteal artery bypass graft    Procedure:  -Percutaneous access of the left common femoral artery with ultrasound guidance, antegrade access  -Selective catheter placement in the left anterior tibial artery  -Left anterior tibial artery angiography  -Balloon angioplasty of the left above-knee popliteal artery with a 5 mm plain angioplasty balloon  -Insertion of a thrombolytic infusion catheter extending from the left common femoral artery to the origin of the left anterior tibial artery    -------------------------------------------------------------------------------------------------    Surgeon:                                 Eligio Culp MD    Assistant:   None    Anesthesia:                             Sedation plus local anesthetic    EBL:                                        minimal    Findings:     There appears to be a stenosis of the proximal left common femoral artery  There appears to be diffuse thrombus or chronic atherosclerotic disease throughout the left lower extremity  The left anterior tibial artery appears to be patent but there is poor flow onto the foot past the ankle    Complications:                        none    Disposition:                             Tolerated well, sent to recovery in stable  condition    -----------------------------------------------------------------------------------------------------    History:  Antonio Walker is a 70 y.o. male who presented to the emergency room with 3-day history of severe left leg pain.  Arterial studies show that his femoral to above-knee popliteal artery bypass graft was thrombosed.  Patient had ischemic rest pain of the left leg but his motor function was intact and his sensation was only mildly impaired.  I recommended we begin with a thrombolytic procedure in order to try and open up as much of the profunda distribution and also the lower leg runoff onto the foot as possible.  I explained to him that once the acute thrombus had been dissolved we would be able to better plan his next definitive revascularization procedure.    The patient and I discussed the recommendation for surgery as well as alternatives. We discussed the pertinent preoperative, intraoperative, and postoperative aspects of the procedure including anticipated recovery and how that could be affected by potential complications. We discussed potential risks from the surgery including but not limited to:  Bleeding and possible need for transfusion, if applicable. Infection of the incision or any potential implanted materials. Injury to nearby vessels or nerves. Injury to nearby organs. Risk of xray and contrast exposure. Risks of anesthesia.  We also discussed global risks including but not limited to: Blood clots, Stroke, Heart attack, Pulmonary complications, and not surviving the operation or the recovery. All questions were answered. They understand and agree to proceed. Informed consent was obtained.     Procedure Summary:  The patient was properly identified in the preoperative area, taken to the operating room, and placed in a supine position and IV sedation was given to make patient comfortable.  The patient was prepped and draped in the usual sterile fashion.  Surgical  timeout was called to identify the correct patient, procedure, and equipment.  Everyone was in agreement.    Local anesthetic was infiltrated and then we accessed the right common femoral artery percutaneously with ultrasound guidance.  This artery was found to be chronically occluded and we could not get a wire or sheath access at this location.  Therefore we injected local anesthetic over the left common femoral artery and we performed an antegrade access and we placed a 5 Slovak sheath into the left common femoral artery.     We performed an angiography of the left leg through the sheath but it only showed sluggish flow in the proximal thigh vessels and no significant distal flow in the profunda distribution or down the femoropopliteal bypass graft.  There did appear to be a stenosis from chronic atherosclerotic disease in the proximal to mid common femoral artery.    We were able to guide a wire into the left femoral to popliteal bypass graft and this wire traversed down into the left anterior tibial artery.  We followed this with a catheter which went into the origin of the left anterior tibial artery and we performed angiography of the left anterior tibial artery and the artery appear to be patent down to the ankle but there was poor flow past the ankle onto the foot.    As mentioned previously angiography through the common femoral artery sheath showed poor flow of contrast through the profunda or the femoropopliteal bypass graft.  However based on the wire behavior it seemed that the occlusion in the bypass conduit was soft and there was most likely a distal anastomotic stenosis because the wire had difficulty passing through this area to get down into the popliteal artery.  Once the wire past the distal anastomosis the wire did pass easily through the native popliteal artery down to the anterior tibial artery.  It therefore seemed to me that most likely a distal anastomotic stenosis had caused thrombosis of  the bypass conduit and also the native popliteal artery with likely extension down into the posterior tibial and peroneal arteries.  There also appears to be associated thrombus in the profunda femoris distribution.    At this point I performed a plain balloon angioplasty of the distal end of the bypass conduit into the native popliteal artery with a 5 mm angioplasty balloon.  Based on the balloon behavior there did appear to be stenosis at the location of the distal anastomosis.  This resolved with angioplasty.    I then selected a 5 Nepali thrombolytic infusion catheter with a 50 cm treatment length and advanced it from the common femoral artery down to the origin of the left anterior tibial artery to basically treat the entire distribution of the left leg.    The sheath was sutured in place and the access site was protected with Ioban to hold the catheter and sheath in place.  The side-port of the sheath was connected to heparin infusion at 500 units an hour and the infusion catheter was connected to TPA at a rate of 1 mg/h.    Patient tolerated the well and was sent to recovery in stable condition.    Postoperative plan:  Patient will be monitored in the ICU overnight with serial labs during his thrombolytic infusion.  He will be brought back to IR on postoperative day 1 for repeat angiogram.    Eligio uClp MD  General and Vascular Surgery  [facility]  [phone]

## 2020-07-06 NOTE — CONSULTS
Critical Care Consultation    Date of consult: 7/5/2020    Referring Physician  Eligio Culp M.D.    Reason for Consultation  Post IR sheath and thrombolytic infusion to relieve obstruction of LLE bypass graft.       History of Presenting Illness  70 y.o. male who presented 7/4/2020 with pain in LLE for three days. Seen in ED 7/4, consults vascular. Admitted to hospitalist service on heparin and went to IR today. Transferred post IR to ICU for catheter monitoring and low dose TPA infusion for 24 hours. No other acute medical issues. Hx right above the knee amputation, left femoral to above-knee popliteal artery bypass January 2020. Severe peripheral vascular disease    Code Status  Full Code    Review of Systems  Review of Systems   Constitutional: Negative.    HENT: Negative.    Eyes: Negative.    Respiratory: Negative.    Cardiovascular: Negative.    Gastrointestinal: Negative.    Genitourinary: Negative.    Musculoskeletal:        Admitted with LLE pain, better now   Skin: Negative.    Neurological: Negative.    Endo/Heme/Allergies: Negative.        Past Medical History   has no past medical history on file.    Surgical History   has a past surgical history that includes femoral endarterectomy (Left, 12/27/2019); angiogram (Left, 12/27/2019); angiogram (1/1/2020); and femoral tibial bypass (Left, 1/1/2020).    Family History  family history is not on file.    Social History   reports that he has quit smoking. He smoked 0.00 packs per day. He has never used smokeless tobacco. He reports that he does not drink alcohol or use drugs.    Medications  Home Medications     Reviewed by David Barr (Pharmacy Tech) on 07/04/20 at 2317  Med List Status: Complete   Medication Last Dose Status   gabapentin (NEURONTIN) 300 MG Cap 7/4/2020 Active   hydroCHLOROthiazide (HYDRODIURIL) 25 MG Tab 7/4/2020 Active   metoprolol SR (TOPROL XL) 50 MG TABLET SR 24 HR 7/4/2020 Active   tamsulosin (FLOMAX) 0.4 MG capsule  7/4/2020 Active              Current Facility-Administered Medications   Medication Dose Route Frequency Provider Last Rate Last Dose   • heparin infusion 25,000 units in 500 mL 0.45% NACL   Intra-arterial Continuous Eligio Culp M.D. 10 mL/hr at 07/05/20 1945 500 Units/hr at 07/05/20 1945   • alteplase (ACTIVASE) 10 mg in  mL Infusion  1 mg/hr Intra-arterial Continuous Eligio Culp M.D. 10 mL/hr at 07/05/20 1945 1 mg/hr at 07/05/20 1945   • gabapentin (NEURONTIN) capsule 300 mg  300 mg Oral TID Zeus Yepez M.D.   300 mg at 07/05/20 1703   • metoprolol SR (TOPROL XL) tablet 50 mg  50 mg Oral DAILY Zeus Yepez M.D.   50 mg at 07/05/20 0526   • tamsulosin (FLOMAX) capsule 0.4 mg  0.4 mg Oral AFTER BREAKFAST Zeus Yepez M.D.   0.4 mg at 07/05/20 0839   • senna-docusate (PERICOLACE or SENOKOT S) 8.6-50 MG per tablet 2 Tab  2 Tab Oral BID Zeus Yepez M.D.   2 Tab at 07/05/20 0526    And   • polyethylene glycol/lytes (MIRALAX) PACKET 1 Packet  1 Packet Oral QDAY PRVARGHESE Yepez M.D.        And   • magnesium hydroxide (MILK OF MAGNESIA) suspension 30 mL  30 mL Oral QDAY PRN Zeus Yepez M.D.        And   • bisacodyl (DULCOLAX) suppository 10 mg  10 mg Rectal QDAY PRN Zeus Yepez M.D.       • NS infusion   Intravenous Continuous Zeus Yepez M.D. 83 mL/hr at 07/05/20 2115     • ondansetron (ZOFRAN) syringe/vial injection 4 mg  4 mg Intravenous Q4HRS PRVARGHESE Yepez M.D.       • ondansetron (ZOFRAN ODT) dispertab 4 mg  4 mg Oral Q4HRS PRN Zeus Yepez M.D.       • acetaminophen (TYLENOL) tablet 650 mg  650 mg Oral Q6HRS PRVARGHESE Yepez M.D.       • fentaNYL (SUBLIMAZE) injection 25 mcg  25 mcg Intravenous Q2HRS PRVARGHESE Yepez M.D.   25 mcg at 07/05/20 2115   • oxyCODONE immediate-release (ROXICODONE) tablet 5 mg  5 mg Oral Q4HRS PRVARGHESE Yepez M.D.   5 mg at 07/05/20 2115       Allergies  No Known Allergies    Vital Signs last 24  hours  Temp:  [36.3 °C (97.3 °F)-37.2 °C (99 °F)] 36.3 °C (97.3 °F)  Pulse:  [52-95] 81  Resp:  [9-20] 20  BP: (129-192)/(47-96) 188/76  SpO2:  [86 %-100 %] 100 %    Physical Exam  Physical Exam  Vitals signs and nursing note reviewed.   Constitutional:       General: He is not in acute distress.     Appearance: Normal appearance. He is not ill-appearing, toxic-appearing or diaphoretic.   HENT:      Head: Normocephalic and atraumatic.      Nose: Nose normal.   Neck:      Musculoskeletal: Normal range of motion.   Cardiovascular:      Rate and Rhythm: Regular rhythm.      Pulses: Normal pulses.      Heart sounds: Normal heart sounds.   Pulmonary:      Effort: Pulmonary effort is normal.   Abdominal:      General: Abdomen is flat.      Palpations: Abdomen is soft.   Musculoskeletal:         General: Swelling present.      Comments: See nurses note, vascular surgery and IR notes re: blood flow to LLE pre/ post IR procedure   Skin:     General: Skin is warm and dry.      Capillary Refill: Capillary refill takes 2 to 3 seconds.   Neurological:      General: No focal deficit present.      Mental Status: He is alert and oriented to person, place, and time.         Fluids    Intake/Output Summary (Last 24 hours) at 7/5/2020 2116  Last data filed at 7/5/2020 2029  Gross per 24 hour   Intake 1393.88 ml   Output 1640 ml   Net -246.12 ml       Laboratory  Recent Results (from the past 48 hour(s))   CBC WITH DIFFERENTIAL    Collection Time: 07/04/20  8:14 PM   Result Value Ref Range    WBC 7.7 4.8 - 10.8 K/uL    RBC 3.19 (L) 4.70 - 6.10 M/uL    Hemoglobin 9.9 (L) 14.0 - 18.0 g/dL    Hematocrit 29.4 (L) 42.0 - 52.0 %    MCV 92.2 81.4 - 97.8 fL    MCH 31.0 27.0 - 33.0 pg    MCHC 33.7 33.7 - 35.3 g/dL    RDW 51.8 (H) 35.9 - 50.0 fL    Platelet Count 188 164 - 446 K/uL    MPV 10.7 9.0 - 12.9 fL    Neutrophils-Polys 62.00 44.00 - 72.00 %    Lymphocytes 25.30 22.00 - 41.00 %    Monocytes 8.30 0.00 - 13.40 %    Eosinophils 3.50 0.00 -  6.90 %    Basophils 0.60 0.00 - 1.80 %    Immature Granulocytes 0.30 0.00 - 0.90 %    Nucleated RBC 0.00 /100 WBC    Neutrophils (Absolute) 4.79 1.82 - 7.42 K/uL    Lymphs (Absolute) 1.95 1.00 - 4.80 K/uL    Monos (Absolute) 0.64 0.00 - 0.85 K/uL    Eos (Absolute) 0.27 0.00 - 0.51 K/uL    Baso (Absolute) 0.05 0.00 - 0.12 K/uL    Immature Granulocytes (abs) 0.02 0.00 - 0.11 K/uL    NRBC (Absolute) 0.00 K/uL   COMP METABOLIC PANEL    Collection Time: 07/04/20  8:14 PM   Result Value Ref Range    Sodium 125 (L) 135 - 145 mmol/L    Potassium 5.5 3.6 - 5.5 mmol/L    Chloride 97 96 - 112 mmol/L    Co2 14 (L) 20 - 33 mmol/L    Anion Gap 14.0 7.0 - 16.0    Glucose 72 65 - 99 mg/dL    Bun 52 (H) 8 - 22 mg/dL    Creatinine 2.43 (H) 0.50 - 1.40 mg/dL    Calcium 8.5 8.5 - 10.5 mg/dL    AST(SGOT) 28 12 - 45 U/L    ALT(SGPT) 23 2 - 50 U/L    Alkaline Phosphatase 123 (H) 30 - 99 U/L    Total Bilirubin 0.3 0.1 - 1.5 mg/dL    Albumin 3.8 3.2 - 4.9 g/dL    Total Protein 6.6 6.0 - 8.2 g/dL    Globulin 2.8 1.9 - 3.5 g/dL    A-G Ratio 1.4 g/dL   LACTIC ACID    Collection Time: 07/04/20  8:14 PM   Result Value Ref Range    Lactic Acid 1.0 0.5 - 2.0 mmol/L   APTT    Collection Time: 07/04/20  8:14 PM   Result Value Ref Range    APTT 35.5 24.7 - 36.0 sec   PROTHROMBIN TIME (INR)    Collection Time: 07/04/20  8:14 PM   Result Value Ref Range    PT 13.8 12.0 - 14.6 sec    INR 1.03 0.87 - 1.13   ESTIMATED GFR    Collection Time: 07/04/20  8:14 PM   Result Value Ref Range    GFR If  32 (A) >60 mL/min/1.73 m 2    GFR If Non  27 (A) >60 mL/min/1.73 m 2   FERRITIN    Collection Time: 07/04/20  8:14 PM   Result Value Ref Range    Ferritin 57.3 22.0 - 322.0 ng/mL   IRON/TOTAL IRON BIND    Collection Time: 07/04/20  8:14 PM   Result Value Ref Range    Iron 60 50 - 180 ug/dL    Total Iron Binding 289 250 - 450 ug/dL    Unsat Iron Binding 229 110 - 370 ug/dL    % Saturation 21 15 - 55 %   COVID/SARS CoV-2 PCR     Collection Time: 07/04/20 10:55 PM    Specimen: Nasopharyngeal; Respirate   Result Value Ref Range    COVID Order Status Received    SARS-CoV-2, PCR (In-House)    Collection Time: 07/04/20 10:55 PM   Result Value Ref Range    SARS-CoV-2 Source NP Swab     SARS-CoV-2 by PCR NotDetected    APTT    Collection Time: 07/05/20  3:21 AM   Result Value Ref Range    APTT >240.0 (HH) 24.7 - 36.0 sec   Basic Metabolic Panel    Collection Time: 07/05/20  3:21 AM   Result Value Ref Range    Sodium 128 (L) 135 - 145 mmol/L    Potassium 5.1 3.6 - 5.5 mmol/L    Chloride 101 96 - 112 mmol/L    Co2 16 (L) 20 - 33 mmol/L    Glucose 67 65 - 99 mg/dL    Bun 49 (H) 8 - 22 mg/dL    Creatinine 2.10 (H) 0.50 - 1.40 mg/dL    Calcium 8.2 (L) 8.5 - 10.5 mg/dL    Anion Gap 11.0 7.0 - 16.0   CBC WITH DIFFERENTIAL    Collection Time: 07/05/20  3:21 AM   Result Value Ref Range    WBC 8.1 4.8 - 10.8 K/uL    RBC 3.25 (L) 4.70 - 6.10 M/uL    Hemoglobin 10.0 (L) 14.0 - 18.0 g/dL    Hematocrit 30.8 (L) 42.0 - 52.0 %    MCV 94.8 81.4 - 97.8 fL    MCH 30.8 27.0 - 33.0 pg    MCHC 32.5 (L) 33.7 - 35.3 g/dL    RDW 53.1 (H) 35.9 - 50.0 fL    Platelet Count 163 (L) 164 - 446 K/uL    MPV 11.4 9.0 - 12.9 fL    Neutrophils-Polys 58.40 44.00 - 72.00 %    Lymphocytes 27.90 22.00 - 41.00 %    Monocytes 9.10 0.00 - 13.40 %    Eosinophils 3.60 0.00 - 6.90 %    Basophils 0.60 0.00 - 1.80 %    Immature Granulocytes 0.40 0.00 - 0.90 %    Nucleated RBC 0.00 /100 WBC    Neutrophils (Absolute) 4.75 1.82 - 7.42 K/uL    Lymphs (Absolute) 2.27 1.00 - 4.80 K/uL    Monos (Absolute) 0.74 0.00 - 0.85 K/uL    Eos (Absolute) 0.29 0.00 - 0.51 K/uL    Baso (Absolute) 0.05 0.00 - 0.12 K/uL    Immature Granulocytes (abs) 0.03 0.00 - 0.11 K/uL    NRBC (Absolute) 0.00 K/uL   ESTIMATED GFR    Collection Time: 07/05/20  3:21 AM   Result Value Ref Range    GFR If  38 (A) >60 mL/min/1.73 m 2    GFR If Non  31 (A) >60 mL/min/1.73 m 2   COD (ADULT)    Collection  Time: 07/05/20  3:21 AM   Result Value Ref Range    ABO Grouping Only B     Rh Grouping Only POS     Antibody Screen-Cod NEG    APTT    Collection Time: 07/05/20 12:25 PM   Result Value Ref Range    APTT 180.6 (HH) 24.7 - 36.0 sec   COVID/SARS CoV-2 PCR    Collection Time: 07/05/20 12:44 PM    Specimen: Nasopharyngeal; Respirate   Result Value Ref Range    COVID Order Status Received    SARS-CoV-2, PCR (In-House)    Collection Time: 07/05/20 12:44 PM   Result Value Ref Range    SARS-CoV-2 Source Nasal Swab     SARS-CoV-2 (RdRp gene) NotDetected    CBC WITH DIFFERENTIAL - STAT    Collection Time: 07/05/20  8:12 PM   Result Value Ref Range    WBC 5.9 4.8 - 10.8 K/uL    RBC 3.26 (L) 4.70 - 6.10 M/uL    Hemoglobin 10.0 (L) 14.0 - 18.0 g/dL    Hematocrit 30.3 (L) 42.0 - 52.0 %    MCV 92.9 81.4 - 97.8 fL    MCH 30.7 27.0 - 33.0 pg    MCHC 33.0 (L) 33.7 - 35.3 g/dL    RDW 52.6 (H) 35.9 - 50.0 fL    Platelet Count 145 (L) 164 - 446 K/uL    MPV 11.4 9.0 - 12.9 fL    Neutrophils-Polys 69.70 44.00 - 72.00 %    Lymphocytes 19.80 (L) 22.00 - 41.00 %    Monocytes 7.10 0.00 - 13.40 %    Eosinophils 2.70 0.00 - 6.90 %    Basophils 0.50 0.00 - 1.80 %    Immature Granulocytes 0.20 0.00 - 0.90 %    Nucleated RBC 0.00 /100 WBC    Neutrophils (Absolute) 4.11 1.82 - 7.42 K/uL    Lymphs (Absolute) 1.17 1.00 - 4.80 K/uL    Monos (Absolute) 0.42 0.00 - 0.85 K/uL    Eos (Absolute) 0.16 0.00 - 0.51 K/uL    Baso (Absolute) 0.03 0.00 - 0.12 K/uL    Immature Granulocytes (abs) 0.01 0.00 - 0.11 K/uL    NRBC (Absolute) 0.00 K/uL   BASIC METABOLIC PANEL    Collection Time: 07/05/20  8:12 PM   Result Value Ref Range    Sodium 135 135 - 145 mmol/L    Potassium 5.2 3.6 - 5.5 mmol/L    Chloride 107 96 - 112 mmol/L    Co2 13 (L) 20 - 33 mmol/L    Glucose 48 (L) 65 - 99 mg/dL    Bun 39 (H) 8 - 22 mg/dL    Creatinine 1.70 (H) 0.50 - 1.40 mg/dL    Calcium 8.3 (L) 8.5 - 10.5 mg/dL    Anion Gap 15.0 7.0 - 16.0   FIBRINOGEN - STAT    Collection Time: 07/05/20   8:12 PM   Result Value Ref Range    Fibrinogen 404 215 - 460 mg/dL   APTT - STAT    Collection Time: 07/05/20  8:12 PM   Result Value Ref Range    APTT 55.7 (H) 24.7 - 36.0 sec   PROTHROMBIN TIME - STAT    Collection Time: 07/05/20  8:12 PM   Result Value Ref Range    PT 14.0 12.0 - 14.6 sec    INR 1.05 0.87 - 1.13   ESTIMATED GFR    Collection Time: 07/05/20  8:12 PM   Result Value Ref Range    GFR If  48 (A) >60 mL/min/1.73 m 2    GFR If Non African American 40 (A) >60 mL/min/1.73 m 2       Imaging  US-RENAL   Final Result      Unremarkable renal ultrasound.      US-EXTREMITY ARTERY LOWER UNILAT LEFT   Final Result      IR-EXTREMITY ANGIOGRAM-UNILATERAL LEFT    (Results Pending)   IR-ANGIO THROUGH EXISTING CATHETER    (Results Pending)       Assessment/Plan  * Critical lower limb ischemia- (present on admission)  Assessment & Plan  Now post IR procedure with thrombolytic. Sheath in place and admitted to IR for slow infusion and monitoring and 24 hours infusion. Frequent vascular checks, coag checks per protocol for thrombolytic    Acute renal failure superimposed on stage 3 chronic kidney disease (HCC)- (present on admission)  Assessment & Plan  Monitor, avoid nephrotoxic agents (creatinine 2.46). No indication now for renal replacement therapy.    Essential hypertension- (present on admission)  Assessment & Plan  Monitor    PAD (peripheral artery disease) (HCC)- (present on admission)  Assessment & Plan  Smoking cessation and close monitoring. Pt may be at risk to lose LLE- already has above the knee amputation on right      Discussed patient condition and risk of morbidity and/or mortality with RN, RT, Pharmacy, Charge nurse / hot rounds and Patient.

## 2020-07-06 NOTE — PROGRESS NOTES
Critical Care Progress Note    Date of admission  7/4/2020    Chief Complaint  70 y.o. male who presented 7/4/2020 with pain in LLE for three days. Seen in ED 7/4, consults vascular. Admitted to hospitalist service on heparin and went to IR today. Transferred post IR to ICU for catheter monitoring and low dose TPA infusion for 24 hours. No other acute medical issues. Hx right above the knee amputation, left femoral to above-knee popliteal artery bypass January 2020. Severe peripheral vascular disease Taken from Dr Mace note.     Hospital Course  7/5 s/p TPA catheter to left leg  7/6 going back to OR for thrombectomy    Interval Problem Update  Reviewed last 24 hour events:  Neuro: aox4 morphine pca for pain mild sleepy, still numb and weak to left lower leg  HR: 70's  SBP: 150's, pulse now doppler in left foot  Tmax: afebrile   GI: npo for surgery BM 7/3 hypoglycemic to 40 D10 at 50ml/hr   UOP: adequate  Lines: peripheral IV's condom cath  Resp: none   Vte: heparin gtt  PPI/H2:n/a  Antibx: none  Electrolytes within normal limits  Hypoglycemia then on chemistry hyperglycemia will stop D5NS and go to NS at 75ml/hr  Sliding scale started    Discussed with vascular since thrombolysis TPA was done can go to floor status will place transfer if status changes post surgery will continue to follow    Review of Systems  Review of Systems   Constitutional: Negative for fever, malaise/fatigue and weight loss.   HENT: Negative for congestion, hearing loss, nosebleeds and sinus pain.    Eyes: Negative for blurred vision, photophobia and discharge.   Respiratory: Negative for cough, hemoptysis, sputum production and shortness of breath.    Cardiovascular: Positive for claudication. Negative for chest pain, orthopnea and leg swelling.   Gastrointestinal: Negative for abdominal pain, diarrhea, heartburn, nausea and vomiting.   Genitourinary: Negative for dysuria.   Musculoskeletal: Negative for joint pain and myalgias.    Neurological: Negative for dizziness, sensory change, speech change, focal weakness, seizures, loss of consciousness and headaches.   Endo/Heme/Allergies: Bruises/bleeds easily.   Psychiatric/Behavioral: Negative for depression, hallucinations and substance abuse. The patient is not nervous/anxious.         Vital Signs for last 24 hours   Temp:  [35.7 °C (96.3 °F)-36.8 °C (98.2 °F)] 35.7 °C (96.3 °F)  Pulse:  [51-95] 70  Resp:  [9-23] 18  BP: (124-188)/(47-96) 145/66  SpO2:  [86 %-100 %] 99 %    Hemodynamic parameters for last 24 hours       Respiratory Information for the last 24 hours       Physical Exam   Physical Exam  Constitutional:       General: He is not in acute distress.     Appearance: He is not ill-appearing.      Comments: Patient laying flat in no acute distress sleeping when I enter the room    HENT:      Head: Normocephalic.      Nose: Nose normal.      Mouth/Throat:      Mouth: Mucous membranes are dry.   Eyes:      Pupils: Pupils are equal, round, and reactive to light.   Cardiovascular:      Rate and Rhythm: Normal rate.      Heart sounds: No murmur.   Pulmonary:      Effort: No respiratory distress.      Breath sounds: No stridor. No wheezing or rhonchi.   Abdominal:      General: There is no distension.      Palpations: There is no mass.      Tenderness: There is no abdominal tenderness. There is no guarding or rebound.      Hernia: No hernia is present.   Genitourinary:     Comments: Condom cath on   Musculoskeletal:      Comments: Right AKA, Left catheter in place in groin warm lower ext, with numbness   Neurological:      Mental Status: He is alert.         Medications  Current Facility-Administered Medications   Medication Dose Route Frequency Provider Last Rate Last Dose   • NS infusion   Intravenous Continuous Barrington Esteban M.D. 75 mL/hr at 07/06/20 1408     • insulin regular (HUMULIN R) injection 2-9 Units  2-9 Units Subcutaneous Q6HRS Barrington Esteban M.D.   Stopped at 07/06/20  1345    And   • glucose 4 g chewable tablet 16 g  16 g Oral Q15 MIN PRVARGHESE Esteban M.D.        And   • dextrose 50% (D50W) injection 50 mL  50 mL Intravenous Q15 MIN PRVARGHESE Esteban M.D.       • heparin infusion 25,000 units in 500 mL 0.45% NACL   Intra-arterial Continuous Eligio Culp M.D. 10 mL/hr at 07/06/20 1016 500 Units/hr at 07/06/20 1016   • alteplase (ACTIVASE) 10 mg in  mL Infusion  1 mg/hr Intra-arterial Continuous Eligio Culp M.D. 10 mL/hr at 07/06/20 1015 1 mg/hr at 07/06/20 1015   • oxyCODONE immediate-release (ROXICODONE) tablet 5 mg  5 mg Oral Q4HRS PRVARGHESE Ordoñez M.D.        Or   • oxyCODONE immediate release (ROXICODONE) tablet 10 mg  10 mg Oral Q4HRS PRVARGHESE Ordoñez M.D.       • Pharmacy Consult Request ...Pain Management Review 1 Each  1 Each Other PHARMACY TO DOSE Eligio Culp M.D.       • acetaminophen (TYLENOL) tablet 650 mg  650 mg Oral Q6HRS Eligio Culp M.D.   650 mg at 07/06/20 1158   • ketorolac (TORADOL) injection 15 mg  15 mg Intravenous Q6HR Eligio Culp M.D.   15 mg at 07/06/20 1158   • morphine 1 mg/mL in 50 mL (PCA)   Intravenous Continuous Eligio Culp M.D.       • gabapentin (NEURONTIN) capsule 300 mg  300 mg Oral TID Zeus Yepez M.D.   300 mg at 07/06/20 1158   • metoprolol SR (TOPROL XL) tablet 50 mg  50 mg Oral DAILY Zeus Yepez M.D.   50 mg at 07/06/20 0512   • tamsulosin (FLOMAX) capsule 0.4 mg  0.4 mg Oral AFTER BREAKFAST Zeus Yepez M.D.   0.4 mg at 07/06/20 0927   • senna-docusate (PERICOLACE or SENOKOT S) 8.6-50 MG per tablet 2 Tab  2 Tab Oral BID Zeus Yepez M.D.   2 Tab at 07/05/20 0526    And   • polyethylene glycol/lytes (MIRALAX) PACKET 1 Packet  1 Packet Oral QDAY PRVARGHESE Yepez M.D.        And   • magnesium hydroxide (MILK OF MAGNESIA) suspension 30 mL  30 mL Oral QDAY ABBI Yepez M.D.        And   • bisacodyl (DULCOLAX) suppository 10 mg  10 mg Rectal  QDAY PRVARGHESE Yepez M.D.       • ondansetron (ZOFRAN) syringe/vial injection 4 mg  4 mg Intravenous Q4HRS PRVARGHESE Yepez M.D.       • ondansetron (ZOFRAN ODT) dispertab 4 mg  4 mg Oral Q4HRS PRVARGHESE Yepez M.D.       • acetaminophen (TYLENOL) tablet 650 mg  650 mg Oral Q6HRS PRVARGHESE Yepez M.D.       • fentaNYL (SUBLIMAZE) injection 25 mcg  25 mcg Intravenous Q2HRS PRVARGHESE Yepez M.D.   25 mcg at 07/05/20 2115       Fluids    Intake/Output Summary (Last 24 hours) at 7/6/2020 1541  Last data filed at 7/6/2020 1400  Gross per 24 hour   Intake 4115.05 ml   Output 900 ml   Net 3215.05 ml       Laboratory          Recent Labs     07/05/20 2012 07/06/20 0410 07/06/20  1215   SODIUM 135 135 137   POTASSIUM 5.2 4.9 4.9   CHLORIDE 107 108 111   CO2 13* 17* 18*   BUN 39* 36* 30*   CREATININE 1.70* 1.66* 1.55*   CALCIUM 8.3* 8.0* 7.7*     Recent Labs     07/04/20 2014 07/05/20 2012 07/06/20 0410 07/06/20  1215   ALTSGPT 23  --   --   --   --    ASTSGOT 28  --   --   --   --    ALKPHOSPHAT 123*  --   --   --   --    TBILIRUBIN 0.3  --   --   --   --    GLUCOSE 72   < > 48* 130* 503*    < > = values in this interval not displayed.     Recent Labs     07/04/20 2014 07/05/20 2012 07/06/20 0410 07/06/20  1215   WBC 7.7   < > 5.9 4.8 4.9   NEUTSPOLYS 62.00   < > 69.70 60.40 66.30   LYMPHOCYTES 25.30   < > 19.80* 22.60 18.10*   MONOCYTES 8.30   < > 7.10 10.50 9.50   EOSINOPHILS 3.50   < > 2.70 5.50 5.30   BASOPHILS 0.60   < > 0.50 0.60 0.60   ASTSGOT 28  --   --   --   --    ALTSGPT 23  --   --   --   --    ALKPHOSPHAT 123*  --   --   --   --    TBILIRUBIN 0.3  --   --   --   --     < > = values in this interval not displayed.     Recent Labs     07/04/20 2014 07/05/20  0321  07/05/20 2012 07/06/20 0410 07/06/20  1215   RBC 3.19* 3.25*  --  3.26* 2.98* 3.34*   HEMOGLOBIN 9.9* 10.0*  --  10.0* 8.9* 10.2*   HEMATOCRIT 29.4* 30.8*  --  30.3* 27.4* 32.3*   PLATELETCT 188 163*  --  145*  --   137*   PROTHROMBTM 13.8  --   --  14.0 14.7*  --    APTT 35.5 >240.0*   < > 55.7* 59.6* 54.5*   INR 1.03  --   --  1.05 1.11  --    IRON 60  --   --   --   --   --    FERRITIN 57.3  --   --   --   --   --    TOTIRONBC 289  --   --   --   --   --     < > = values in this interval not displayed.       Imaging  Ultrasound:  Reviewed    Assessment/Plan  * Critical lower limb ischemia- (present on admission)  Assessment & Plan  Now post IR procedure with thrombolytic. Sheath in place and admitted to IR for slow infusion TPA and monitoring and 24 hours infusion. Frequent vascular checks, coag checks per protocol for thrombolytic  Plan to back to OR 7/6 for thrombectomy  Improved sensation and now with + pulses and warm extremity    Acute renal failure superimposed on stage 3 chronic kidney disease (HCC)- (present on admission)  Assessment & Plan  Monitor, avoid nephrotoxic agents (creatinine 2.46). No indication now for renal replacement therapy.  Improved with fluid therapy monitor CPK with acute leg ischemia    History of tobacco abuse- (present on admission)  Assessment & Plan  Hx of recommend cessation    Essential hypertension- (present on admission)  Assessment & Plan  Monitor    PAD (peripheral artery disease) (HCC)- (present on admission)  Assessment & Plan  Smoking cessation and close monitoring. Pt may be at risk to lose LLE- already has above the knee amputation on right    Hyperglycemia  Assessment & Plan  Low dose sliding scale since episodes of hypoglycemic  Monitor closely with hypoglycemic protocols  Will stop Dextrose insfusion    Hypoglycemia  Assessment & Plan  Was hypoglycemic need continuous infusion on dextrose and now hyperglycemic  Serial monitor with hypoglycemic protocols    Hyponatremia- (present on admission)  Assessment & Plan  Chronic mild        VTE:  Heparin  Ulcer: Not Indicated  Lines: None    I have performed a physical exam and reviewed and updated ROS and Plan today (7/6/2020). In  review of yesterday's note (7/5/2020), there are no changes except as documented above.     Discussed patient condition and risk of morbidity and/or mortality with RN, RT, Pharmacy, Charge nurse / hot rounds and Patient  The patient remains critically ill acute limb ischemia on TPA infusion with serial vascular checks.  Critical care time = 38 minutes in directly providing and coordinating critical care and extensive data review.  No time overlap and excludes procedures.

## 2020-07-06 NOTE — ASSESSMENT & PLAN NOTE
Smoking cessation and close monitoring. Pt may be at risk to lose LLE- already has above the knee amputation on right

## 2020-07-06 NOTE — CARE PLAN
Problem: Nutritional:  Goal: Achieve adequate nutritional intake  Description: Patient will resume diet and consume 50% of meals  Outcome: NOT MET

## 2020-07-06 NOTE — PROGRESS NOTES
Vascular    Has a monophasic AT signal above the ankle and the leg is warmer to palpation than before the procedure    Having a fair amount of pain, I ordered a morphine PCA    Back to IR tomorrow for repeat angiogram, likely late afternoon or evening    Appreciate ICU's support managing Ariel Walker    Eligio Culp MD  [facility] (General and Vascular Surgery)  Cell: [phone] (text or call is fine, if you don't reach me please try my office)  Office: [phone]  __________________________________________________________________  Patient:Antonio Meneses Walker   MRN:[id number]   CSN:[id number]    7/5/2020    11:21 PM

## 2020-07-06 NOTE — PROGRESS NOTES
Triage officer note /transfer note     Room    D/W Dr De Luna     CC: PVD     ICU to do: vascular did TPA with success. ADRIA improving. HypoGlycemia initially but get better.     Meed to do: cont monitor suger, also follow vascular further recommendation.    Transfer to surgical.    Transfer was noticed after 3pm.

## 2020-07-06 NOTE — DISCHARGE PLANNING
"Hospital Care Management Team Assessment     In the case of an emergency, pt's NOK is Magui Welch (Daughter) at [phone].     This RNCM spoke with patient at bedside, verified the accuracy of the facesheet, and obtained the information used in this assessment.      Pt lives alone in a motel in [place], [place]. Prior to current hospitalization, pt was assisted with ADLS/IADLS with a caregiver \"a few times a week.\" Pt uses a wheelchair and stated to RNCM that he is \"100% wheelchair dependent.\". Pt stated his caregiver drives Pt to and from his appointments, or sometimes he gets a taxi if needed. Pt is currently retired. Pt has prescription coverage, and uses the [facility] on [address] in [place]. Pt's support system includes his daughter, his sister, and his caregiver. Pt's discharge plan is TBD pending medical team evaluation.      Information Source  Orientation : Oriented x 4  Information Given By: Patient  Informant's Name: Antonio Walker  Who is responsible for making decisions for patient? : Patient    Readmission Evaluation  Is this a readmission?: No    Elopement Risk  Legal Hold: No  Ambulatory or Self Mobile in Wheelchair: No-Not an Elopement Risk  Disoriented: No  Psychiatric Symptoms: None  History of Wandering: No  Elopement this Admit: No  Vocalizing Wanting to Leave: No  Displays Behaviors, Body Language Wanting to Leave: No-Not at Risk for Elopement  Elopement Risk: Not at Risk for Elopement    Interdisciplinary Discharge Planning  Does Admitting Nurse Feel This Could be a Complex Discharge?: No  Primary Care Physician: Amy Almaraz MD  Lives with - Patient's Self Care Capacity: Alone and Able to Care For Self(With the help of a caregiver \"a few times a week\")  Patient or legal guardian wants to designate a caregiver (see row info): Yes  Caregiver name: Steven  Support Systems: Family Member(s), Children, Other (Comments)(Caregiver, Pt states he can't remember where from though)  Housing / " Facility: [facility]  Do You Take your Prescribed Medications Regularly: Yes  Able to Return to Previous ADL's: Yes  Mobility Issues: Yes  Prior Services: Other (Comments)(Caregiver)  Patient Expects to be Discharged to:: TBD  Assistance Needed: Yes  Durable Medical Equipment: Other - Specify(Wheelchair)    Discharge Preparedness  What is your plan after discharge?: Uncertain - pending medical team collaboration  What are your discharge supports?: Child, Sibling, Other (comment)(Sister, Daughter, and caregiver)  Prior Functional Level: Uses Wheelchair, Needs Assist with Activities of Daily Living, Needs Assist with Medication Management  Difficulity with ADLs: Walking  Difficulty with ADLs Comment: Uses wheelchair, Pt states 100% wheelchair dependent  Difficulity with IADLs: Cooking, Driving, Laundry, Shopping  Difficulity with IADL Comments: Caregiver assists with IADLs    Functional Assesment  Prior Functional Level: Uses Wheelchair, Needs Assist with Activities of Daily Living, Needs Assist with Medication Management    Finances  Financial Barriers to Discharge: No  Prescription Coverage: Yes    Vision / Hearing Impairment  Vision Impairment : Yes  Right Eye Vision: Impaired, Wears Glasses  Left Eye Vision: Impaired, Wears Glasses  Hearing Impairment : No      Advance Directive  Advance Directive?: None  Advance Directive offered?: AD Booklet refused    Domestic Abuse  Have you ever been the victim of abuse or violence?: No  Physical Abuse or Sexual Abuse: No  Verbal Abuse or Emotional Abuse: No  Possible Abuse Reported to:: Not Applicable    Psychological Assessment  History of Substance Abuse: None  History of Psychiatric Problems: No  Non-compliant with Treatment: No  Newly Diagnosed Illness: No    Discharge Risks or Barriers  Discharge risks or barriers?: Transportation, Post-acute placement / services, Complex medical needs, Other (comment)(Lives in a motel)    Anticipated Discharge Information  Anticipated  "discharge disposition: Discharge needs currently unknown      For further assistance please contact the assigned RN Case Manager or  at the extension listed under \"Treatment Teams\".    "

## 2020-07-06 NOTE — ASSESSMENT & PLAN NOTE
Low dose sliding scale since episodes of hypoglycemic  Monitor closely with hypoglycemic protocols  Will stop Dextrose insfusion

## 2020-07-06 NOTE — ASSESSMENT & PLAN NOTE
Monitor, avoid nephrotoxic agents (creatinine 2.46). No indication now for renal replacement therapy.  Improved with fluid therapy monitor CPK with acute leg ischemia

## 2020-07-06 NOTE — PROGRESS NOTES
Noted patient glucose on lab work at 48, pt given snacks. Will recheck POC glucose once patient done eating snacks.

## 2020-07-06 NOTE — ASSESSMENT & PLAN NOTE
Was hypoglycemic need continuous infusion on dextrose and now hyperglycemic  Serial monitor with hypoglycemic protocols

## 2020-07-06 NOTE — PROGRESS NOTES
Tech from Lab called with critical result of Glucose 503 at 1310. Critical lab result read back to Tech.   Dr. Esteban notified of critical lab result at 1310.  Critical lab result read back by Dr. Esteban.

## 2020-07-06 NOTE — PROGRESS NOTES
IR RN note    Patient underwent a Left lower extremity angiogram, aortogram with runoff, possible interventions, thrombolysis by Dr. Oh.  Procedure site was verified by MD using imaging guidance. Consent was signed.  NATALIYA Dumont and Royce assisted. Patient was placed in a supine position.  Vitals were taken every 5 minutes and remained stable during procedure (see doc flow sheet for results).  CO2 waveform capnography was monitored throughout procedure, see chart.  Left groin access site.   5 F venous sheath, biopatch and tegaderm dressing was placed over surgical site. Report called to Ely GOULD. Pt transported by peggy with RN to R103, with monitor .   Reviewed sedation orders with MD  No pulses noted with doppler on left leg pre and post procedure. MD Culp aware    Pt started Alteplase and heparin for 24hr treatment - Left groin 5Fr sheath      Thrombus management Audra valve infusion Catheter  5Fr  Ref 23208-25  Lot [id number]  Exp 11-

## 2020-07-06 NOTE — DIETARY
"Nutrition Services: Day 2 of admit.  Antonio Walker is a 70 y.o. male with admitting DX of Leg pain  Consult received for Poor PO & Wt Loss >34# in >1 Year on Nutrition Admit Screen (MST 5)    Assessment:  Ht: 172.7 cm, Wt 7/5: 60 kg via bed scale, BMI adjusted for right AKA: 22.5 - Normal  Diet/Intake: NPO since this morning - Pt was on a regular diet prior and per chart pt PO 0-100% 2 meals documented.      Evaluation:   1. Per current department guidelines dietary staff not permitted to enter isolation rooms at this time.  2. Per chart review pt's wt on 1/1 was 68.4 kg via bed scale. Wt loss percent is 12.3% in 6 months, which is severe.  3. Per MD note 7/5: \"Back to IR tomorrow for repeat angiogram, likely late afternoon or evening\"  4. Labs: Glucose 130, BUN 36, Creatinine 1.66  5. Meds: neurontin, toradol, toprol xl, pericolace, flomax, alteplase, morphine (PCA)  6. Fluids: D5 NS infusion @ 100 mL/hr  7. Last BM: 7/3    Malnutrition Risk: Pt with severe wt loss of 12.3% in 6 months. No other criteria noted at this time.     Recommendations/Plan:  1. Resume PO diet when medically feasible.    2. Monitor weight.  3. Obtain supplement order per RD as needed.    RD following    "

## 2020-07-06 NOTE — PROGRESS NOTES
Vascular    Successful thrombolysis overnight  Left foot perfusion much improved  Back to IR today to repeat the angiogram and plan additional revascularization procedures as needed.    Eligio Culp MD  [facility] (General and Vascular Surgery)  Cell: [phone] (text or call is fine, if you don't reach me please try my office)  Office: [phone]  __________________________________________________________________  Patient:Antonio Walker   MRN:[id number]   CSN:[id number]    7/6/2020    4:25 PM

## 2020-07-06 NOTE — CARE PLAN
Problem: Communication  Goal: The ability to communicate needs accurately and effectively will improve  Outcome: PROGRESSING AS EXPECTED     Problem: Safety  Goal: Will remain free from injury  Outcome: PROGRESSING AS EXPECTED  Goal: Will remain free from falls  Outcome: PROGRESSING AS EXPECTED     Problem: Knowledge Deficit  Goal: Knowledge of disease process/condition, treatment plan, diagnostic tests, and medications will improve  Outcome: PROGRESSING SLOWER THAN EXPECTED  Goal: Knowledge of the prescribed therapeutic regimen will improve  Outcome: PROGRESSING SLOWER THAN EXPECTED     Problem: Pain Management  Goal: Pain level will decrease to patient's comfort goal  Outcome: PROGRESSING AS EXPECTED     Problem: Skin Integrity  Goal: Risk for impaired skin integrity will decrease  Outcome: PROGRESSING AS EXPECTED

## 2020-07-07 NOTE — PROGRESS NOTES
Vascular    Excellent reperfusion of the left foot via the AT  On ASA 81, Plavix, and Xarelto 2.5  PT/OT  TTF  Discharge once cleared by PT and medicine, may need SNF  Outpatient follow-up with me for ongoing vascular care    Eligio Culp MD  [facility] (General and Vascular Surgery)  Cell: [phone] (text or call is fine, if you don't reach me please try my office)  Office: [phone]  __________________________________________________________________  Patient:Antonio Walker   MRN:[id number]   CSN:[id number]    7/7/2020    11:45 AM

## 2020-07-07 NOTE — ASSESSMENT & PLAN NOTE
Results from last 7 days   Lab Units 07/07/20  1116 07/07/20  0540 07/07/20  0012 07/06/20  1912 07/06/20  1512 07/05/20  2211   ACCU CHECK GLUCOSE 788 mg/dL 96 86 77 89 85 61*     I have ordered insulin sliding scale with D50 and glucagon for hypoglycemia per protocol.  Encourage p.o. intake  Diabetic education

## 2020-07-07 NOTE — PROGRESS NOTES
Discussed patient's HTN with rounding MD, Dr. Danielson. Orders placed in Epic per MD. Also discussed Q8 hr lab draws, okay to DC at this time.

## 2020-07-07 NOTE — ASSESSMENT & PLAN NOTE
-counseled for more than 4 minutes on tobacco cessation 82059   -I have ordered nicotine replacement therapy  Counseled extensively on smoking and peripheral vascular disease

## 2020-07-07 NOTE — PROGRESS NOTES
LLE angio with left popliteal stent placement performed by Dr Culp via existing left antegrade sheath. Procedure BARs explained to patient by physician and consent obtained. Patient to IR1 and assisted to table. Patient was monitored and assessed continuously throughout procedure; ETCO2 25-30 with consistent waveform. Procedure completed without complication. Left groin puncture site CDI; sterile guaze/tegaderm dressing applied. Patient tolerated procedure fairly well, c/o bladder pressure afterward. Patient returned to RICU in stable condition. Bedside handoff to Janina GOULD.

## 2020-07-07 NOTE — CARE PLAN
Problem: Safety  Goal: Will remain free from falls  Outcome: PROGRESSING AS EXPECTED   Bed alarm in place    Problem: Bowel/Gastric:  Goal: Normal bowel function is maintained or improved  Outcome: PROGRESSING AS EXPECTED   PO intake encouraged

## 2020-07-07 NOTE — PROGRESS NOTES
Hospital Medicine Daily Progress Note    Date of Service  7/7/2020    Chief Complaint  70 y.o. male admitted 7/4/2020 with left lower extremity pain    Hospital Course    Mr. Antonio Walker is a 70 y.o. male with history of severe peripheral arterial disease with prior femoral artery bypass occlusions who presented on 7/4/2020 with acute left leg pain x3 days.  Pain starts in the mid thigh and goes all the way down to the distal feet and toes.  Also reports associated weakness and numbness.  Son confirms thrombosis vascular surgery Dr. Culp was consulted for critical limb ischemia.  Patient was started on a heparin drip and later TPA.  Status post lower extremity angiogram with stent placement in the left popliteal artery.  Patient was started on clopidogrel, aspirin, rivaroxaban.          Interval Problem Update  Patient was seen and examined at bedside.  I have personally reviewed vitals, labs, and imaging.    7/7.  Hypothermia last night.  Episodes of bradycardia, hypertension.  On 2 L nasal cannula.  Does not use oxygen at home.  Decreased metoprolol.  Start losartan.  Positive pulses on Doppler.  Denies fever, chills, chest pain, shortness of breath.  Left lower extremity pain currently well controlled.      Consultants/Specialty  Vascular surgery  Critical care    Code Status  FULL    Disposition  Stable for transfer out of ICU    Review of Systems  Review of Systems   Constitutional: Negative for chills and fever.   HENT: Negative for congestion and sore throat.    Eyes: Negative for blurred vision.   Respiratory: Negative for cough and shortness of breath.    Cardiovascular: Negative for chest pain, palpitations and leg swelling.   Gastrointestinal: Negative for abdominal pain, constipation, diarrhea, nausea and vomiting.   Genitourinary: Negative for dysuria, frequency and urgency.   Musculoskeletal: Positive for myalgias (Left lower extremity). Negative for falls.   Skin: Negative for rash.    Neurological: Negative for dizziness, weakness and headaches.   Psychiatric/Behavioral: Negative for depression. The patient is not nervous/anxious.    All other systems reviewed and are negative.       Physical Exam  Temp:  [35.7 °C (96.3 °F)-36.1 °C (97 °F)] 35.9 °C (96.6 °F)  Pulse:  [] 94  Resp:  [9-26] 9  BP: (122-188)/(58-77) 177/74  SpO2:  [92 %-100 %] 98 %    Physical Exam  Vitals signs and nursing note reviewed.   Constitutional:       General: He is not in acute distress.     Appearance: Normal appearance. He is normal weight.   HENT:      Head: Normocephalic and atraumatic.      Right Ear: External ear normal.      Left Ear: External ear normal.      Nose: Nose normal.      Mouth/Throat:      Mouth: Mucous membranes are moist.      Pharynx: Oropharynx is clear.   Eyes:      Extraocular Movements: Extraocular movements intact.      Conjunctiva/sclera: Conjunctivae normal.   Neck:      Musculoskeletal: Normal range of motion and neck supple.   Cardiovascular:      Rate and Rhythm: Normal rate and regular rhythm.      Pulses: Normal pulses.      Heart sounds: Normal heart sounds. No murmur. No friction rub. No gallop.    Pulmonary:      Effort: Pulmonary effort is normal. No respiratory distress.      Breath sounds: Normal breath sounds. No wheezing or rales.   Chest:      Chest wall: No tenderness.   Abdominal:      General: Abdomen is flat. Bowel sounds are normal. There is no distension.      Palpations: Abdomen is soft. There is no mass.      Tenderness: There is no abdominal tenderness. There is no guarding.   Musculoskeletal: Normal range of motion.      Comments: Right above-knee amputation   Skin:     General: Skin is warm.   Neurological:      General: No focal deficit present.      Mental Status: He is alert and oriented to person, place, and time. Mental status is at baseline.      Cranial Nerves: No cranial nerve deficit.      Motor: No weakness.   Psychiatric:         Mood and Affect:  Mood normal.         Behavior: Behavior normal.         Fluids    Intake/Output Summary (Last 24 hours) at 7/7/2020 0857  Last data filed at 7/7/2020 0600  Gross per 24 hour   Intake 2201.67 ml   Output 900 ml   Net 1301.67 ml       Laboratory  Recent Labs     07/04/20 2014 07/05/20  0321 07/05/20 2012 07/06/20 1215 07/06/20 2020 07/07/20  0415   WBC 7.7 8.1 5.9   < > 4.9 7.6 6.8   RBC 3.19* 3.25* 3.26*   < > 3.34* 2.96* 2.97*   HEMOGLOBIN 9.9* 10.0* 10.0*   < > 10.2* 9.0* 9.0*   HEMATOCRIT 29.4* 30.8* 30.3*   < > 32.3* 27.9* 28.6*   MCV 92.2 94.8 92.9   < > 96.7 94.3 96.3   MCH 31.0 30.8 30.7   < > 30.5 30.4 30.3   MCHC 33.7 32.5* 33.0*   < > 31.6* 32.3* 31.5*   RDW 51.8* 53.1* 52.6*   < > 56.5* 54.7* 56.7*   PLATELETCT 188 163* 145*  --  137*  --   --    MPV 10.7 11.4 11.4  --   --   --   --     < > = values in this interval not displayed.     Recent Labs     07/05/20 2012 07/06/20 0410 07/06/20  1215   SODIUM 135 135 137   POTASSIUM 5.2 4.9 4.9   CHLORIDE 107 108 111   CO2 13* 17* 18*   GLUCOSE 48* 130* 503*   BUN 39* 36* 30*   CREATININE 1.70* 1.66* 1.55*   CALCIUM 8.3* 8.0* 7.7*     Recent Labs     07/05/20 2012 07/06/20 0410 07/06/20 1215 07/06/20 2020 07/07/20  0415   APTT 55.7* 59.6* 54.5* 39.4* 39.2*   INR 1.05 1.11  --   --  1.12               Imaging  US-RENAL   Final Result      Unremarkable renal ultrasound.      US-EXTREMITY ARTERY LOWER UNILAT LEFT   Final Result      IR-EXTREMITY ANGIOGRAM-UNILATERAL LEFT    (Results Pending)   IR-ANGIO THROUGH EXISTING CATHETER    (Results Pending)        Assessment/Plan  * Critical lower limb ischemia- (present on admission)  Assessment & Plan  -main diagnosis, likely acute occlusion of the L femoral bypass  Vascular surgery is following, Dr. Culp  Status post angiogram and angioplasty on 7/6/2020.  Heparin drip  Continue aspirin, clopidogrel, rivaroxaban.    Acute renal failure superimposed on stage 3 chronic kidney disease (HCC)- (present on  admission)  Assessment & Plan  -2/2 dehydration?  Improved  -baseline CKD stage III around 1.5  -IVF's  -check bladder scan, renal US  -monitor uop  -hold outpatient nephrotoxic agents, optimize blood pressure    History of tobacco abuse- (present on admission)  Assessment & Plan  -counseled for more than 4 minutes on tobacco cessation [id number]   -I have ordered nicotine replacement therapy  Counseled extensively on smoking and peripheral vascular disease    S/P AKA (above knee amputation), right (HCC)- (present on admission)  Assessment & Plan  -appears well healed    Essential hypertension- (present on admission)  Assessment & Plan  -somewhat elevated given concurrent pain  Kidney function improved to baseline.  Hold outpatient HCTZ.  Continue metoprolol.  Started on losartan.    Dyslipidemia- (present on admission)  Assessment & Plan  Start atorvastatin    PAD (peripheral artery disease) (HCC)- (present on admission)  Assessment & Plan  -severe, US pending  -appears to have re-occlusion of femoral bypass on the LLE  -critical limb ischemia, vascular surgery consulted, Dr. Culp  -IV fentanyl and Oral oxy IR PRN pain  -monitor blood pressure  Status post angiogram and angioplasty with stent placement 7/6.  Continue aspirin, clopidogrel, rivaroxaban    Hyperglycemia  Assessment & Plan  Monitor blood sugars as above.    Hypoglycemia  Assessment & Plan    Results from last 7 days   Lab Units 07/07/20  1116 07/07/20  0540 07/07/20  0012 07/06/20  1912 07/06/20  1512 07/05/20  2211   ACCU CHECK GLUCOSE 788 mg/dL 96 86 77 89 85 61*     I have ordered insulin sliding scale with D50 and glucagon for hypoglycemia per protocol.  Encourage p.o. intake  Diabetic education      Hyperkalemia- (present on admission)  Assessment & Plan  -mild, 2/2 Acute on CKD Stage III  -veltassa x1 and monitor on tele, trend daily    Hyponatremia- (present on admission)  Assessment & Plan  -2/2 hypovolemia presumed  Resolved.  Hold outpatient  HCTZ  -start IVF's with NS and correct slowly, daily NA+    Gastrointestinal prophylaxis: The patient has no risk factors for developing gastric ulcers or gastritis.  As the patient is tolerating oral intake, GI prophylaxis is not indicated at this time.  Antibiotics: None  Diet Order Regular  Code status: Full  Prognosis: Guarded  Risk: The Patient is at HIGH risk for complications and decompensation secondary to his multiple cormorbidities including critical lower limb ischemia requiring angioplasty and multiple blood thinners.  Acute kidney injury on chronic kidney disease.  Hypertension.  Hypoglycemia.    I have personally reviewed notes, labs, vitals, imaging.  I discussed the plan of care with bedside RN as well as on multidisciplinary rounds    VTE prophylaxis: Rivaroxaban

## 2020-07-07 NOTE — CARE PLAN
Problem: Communication  Goal: The ability to communicate needs accurately and effectively will improve  Outcome: PROGRESSING AS EXPECTED     Problem: Safety  Goal: Will remain free from injury  Outcome: PROGRESSING AS EXPECTED  Goal: Will remain free from falls  Outcome: PROGRESSING AS EXPECTED     Problem: Bowel/Gastric:  Goal: Normal bowel function is maintained or improved  Outcome: PROGRESSING SLOWER THAN EXPECTED  Goal: Will not experience complications related to bowel motility  Outcome: PROGRESSING SLOWER THAN EXPECTED     Problem: Knowledge Deficit  Goal: Knowledge of disease process/condition, treatment plan, diagnostic tests, and medications will improve  Outcome: PROGRESSING AS EXPECTED  Goal: Knowledge of the prescribed therapeutic regimen will improve  Outcome: PROGRESSING AS EXPECTED     Problem: Pain Management  Goal: Pain level will decrease to patient's comfort goal  Outcome: PROGRESSING AS EXPECTED     Problem: Skin Integrity  Goal: Risk for impaired skin integrity will decrease  Outcome: PROGRESSING AS EXPECTED     Problem: Respiratory:  Goal: Respiratory status will improve  Outcome: PROGRESSING AS EXPECTED

## 2020-07-07 NOTE — OP REPORT
DATE OF SERVICE:  07/06/2020    PREOPERATIVE DIAGNOSES:  1.  Acute on chronic left lower extremity ischemia with rest pain.  2.  Acute thrombosis of left femoral to popliteal bypass graft.    POSTOPERATIVE DIAGNOSES:  1.  Acute on chronic left lower extremity ischemia with rest pain.  2.  Acute thrombosis of left femoral to popliteal bypass graft.    PROCEDURES:  1.  Left lower extremity angiogram through existing catheter and completion of   thrombolytic therapy.  2.  Stent placement in the left popliteal artery with a 6 mm diameter x 80 mm   long self-expanding uncovered stent and then post-stent angioplasty with a 5   mm plain angioplasty balloon.  3.  Plain balloon angioplasty of the left profunda femoris artery with 5 mm   plain angioplasty balloon.    SURGEON:  Eligio Culp MD    ASSISTANT:  None.    ANESTHESIA:  Local plus sedation.    BLOOD LOSS:  Minimal.    FINDINGS:  Excellent recanalization of the common femoral artery and the   fem-pop bypass graft and good runoff through the popliteal artery down to the   anterior tibial artery to the foot.  Only single vessel runoff to the foot   through the anterior tibial artery.    COMPLICATIONS:  None.    DISPOSITION:  Patient tolerated the procedure well and was sent to recovery in   stable condition.    PROCEDURE:  Following informed consent, the patient was placed supine on the   operating table and IV sedation was given to make the patient comfortable and   the patient was prepped and draped in sterile fashion.  Surgical time-out was   called.    We performed a left lower extremity angiogram through the existing catheter   and sheath in the left leg.  This showed good flow down to the foot.  We did   identify a high-grade stenosis near the origin of the profunda femoris artery   and also a moderate to high grade stenosis in the left above-knee popliteal   artery just distal to the bypass graft anastomosis.  There was no significant   stenosis in the  below-knee popliteal artery or the anterior tibial artery   leading down to the foot and there appeared to be good flow in the pedal   vessels.    At this point, the patient was systemically heparinized.  We treated the   popliteal stenosis with a 6 mm x80 mm self-expanding uncovered stent and then   we performed a post-stent angioplasty with a 5 mm plain angioplasty balloon   and the angiogram showed good technical result with recannulization of this   segment of the popliteal artery.    We then advanced a wire down into the profunda femoris artery.  We treated the   stenosis in the proximal profunda femoris artery with a 5 mm plain   angioplasty balloon.  This did show improved flow through the main trunk of   the profunda femoris artery into the small outflow branches.  I could not use   a larger balloon because the stenosis was very near the initial branching of   the profunda and the smaller branches would not tolerate a larger balloon.    At this point, the sheath was removed and manual pressure was held for 15   minutes to assure hemostasis and also the heparin was reversed with protamine.    The patient had a very brisk Doppler signal in the dorsalis pedis artery at   this point in time.  A dry sterile dressing was placed over the sheath site.    Patient tolerated the procedure well.  He was sent to recovery.    POSTOPERATIVE PLAN:  Patient does not require any additional revascularization   procedures at this point in time.  We will start him on aspirin and Plavix   and also Xarelto or Eliquis to maintain his bypass graft patency and the   patient will follow up with me in clinic for ongoing vascular surveillance.       ____________________________________     MD MARK Rodriguez / JESSICA    DD:  07/06/2020 19:55:28  DT:  07/06/2020 23:50:46    D#:  [id number]  Job#:  [id number]

## 2020-07-08 NOTE — DIETARY
Nutrition Services: Update   Day 4 of admit.  Antonio Walker is a 70 y.o. male with admitting DX of Leg pain, Limb ischemia    Pt is currently on regular diet. Pt states he doesn't have an appetite and he is just not hungry. Pt states his appetite wasn't good 2-3 days prior to admit. Discussed snack options, pt declined. Pt was agreeable to high protein chocolate milkshakes, added BID with lunch and dinner.  Per chart pt PO refused most meals. Wt 7/7: 63.3 kg via bed scale - wt has increased since admit.     Malnutrition Risk from RD note 7/6: Pt with severe wt loss of 12.3% in 6 months. No other criteria noted at this time.      Recommendations/Plan:  1. Encourage intake of meals  2. Document intake of all meals as % taken in ADL's to provide interdisciplinary communication across all shifts.   3. Monitor weight.  4. Nutrition rep will continue to see patient for ongoing meal and snack preferences.  5. Obtain supplement order per RD as needed.    RD following

## 2020-07-08 NOTE — CARE PLAN
Problem: Communication  Goal: The ability to communicate needs accurately and effectively will improve  Outcome: PROGRESSING AS EXPECTED     Problem: Safety  Goal: Will remain free from injury  Outcome: PROGRESSING AS EXPECTED     Problem: Pain Management  Goal: Pain level will decrease to patient's comfort goal  Outcome: PROGRESSING AS EXPECTED     Problem: Skin Integrity  Goal: Risk for impaired skin integrity will decrease  Outcome: PROGRESSING AS EXPECTED

## 2020-07-08 NOTE — PROGRESS NOTES
2 RN skin check completed with LUIS FERNANDO Alvarenga.   Devices in place: 2 PIV, NC, Mepilex, and condom cath.  Skin assessed under devices: yes.  Confirmed pressure ulcers found on: none.  New potential pressure ulcers noted on: none. Wound consult placed: n/a.  The following interventions in place: pressure redistribution mattress, waffle cushion overlay, grey foam ear protectors, condom cath in place, frequent incontinence monitoring and linen changes as needed, barrier cream, pillows for support, Q2 turns, and 2 RN skin check qshift.    L heel: red, blanching, and boggy (mepilex)  LLE: cold and discoloration down to toes  R AKA: stump healed  Sacrum: red, blanching (barrier cream applied)  Back: pink/blanching, and intact  BUE: Bruising and discoloration noted  R hand: scattered scabbing   Elbows: pink/blanching  Ears: red (grey foam added)

## 2020-07-08 NOTE — THERAPY
"Physical Therapy   Initial Evaluation     Patient Name: Antonio Walker  Age:  70 y.o., Sex:  male  Medical Record #: [id number]  Today's Date: 7/8/2020     Precautions: (P) Fall Risk, Weight Bearing As Tolerated Left Lower Extremity    Assessment  Patient is 70 y.o. male with baseline R AKA and now s/p L LE popliteal angio and stent placement. Patient is WC bound at baseline but is able to perform squat pivot transfers to  and is indep with ADLs. Today he is limited by nausea and dizziness and unable to tolerate OOB to  at this time. Anticipate once patient's nausea is better controlled he will be able to transfer to  and ND home with no needs. May need a  with removable arm rests so he can scoot to and from  instead of doing a squat pivot if L LE numbness persists.     Plan    Recommend Physical Therapy 3 times per week until therapy goals are met for the following treatments:  Bed Mobility, Equipment, Neuro Re-Education / Balance, Therapeutic Activities and Therapeutic Exercises    Discharge recommendations:  Anticipate that the patient will have no further physical therapy needs after discharge from the hospital.    Subjective    \"I'm not feeling well today\"     Objective       07/08/20 1055   Precautions   Precautions Fall Risk;Weight Bearing As Tolerated Left Lower Extremity   Comments baseline R AKA   Prior Living Situation   Prior Services None   Housing / Facility Motel   Steps Into Home 0   Steps In Home 0   Bathroom Set up Walk In Shower;Shower Chair;Grab Bars  (shared bathroom )   Equipment Owned Wheelchair  (without removeable arm rests )   Lives with - Patient's Self Care Capacity Alone and Able to Care For Self   Prior Level of Functional Mobility   Bed Mobility Independent   Transfer Status Independent   Ambulation Dependent   Distance Ambulation (Feet)   (non-ambulatory )   Assistive Devices Used Wheelchair   Wheelchair Independent   Comments baseline WC bound. Squat pivot transfers " over to    History of Falls   History of Falls No   Cognition    Cognition / Consciousness X   Speech/ Communication Delayed Responses   Level of Consciousness Alert   Safety Awareness Impaired   Attention Impaired   Comments most likely 2/2 nausea and dizziness    Active ROM Lower Body    Active ROM Lower Body  WDL   Strength Lower Body   Lower Body Strength  X   Comments R AKA, L LE 4-/5 limited by pain    Sensation Lower Body   Lower Extremity Sensation   X   Comments L LE numb distal to mid-thigh    Strength Upper Body   Upper Body Strength  WDL   Coordination Upper Body   Coordination WDL   Balance Assessment   Sitting Balance (Static) Fair +   Sitting Balance (Dynamic) Fair   Weight Shift Sitting Fair   Bed Mobility    Supine to Sit Supervised   Sit to Supine Supervised   Scooting Supervised   Functional Mobility   Sit to Stand Unable to Participate  (2/2 dizziness and nausea )   Bed, Chair, Wheelchair Transfer Unable to Participate  (2/2 dizziness and nausea )   Short Term Goals    Short Term Goal # 1 in 6 visits patient will demo indep for all bed mobility for safe DC home   Short Term Goal # 2 in 6 visits patient will perform all transfers indep for safe DC home   Short Term Goal # 3 in 6 visits patient will self-propel 600' indep for safe community navigation    Education Group   Education Provided Role of Physical Therapist   Role of Physical Therapist Patient Response Patient;Acceptance;Explanation;Demonstration;Verbal Demonstration   Problem List    Problems Impaired Bed Mobility;Impaired Transfers;Impaired Ambulation;Functional Strength Deficit;Impaired Balance;Decreased Activity Tolerance;Pain   Anticipated Discharge Equipment   DC Equipment Unable To Determine At This Time   Session Information   Priority 3  (needs to demo transfer to  for safe DC)       Allie Valle, PT, DPT, GCS

## 2020-07-08 NOTE — THERAPY
Occupational Therapy   Initial Evaluation     Patient Name: Antonio Walker  Age:  70 y.o., Sex:  male  Medical Record #: [id number]  Today's Date: 7/8/2020     Precautions  Precautions: (P) Fall Risk    Assessment  Patient is 70 y.o. male with a diagnosis of Lower limb ischemia.  Pt currently limited by decreased functional mobility, activity tolerance,  and nausea which are affecting pt's ability to complete ADLs/IADLs at baseline. Pt would benefit from OT services in the acute care setting to maximize functional recovery.     Plan    Recommend Occupational Therapy 3 times per week until therapy goals are met for the following treatments:  Self Care/Activities of Daily Living.    Discharge recommendations:  Recommend home health for continued occupational therapy services.          07/08/20 0728   Prior Living Situation   Housing / Facility Motel   Steps Into Home 0   Steps In Home 0   Equipment Owned Wheelchair  (states that he is w/c bound)   Lives with - Patient's Self Care Capacity Alone and Able to Care For Self   Prior Level of ADL Function   Self Feeding Independent   Grooming / Hygiene Independent   Bathing Independent   Dressing Independent   Toileting Independent   ADL Assessment   Grooming Supervision   Upper Body Dressing Supervision   Lower Body Dressing Moderate Assist  (with sock)   Functional Mobility   Sit to Stand Unable to Participate  (2/2 nausea)   Short Term Goals   Short Term Goal # 1 supervised with LB dressing   Session Information   Priority 2

## 2020-07-08 NOTE — CARE PLAN
Problem: Communication  Goal: The ability to communicate needs accurately and effectively will improve  Outcome: PROGRESSING AS EXPECTED  Intervention: Educate patient and significant other/support system about the plan of care, procedures, treatments, medications and allow for questions  Note: Discussed POC including medication administration times, pt acknowledged understanding.     Problem: Venous Thromboembolism (VTW)/Deep Vein Thrombosis (DVT) Prevention:  Goal: Patient will participate in Venous Thrombosis (VTE)/Deep Vein Thrombosis (DVT)Prevention Measures  Outcome: PROGRESSING AS EXPECTED  Intervention: Assess and monitor for anticoagulation complications  Note: Pt is on Xarelto

## 2020-07-08 NOTE — PROGRESS NOTES
2000 pt arrived on unit from ICU. He is A/Ox4, on 2 LPM O2 via NC. Attempted to ween back to RA and pt desat 86%. Will attempt again. Pt c/o pain, medicated per MAR. Pt c/o nausea, medicated per MAR. 2 Episodes of emesis totaling 100 mL. Obtained order for Compazine PRN q6h. Midnight , no coverage required. IVF NS infusing @ 75 mL/hr. Pt in bed for shift has R AKA. Dressing removed from surgical site in L groin area. Pt safe and needs met. Bed low and locked. Pt personal w/c nearby. No signs of acute distress.

## 2020-07-08 NOTE — PROGRESS NOTES
Pt AOx4, pt on 1L oxygen via nc, c/o lower leg pain, scheduled pain medications given as ordered. Pt had one episode of emesis this morning, PRN zofran given x2 throughout shift will positive effect. MIVF infusing, tolerating well. Condom cath in place. BS treated per order (see MAR). All other needs met at this time. Bed locked in lowest position, call light and personal belongings within reach. Safety maintained. Hourly rounding completed, will continue to monitor.

## 2020-07-08 NOTE — CARE PLAN
Problem: Nutritional:  Goal: Achieve adequate nutritional intake  Description: Patient will consume 50% of meals  Outcome: NOT MET

## 2020-07-09 NOTE — DISCHARGE PLANNING
"Hospital Care Management Note        Action:   · Spoke with patient to obtain choice for HH. He states he's not sure but thinks he used home health prior to this admission and asked to call his sister.   · Called sister, Liza. She said the patient had a caregiver through [employer] ([phone]) but it was not a home health agency.  · Obtained choice and faxed form to [facility]:  · 1. [facility], 2. [facility], 3. [facility]    Plan:  · Awaiting acceptance with HH and will notify MD when accepted.        Thank you for allowing me the pleasure of participating in this patient's care coordination and discharge planning.    For further assistance please contact the assigned RN Case Manager or  at the extension listed under \"Treatment Teams\".      "

## 2020-07-09 NOTE — FACE TO FACE
Face to Face Supporting Documentation - Home Health    The encounter with this patient was in whole or in part the primary reason for home health admission.    Date of encounter:   Patient:                    MRN:                       YOB: 2020  Antonio Walker  [id number]  1950     Home health to see patient for:  Skilled Nursing care for assessment, interventions & education    Skilled need for:  Comment: weakness    Skilled nursing interventions to include:  Comment: PT/OT    Homebound status evidenced by:  Need the aid of supportive devices such as crutches, canes, wheelchairs or walkers. Leaving home requires a considerable and taxing effort. There is a normal inability to leave the home.    Community Physician to provide follow up care: Amy Almaraz M.D.     Optional Interventions? No      I certify the face to face encounter for this home health care referral meets the CMS requirements and the encounter/clinical assessment with the patient was, in whole, or in part, for the medical condition(s) listed above, which is the primary reason for home health care. Based on my clinical findings: the service(s) are medically necessary, support the need for home health care, and the homebound criteria are met.  I certify that this patient has had a face to face encounter by myself.  Du Noble M.D. - NPI: [id number]

## 2020-07-09 NOTE — PROGRESS NOTES
Bradley from Lab called with critical result of Hgb 6.0 to Charge RN. Critical lab result read back to Bradley.   Dr. Adler notified of critical lab result Hgb at 6.0.  Critical lab result read back by Dr. Adler.    Received orders of Hgb redraw and to transfuse 1 unit of RBC's if Hgb <7.    Redraw Hgb result 6.3. COD and RBC's ordered. Consent obtained.

## 2020-07-09 NOTE — PROGRESS NOTES
Hospital Medicine Daily Progress Note    Date of Service  7/9/2020    Chief Complaint  70 y.o. male admitted 7/4/2020 with left lower extremity pain    Hospital Course    Mr. Antonio Walker is a 70 y.o. male with history of severe peripheral arterial disease with prior femoral artery bypass occlusions who presented on 7/4/2020 with acute left leg pain x3 days.  Pain starts in the mid thigh and goes all the way down to the distal feet and toes.  Also reports associated weakness and numbness.  Son confirms thrombosis vascular surgery Dr. Culp was consulted for critical limb ischemia.  Patient was started on a heparin drip and later TPA.  Status post lower extremity angiogram with stent placement in the left popliteal artery.  Patient was started on clopidogrel, aspirin, rivaroxaban.          Interval Problem Update  No acute issue overnight.  Vascular surgery stated that he can be discharged and follow-up with him as an outpatient.  Home health is ordered.      Consultants/Specialty  Vascular surgery  Critical care    Code Status  FULL    Disposition  Likely to home in the next few days.    Review of Systems  Review of Systems   Constitutional: Negative for chills and fever.   HENT: Negative for congestion and sore throat.    Eyes: Negative for blurred vision.   Respiratory: Negative for cough and shortness of breath.    Cardiovascular: Negative for palpitations and leg swelling.   Gastrointestinal: Negative for abdominal pain, nausea and vomiting.   Genitourinary: Negative for frequency and urgency.   Musculoskeletal: Positive for myalgias (Left lower extremity). Negative for falls.   Skin: Negative for rash.   Neurological: Negative for dizziness and headaches.   Psychiatric/Behavioral: The patient is not nervous/anxious.    All other systems reviewed and are negative.       Physical Exam  Temp:  [35.9 °C (96.6 °F)-36.8 °C (98.2 °F)] 36.8 °C (98.2 °F)  Pulse:  [] 98  Resp:  [16-17] 17  BP:  (102-130)/(60-79) 125/79  SpO2:  [91 %-100 %] 100 %    Physical Exam  Vitals signs and nursing note reviewed.   Constitutional:       Appearance: Normal appearance. He is normal weight.   HENT:      Head: Normocephalic and atraumatic.      Right Ear: External ear normal.      Left Ear: External ear normal.      Nose: Nose normal.      Mouth/Throat:      Mouth: Mucous membranes are moist.      Pharynx: Oropharynx is clear.   Eyes:      Extraocular Movements: Extraocular movements intact.      Conjunctiva/sclera: Conjunctivae normal.   Neck:      Musculoskeletal: Normal range of motion and neck supple.   Cardiovascular:      Rate and Rhythm: Normal rate and regular rhythm.      Pulses: Normal pulses.      Heart sounds: Normal heart sounds. No murmur.   Pulmonary:      Effort: Pulmonary effort is normal. No respiratory distress.      Breath sounds: Normal breath sounds. No wheezing.   Abdominal:      General: Abdomen is flat. Bowel sounds are normal. There is no distension.      Palpations: Abdomen is soft. There is no mass.   Musculoskeletal: Normal range of motion.      Comments: Right above-knee amputation   Skin:     General: Skin is warm.   Neurological:      Mental Status: He is alert.         Fluids    Intake/Output Summary (Last 24 hours) at 7/9/2020 1030  Last data filed at 7/9/2020 0700  Gross per 24 hour   Intake 466 ml   Output 700 ml   Net -234 ml       Laboratory  Recent Labs     07/08/20  0526 07/09/20  0113 07/09/20  0240 07/09/20  0808   WBC 8.7 5.8  --  7.0   RBC 2.56* 1.90*  --  2.41*   HEMOGLOBIN 7.8* 6.0* 6.3* 7.5*   HEMATOCRIT 24.9* 18.4*  --  23.2*   MCV 97.3 96.3  --  96.3   MCH 30.5 31.1  --  31.1   MCHC 31.3* 32.2*  --  32.3*   RDW 58.0* 57.5*  --  55.9*   PLATELETCT 180 148*  --  151*   MPV 11.3 10.9  --  11.3     Recent Labs     07/07/20  1120 07/08/20  0526 07/09/20  0113   SODIUM 140 142 137   POTASSIUM 5.4 5.4 4.9   CHLORIDE 112 112 113*   CO2 19* 19* 18*   GLUCOSE 106* 140* 181*   BUN  30* 38* 57*   CREATININE 1.53* 1.68* 1.74*   CALCIUM 8.5 8.3* 7.9*     Recent Labs     07/06/20  1215 07/06/20 2020 07/07/20  0415   APTT 54.5* 39.4* 39.2*   INR  --   --  1.12               Imaging  US-RENAL   Final Result      Unremarkable renal ultrasound.      US-EXTREMITY ARTERY LOWER UNILAT LEFT   Final Result      IR-EXTREMITY ANGIOGRAM-UNILATERAL LEFT    (Results Pending)   IR-ANGIO THROUGH EXISTING CATHETER    (Results Pending)        Assessment/Plan  * Critical lower limb ischemia- (present on admission)  Assessment & Plan  -main diagnosis, likely acute occlusion of the L femoral bypass  Vascular surgery is following, Dr. Culp  Status post angiogram and angioplasty on 7/6/2020.  Continue aspirin, clopidogrel, rivaroxaban.    Acute renal failure superimposed on stage 3 chronic kidney disease (HCC)- (present on admission)  Assessment & Plan  -2/2 dehydration?  Improved  -baseline CKD stage III around 1.5  -IVF's  -check bladder scan, renal US  -monitor uop  -hold outpatient nephrotoxic agents, optimize blood pressure    History of tobacco abuse- (present on admission)  Assessment & Plan  -counseled for more than 4 minutes on tobacco cessation 18603   -I have ordered nicotine replacement therapy  Counseled extensively on smoking and peripheral vascular disease    S/P AKA (above knee amputation), right (HCC)- (present on admission)  Assessment & Plan  -appears well healed    Essential hypertension- (present on admission)  Assessment & Plan  -somewhat elevated given concurrent pain  Kidney function improved to baseline.  Hold outpatient HCTZ.  Continue metoprolol.  Started on losartan.    Dyslipidemia- (present on admission)  Assessment & Plan  Start atorvastatin    PAD (peripheral artery disease) (HCC)- (present on admission)  Assessment & Plan  -severe, US pending  -appears to have re-occlusion of femoral bypass on the LLE  -critical limb ischemia, vascular surgery consulted, Dr. Culp  -IV fentanyl and  Oral oxy IR PRN pain  -monitor blood pressure  Status post angiogram and angioplasty with stent placement 7/6.  Continue aspirin, clopidogrel, rivaroxaban    Hyperglycemia  Assessment & Plan  Monitor blood sugars as above.    Hypoglycemia  Assessment & Plan    Results from last 7 days   Lab Units 07/07/20  1116 07/07/20  0540 07/07/20  0012 07/06/20  1912 07/06/20  1512 07/05/20  2211   ACCU CHECK GLUCOSE 788 mg/dL 96 86 77 89 85 61*     I have ordered insulin sliding scale with D50 and glucagon for hypoglycemia per protocol.  Encourage p.o. intake  Diabetic education      Hyperkalemia- (present on admission)  Assessment & Plan  -mild, 2/2 Acute on CKD Stage III  -veltassa x1 and monitor on tele, trend daily    Hyponatremia- (present on admission)  Assessment & Plan  -2/2 hypovolemia presumed  Resolved.  Hold outpatient HCTZ  -start IVF's with NS and correct slowly, daily NA+      I have personally reviewed notes, labs, vitals, imaging.  I discussed the plan of care with bedside RN as well as on multidisciplinary rounds    VTE prophylaxis: Rivaroxaban

## 2020-07-09 NOTE — CARE PLAN
Problem: Safety  Goal: Will remain free from injury  Outcome: PROGRESSING AS EXPECTED       Problem: Skin Integrity  Goal: Risk for impaired skin integrity will decrease  Outcome: PROGRESSING AS EXPECTED

## 2020-07-09 NOTE — PROGRESS NOTES
Vascular    Excellent reperfusion of the left foot via the AT  On ASA 81, Plavix, and Xarelto 2.5  PT/OT    Discharge once cleared by PT and medicine, may need SNF  Outpatient follow-up with me for ongoing vascular care    Very much appreciate the hospitalist service's support managing  Walker    Eligio Culp MD  [facility] (General and Vascular Surgery)  Cell: [phone] (text or call is fine, if you don't reach me please try my office)  Office: [phone]  __________________________________________________________________  Patient:Antonio Meneses Aaron   MRN:[id number]   CSN:[id number]

## 2020-07-09 NOTE — PROGRESS NOTES
2 RN skin check completed with Zoe RN.   Devices in place: PIV, oxygen tubing, condom cath.  Skin assessed under devices: yes.  Confirmed pressure ulcers found on: n/a.  New potential pressure ulcers noted on: n/a  Wound consult placed: n/a.    The following interventions in place: Waffle overlay mattress in place, frequent position changes and Q2hr turns, pillows for protection, foam ear protectors on oxygen tubing, incontinence care, condom cath in place, barrier cream, mepilex.     Bilateral elbows pink/blanching, bruising and discoloration noted to BUE, scabbing to R hand, L heel red/blanching/boggy, R heel red/blanching, LLE cold and discoloration noted to toes, R AKA healed stump, sacrum red/blanching

## 2020-07-09 NOTE — PROGRESS NOTES
Pt AOx4,lethargic at beginning of shift but became more alert mid morning, remains on 1L oxygen via NC, c/o lower leg pain, scheduled pain medications given as ordered. MIVF infusing, tolerating well. Condom cath in place. BS treated per order (see MAR). Continues to have poor PO intake, encouragement provided. Boost supplements added to diet order. Sleeping in between care. All other needs met at this time. Bed locked in lowest position, call light and personal belongings within reach. Safety maintained. Hourly rounding completed, will continue to monitor.

## 2020-07-09 NOTE — PROGRESS NOTES
Hospital Medicine Daily Progress Note    Date of Service  7/8/2020    Chief Complaint  70 y.o. male admitted 7/4/2020 with left lower extremity pain    Hospital Course    Mr. Antonio Walker is a 70 y.o. male with history of severe peripheral arterial disease with prior femoral artery bypass occlusions who presented on 7/4/2020 with acute left leg pain x3 days.  Pain starts in the mid thigh and goes all the way down to the distal feet and toes.  Also reports associated weakness and numbness.  Son confirms thrombosis vascular surgery Dr. Culp was consulted for critical limb ischemia.  Patient was started on a heparin drip and later TPA.  Status post lower extremity angiogram with stent placement in the left popliteal artery.  Patient was started on clopidogrel, aspirin, rivaroxaban.          Interval Problem Update  Patient was seen and examined at bedside.  I have personally reviewed vitals, labs, and imaging.    7/8.  No acute overnight events although he does endorse some fairly new onset nausea and emesis.  Denies fever, chills, chest pain, shortness of breath.  Left lower extremity pain currently well controlled.      Consultants/Specialty  Vascular surgery  Critical care    Code Status  FULL    Disposition  Likely to home in the next few days.    Review of Systems  Review of Systems   Constitutional: Negative for chills and fever.   HENT: Negative for congestion and sore throat.    Eyes: Negative for blurred vision.   Respiratory: Negative for cough and shortness of breath.    Cardiovascular: Negative for chest pain, palpitations and leg swelling.   Gastrointestinal: Negative for abdominal pain, constipation, diarrhea, nausea and vomiting.   Genitourinary: Negative for dysuria, frequency and urgency.   Musculoskeletal: Positive for myalgias (Left lower extremity). Negative for falls.   Skin: Negative for rash.   Neurological: Negative for dizziness, weakness and headaches.   Psychiatric/Behavioral:  Negative for depression. The patient is not nervous/anxious.    All other systems reviewed and are negative.       Physical Exam  Temp:  [36.1 °C (97 °F)-37 °C (98.6 °F)] 36.4 °C (97.5 °F)  Pulse:  [] 105  Resp:  [16-20] 16  BP: (113-165)/(52-72) 113/64  SpO2:  [91 %-100 %] 91 %    Physical Exam  Vitals signs and nursing note reviewed.   Constitutional:       General: He is not in acute distress.     Appearance: Normal appearance. He is normal weight.   HENT:      Head: Normocephalic and atraumatic.      Right Ear: External ear normal.      Left Ear: External ear normal.      Nose: Nose normal.      Mouth/Throat:      Mouth: Mucous membranes are moist.      Pharynx: Oropharynx is clear.   Eyes:      Extraocular Movements: Extraocular movements intact.      Conjunctiva/sclera: Conjunctivae normal.   Neck:      Musculoskeletal: Normal range of motion and neck supple.   Cardiovascular:      Rate and Rhythm: Normal rate and regular rhythm.      Pulses: Normal pulses.      Heart sounds: Normal heart sounds. No murmur. No friction rub. No gallop.    Pulmonary:      Effort: Pulmonary effort is normal. No respiratory distress.      Breath sounds: Normal breath sounds. No wheezing or rales.   Chest:      Chest wall: No tenderness.   Abdominal:      General: Abdomen is flat. Bowel sounds are normal. There is no distension.      Palpations: Abdomen is soft. There is no mass.      Tenderness: There is no abdominal tenderness. There is no guarding.   Musculoskeletal: Normal range of motion.      Comments: Right above-knee amputation   Skin:     General: Skin is warm.   Neurological:      General: No focal deficit present.      Mental Status: He is alert and oriented to person, place, and time. Mental status is at baseline.      Cranial Nerves: No cranial nerve deficit.      Motor: No weakness.   Psychiatric:         Mood and Affect: Mood normal.         Behavior: Behavior normal.         Fluids    Intake/Output Summary  (Last 24 hours) at 7/8/2020 1704  Last data filed at 7/8/2020 1400  Gross per 24 hour   Intake 470 ml   Output 500 ml   Net -30 ml       Laboratory  Recent Labs     07/05/20 2012 07/06/20 1215 07/06/20 2020 07/07/20  0415 07/08/20  0526   WBC 5.9   < > 4.9 7.6 6.8 8.7   RBC 3.26*   < > 3.34* 2.96* 2.97* 2.56*   HEMOGLOBIN 10.0*   < > 10.2* 9.0* 9.0* 7.8*   HEMATOCRIT 30.3*   < > 32.3* 27.9* 28.6* 24.9*   MCV 92.9   < > 96.7 94.3 96.3 97.3   MCH 30.7   < > 30.5 30.4 30.3 30.5   MCHC 33.0*   < > 31.6* 32.3* 31.5* 31.3*   RDW 52.6*   < > 56.5* 54.7* 56.7* 58.0*   PLATELETCT 145*  --  137*  --   --  180   MPV 11.4  --   --   --   --  11.3    < > = values in this interval not displayed.     Recent Labs     07/06/20 1215 07/07/20 1120 07/08/20  0526   SODIUM 137 140 142   POTASSIUM 4.9 5.4 5.4   CHLORIDE 111 112 112   CO2 18* 19* 19*   GLUCOSE 503* 106* 140*   BUN 30* 30* 38*   CREATININE 1.55* 1.53* 1.68*   CALCIUM 7.7* 8.5 8.3*     Recent Labs     07/05/20 2012 07/06/20 0410 07/06/20 1215 07/06/20 2020 07/07/20  0415   APTT 55.7* 59.6* 54.5* 39.4* 39.2*   INR 1.05 1.11  --   --  1.12               Imaging  US-RENAL   Final Result      Unremarkable renal ultrasound.      US-EXTREMITY ARTERY LOWER UNILAT LEFT   Final Result      IR-EXTREMITY ANGIOGRAM-UNILATERAL LEFT    (Results Pending)   IR-ANGIO THROUGH EXISTING CATHETER    (Results Pending)        Assessment/Plan  * Critical lower limb ischemia- (present on admission)  Assessment & Plan  -main diagnosis, likely acute occlusion of the L femoral bypass  Vascular surgery is following, Dr. Culp  Status post angiogram and angioplasty on 7/6/2020.  Continue aspirin, clopidogrel, rivaroxaban.    Acute renal failure superimposed on stage 3 chronic kidney disease (HCC)- (present on admission)  Assessment & Plan  -2/2 dehydration?  Improved  -baseline CKD stage III around 1.5  -IVF's  -check bladder scan, renal US  -monitor uop  -hold outpatient nephrotoxic agents,  optimize blood pressure    History of tobacco abuse- (present on admission)  Assessment & Plan  -counseled for more than 4 minutes on tobacco cessation 81482   -I have ordered nicotine replacement therapy  Counseled extensively on smoking and peripheral vascular disease    S/P AKA (above knee amputation), right (HCC)- (present on admission)  Assessment & Plan  -appears well healed    Essential hypertension- (present on admission)  Assessment & Plan  -somewhat elevated given concurrent pain  Kidney function improved to baseline.  Hold outpatient HCTZ.  Continue metoprolol.  Started on losartan.    Dyslipidemia- (present on admission)  Assessment & Plan  Start atorvastatin    PAD (peripheral artery disease) (HCC)- (present on admission)  Assessment & Plan  -severe, US pending  -appears to have re-occlusion of femoral bypass on the LLE  -critical limb ischemia, vascular surgery consulted, Dr. Culp  -IV fentanyl and Oral oxy IR PRN pain  -monitor blood pressure  Status post angiogram and angioplasty with stent placement 7/6.  Continue aspirin, clopidogrel, rivaroxaban    Hyperglycemia  Assessment & Plan  Monitor blood sugars as above.    Hypoglycemia  Assessment & Plan    Results from last 7 days   Lab Units 07/07/20  1116 07/07/20  0540 07/07/20  0012 07/06/20  1912 07/06/20  1512 07/05/20  2211   ACCU CHECK GLUCOSE 788 mg/dL 96 86 77 89 85 61*     I have ordered insulin sliding scale with D50 and glucagon for hypoglycemia per protocol.  Encourage p.o. intake  Diabetic education      Hyperkalemia- (present on admission)  Assessment & Plan  -mild, 2/2 Acute on CKD Stage III  -veltassa x1 and monitor on tele, trend daily    Hyponatremia- (present on admission)  Assessment & Plan  -2/2 hypovolemia presumed  Resolved.  Hold outpatient HCTZ  -start IVF's with NS and correct slowly, daily NA+    Gastrointestinal prophylaxis: The patient has no risk factors for developing gastric ulcers or gastritis.  As the patient is  tolerating oral intake, GI prophylaxis is not indicated at this time.  Antibiotics: None  Diet Order Regular  Code status: Full  Prognosis: Guarded  Risk: The Patient is at HIGH risk for complications and decompensation secondary to his multiple cormorbidities including critical lower limb ischemia requiring angioplasty and multiple blood thinners.  Acute kidney injury on chronic kidney disease.  Hypertension.  Hypoglycemia.    I have personally reviewed notes, labs, vitals, imaging.  I discussed the plan of care with bedside RN as well as on multidisciplinary rounds    VTE prophylaxis: Rivaroxaban

## 2020-07-09 NOTE — DISCHARGE PLANNING
Hospital Care Management Note        Action:   · Pt accepted to [facility] . Pt expected to D/C home per Dr. Noble. Updated BS RN.

## 2020-07-09 NOTE — DISCHARGE PLANNING
We are currently verifying MD acceptance of your patient. This will be resolved ASAP.    Thank you for your patience.  Respectfully,   [facility]

## 2020-07-09 NOTE — DISCHARGE PLANNING
Received Choice form at 5960  Agency/Facility Name: [facility]  Referral sent per Choice form @ [id number]

## 2020-07-09 NOTE — PROGRESS NOTES
2 RN skin check completed with Patricia GOULD.   Devices in place: PIV, condom cath.  Skin assessed under devices: yes.  Confirmed pressure ulcers found on: n/a.  New potential pressure ulcers noted on: n/a  Wound consult placed: n/a.     The following interventions in place: Waffle overlay mattress in place, frequent position changes and Q2hr turns, pillows for protection, foam ear protectors on oxygen tubing, incontinence care, condom cath in place, barrier cream, mepilex to left heel, 2 RN skin checks    Bilateral elbows pink/blanching, bruising and discoloration noted to BUE, scabbing to R hand, L heel blanching/boggy, LLE cold and discoloration noted to toes, R AKA healed stump, sacrum red/blanching

## 2020-07-10 NOTE — PROGRESS NOTES
Pt alert and oriented x 4 this shift, VS stable afebrile, patient required blood transfusion for hemoglobin of 6.7. No signs of transfusion reaction no obvious signs of bleeding.     Awaiting stool sample. Patient educated on blood transfusion and signs of transfusion reaction. No reports of pain, slept comfortably throughout the night. Will continue to monitor and support

## 2020-07-10 NOTE — PROGRESS NOTES
" requesting FOB sample be collected. Patient states he has a long history with constipation; \"my whole family does\". Call from patient's sister stating that in the past they were unable to perform a colonoscopy because of the patient's \"bent sigmoid\". MOM given this AM. Awaiting results. MD aware of no BM.   "

## 2020-07-10 NOTE — THERAPY
"Missed Therapy     Patient Name: Antonio Walker  Age:  70 y.o., Sex:  male  Medical Record #: [id number]  Today's Date: 7/10/2020    Upon arrival pt reporting he is feeling nauseous and too much pain to participate. Educated on importance of mobility and practicing stand pivot transfers into his wheelchair. He reports he didn't have issues prior. Discussed how laying in bed can impact mobility and strength at this time. He continued to defer to practice. Pt reporting he lives alone and doesn't have assist at home but is \" not worried\". Will continue to follow while in house.     RN notified and aware   "

## 2020-07-10 NOTE — CARE PLAN
Problem: Nutritional:  Goal: Achieve adequate nutritional intake  Description: Patient will consume 50% of meals  Outcome: NOT MET   See dietary note. RD following.

## 2020-07-10 NOTE — PROGRESS NOTES
Discharge order received. Patient Hgb 6.7. Dr Noble paged to clarify DC plan. No orders to transfuse blood. MD would like FOB sent prior to DC. MD ok to hold anticoags until FOB results. No BM in 2 days. Will give BM protocol.

## 2020-07-10 NOTE — DISCHARGE SUMMARY
Discharge Summary    CHIEF COMPLAINT ON ADMISSION  Chief Complaint   Patient presents with   • Leg Pain     left leg       Reason for Admission  EMS     Admission Date  7/4/2020    CODE STATUS  Full Code    HPI & HOSPITAL COURSE     Mr. Antonio Walker is a 70 y.o. male with history of severe peripheral arterial disease with prior femoral artery bypass occlusions who presented on 7/4/2020 with acute left leg pain x3 days.  Pain starts in the mid thigh and goes all the way down to the distal feet and toes.  Also reports associated weakness and numbness.  Son confirms thrombosis vascular surgery Dr. Culp was consulted for critical limb ischemia.  Patient was started on a heparin drip and later TPA.  Status post lower extremity angiogram with stent placement in the left popliteal artery.  Patient was started on clopidogrel, aspirin, rivaroxaban.  Per vascular surgery the patient can be discharged home and follow-up with him as an outpatient.  Patient was evaluated by PT and OT and recommended rehab which was arranged upon discharge.  I saw and examined the patient upon discharge.  Please call [phone] to schedule PCP appointment for patient.    Required specialty appointments include:     DR. Culp in 1 week       Therefore, he is discharged in fair and stable condition to home with close outpatient follow-up.    The patient met 2-midnight criteria for an inpatient stay at the time of discharge.    Discharge Date  07/10/20      FOLLOW UP ITEMS POST DISCHARGE      DISCHARGE DIAGNOSES  Principal Problem:    Critical lower limb ischemia POA: Yes  Active Problems:    Acute renal failure superimposed on stage 3 chronic kidney disease (HCC) POA: Yes    PAD (peripheral artery disease) ([facility]) POA: Yes    Dyslipidemia POA: Yes    Essential hypertension POA: Yes    S/P AKA (above knee amputation), right ([facility]) POA: Yes    History of tobacco abuse POA: Yes    Hyponatremia POA: Yes    Hyperkalemia POA: Yes     Hypoglycemia POA: Unknown    Hyperglycemia POA: Unknown  Resolved Problems:    * No resolved hospital problems. *      FOLLOW UP  No future appointments.  Eligio Culp M.D.  [address]  [address]  [facility] [address] [phone]    Schedule an appointment as soon as possible for a visit in 2 weeks  For Progress Check    Amy Almaraz M.D.  [address]  [facility] [id number] [phone]    In 1 week        MEDICATIONS ON DISCHARGE     Medication List      START taking these medications      Instructions   aspirin 81 MG EC tablet  Start taking on:  July 11, 2020   Take 1 Tab by mouth every day.  Dose:  81 mg     atorvastatin 40 MG Tabs  Commonly known as:  LIPITOR   Take 1 Tab by mouth every evening.  Dose:  40 mg     clopidogrel 75 MG Tabs  Start taking on:  July 11, 2020  Commonly known as:  PLAVIX   Take 1 Tab by mouth every day.  Dose:  75 mg     losartan 25 MG Tabs  Start taking on:  July 11, 2020  Commonly known as:  COZAAR   Take 1 Tab by mouth every day.  Dose:  25 mg     omeprazole 20 MG delayed-release capsule  Start taking on:  July 11, 2020  Commonly known as:  PRILOSEC   Take 1 Cap by mouth every day.  Dose:  20 mg     rivaroxaban 2.5 MG tablet  Commonly known as:  Xarelto   Take 1 Tab by mouth 2 Times a Day.  Dose:  2.5 mg        CONTINUE taking these medications      Instructions   gabapentin 300 MG Caps  Commonly known as:  NEURONTIN   Take 300 mg by mouth 3 times a day.  Dose:  300 mg     hydroCHLOROthiazide 25 MG Tabs  Commonly known as:  HYDRODIURIL   Take 25 mg by mouth every day.  Dose:  25 mg     metoprolol SR 50 MG Tb24  Commonly known as:  TOPROL XL   Take 50 mg by mouth every day.  Dose:  50 mg     tamsulosin 0.4 MG capsule  Commonly known as:  FLOMAX   Take 1 Cap by mouth ONE-HALF HOUR AFTER BREAKFAST.  Dose:  0.4 mg            Allergies  No Known Allergies    DIET  Orders Placed This Encounter   Procedures   • Diet Order Regular (please add boost with each meal)      Standing Status:   Standing     Number of Occurrences:   1     Order Specific Question:   Diet:     Answer:   Regular [1]     Comments:   please add boost with each meal       ACTIVITY  As tolerated.  Weight bearing as tolerated    CONSULTATIONS  As above    PROCEDURES      LABORATORY  Lab Results   Component Value Date    SODIUM 137 07/09/2020    POTASSIUM 4.9 07/09/2020    CHLORIDE 113 (H) 07/09/2020    CO2 18 (L) 07/09/2020    GLUCOSE 181 (H) 07/09/2020    BUN 57 (H) 07/09/2020    CREATININE 1.74 (H) 07/09/2020        Lab Results   Component Value Date    WBC 5.7 07/10/2020    HEMOGLOBIN 6.7 (L) 07/10/2020    HEMATOCRIT 21.3 (L) 07/10/2020    PLATELETCT 136 (L) 07/10/2020        Total time of the discharge process exceeds 38 minutes.

## 2020-07-10 NOTE — CARE PLAN
Problem: Safety  Goal: Will remain free from injury  Outcome: PROGRESSING AS EXPECTED    Fall precautions in place, frequent rounding in place, patient free from falls this shift      Problem: Mobility  Goal: Risk for activity intolerance will decrease  Outcome: PROGRESSING AS EXPECTED   Pt able to turn self side to side, able to move all extremities

## 2020-07-10 NOTE — DIETARY
"Nutrition Services Update: following for adequate nutritional intake    Day 5 of admit. Regular diet. ADL documentation shows intake has been 0% for all meals since admit aside from 2.    Visited bedside, noted pt with hollow/scooping temporal lobes and sunken eyes evidencing severe fat loss. Assess clavicles, however bone not overly protruding. Pt denies ongoing vomiting or nausea, states that he isn't able to \"get anything down\". Pt states that he has tried Boost supplements however dislikes them. Offered for pt to try Magic Cups, he did not seem eager to try however. Encouraged pt to try and increase oral intake today d/t prolonged inadequate nutrition. In addition to 5 days of admit pt states that he was eating poorly for ~1 week PTA as well.     In addition, Nutrition Representative who visits daily for meal selection reports that for the past 2 days pt has declined to go over meals and is requesting broth only d/t feeling poorly.    Meds: PRN compazine ordered and last used this morning although pt declined nausea when asked by RD    Malnutrition Risk: Pt with severe malnutrition, evidenced by severe weight loss of 12.3% in past 6 months, severe fat loss (scooping temporal lobes and sunken eyes), and inadequate nutritional intake x 2 weeks (< 50% of needs for > 5 days).     Plan/Recommend:   1. Switch Boost to Magic Cup supplements due to pt reporting dislike to Boost  2. Inadequate nutrition x 2 weeks per ADL documentation and pt report  3. Discussed concerns with MD who is agreeable to starting an appetite stimulant   4. If intake does not improve over the next 2-3 days pt likely to benefit from nutrition support   5. Encourage PO intake of meals and supplements  6. Please record intake as % meals and supplements consumed  7. Nutrition representative to see daily for meal selection  8. RD will continue to monitor     RD following     "

## 2020-07-10 NOTE — DISCHARGE PLANNING
"Hospital Care Management Discharge Planning        Action:   · Discharge on hold for today.  · BS RN notified RNCM of hemoglobin trending down.  · [facility] accepted patient yesterday.     Thank you for allowing me the pleasure of participating in this patient's care coordination and discharge planning.      For further assistance please contact the assigned RN Case Manager or  at the extension listed under \"Treatment Teams\".       "

## 2020-07-11 PROBLEM — D64.9 NORMOCYTIC ANEMIA: Status: ACTIVE | Noted: 2020-01-01

## 2020-07-11 NOTE — PROGRESS NOTES
2 RN skin check complete with Yumiko RN   Devices in place PIV, Condom cath  Skin assessed under devices yes.  Confirmed pressure ulcers found on n/a.  New potential pressure ulcers noted on n/a  Wound consult placed n/a.      The following interventions in place [id number], 2RN skin check, pillows for positioning, mepilex in place.     Bilateral elbows pink/blanching, bruising noted to bilateral upper extremities, R hand discoloration. L heel blanching/red. R AKA healed stump. Sacrum red/slow to nisha, mepilex in place.

## 2020-07-11 NOTE — CARE PLAN
Problem: Knowledge Deficit  Goal: Knowledge of disease process/condition, treatment plan, diagnostic tests, and medications will improve  Outcome: PROGRESSING AS EXPECTED     Problem: Mobility  Goal: Risk for activity intolerance will decrease  Outcome: PROGRESSING AS EXPECTED

## 2020-07-11 NOTE — PROGRESS NOTES
Pt is A&Ox4. Pt is resting in bed, no signs of labored breathing or pain. Pt on RA. Call light & personal belongings within reach, bed in lowest position & locked, and bed alarm is on. Fall precautions in place and education provided on how to use call light. Pt running NS at 75 mL/hr. Pt updated on plan of care for the shift. Pt declines any additional needs at this time.

## 2020-07-11 NOTE — PROGRESS NOTES
Hospital Medicine Daily Progress Note    Date of Service  7/11/2020      Chief Complaint  70 y.o. male admitted 7/4/2020 with left lower extremity pain    Hospital Course    Mr. Antonio Walker is a 70 y.o. male with history of severe peripheral arterial disease with prior femoral artery bypass occlusions who presented on 7/4/2020 with acute left leg pain x3 days.  Pain starts in the mid thigh and goes all the way down to the distal feet and toes.  Also reports associated weakness and numbness.  Son confirms thrombosis vascular surgery Dr. Culp was consulted for critical limb ischemia.  Patient was started on a heparin drip and later TPA.  Status post lower extremity angiogram with stent placement in the left popliteal artery.  Patient was started on clopidogrel, aspirin, rivaroxaban.          Interval Problem Update  7/10 No acute issue overnight.  Vascular surgery stated that he can be discharged and follow-up with him as an outpatient.  Home health is ordered.    7/11 I updated him about his hemoglobin this morning.  Patient is yet to have bowel movement to have occult blood test.  Need to rule that out before he get discharge to SNF.    Review of Systems  Review of Systems   Constitutional: Positive for malaise/fatigue. Negative for chills, fever and weight loss.   HENT: Negative for congestion and nosebleeds.    Eyes: Negative for blurred vision, pain, discharge and redness.   Respiratory: Negative for cough, sputum production, shortness of breath and stridor.    Cardiovascular: Negative for chest pain, palpitations and orthopnea.   Gastrointestinal: Negative for abdominal pain, diarrhea, heartburn, nausea and vomiting.   Genitourinary: Negative for dysuria, frequency and urgency.   Musculoskeletal: Positive for myalgias. Negative for back pain and neck pain.   Skin: Negative for itching and rash.   Neurological: Negative for dizziness, focal weakness, seizures and headaches.    Psychiatric/Behavioral: Negative for depression. The patient is not nervous/anxious and does not have insomnia.         Physical Exam  Temp:  [36.7 °C (98 °F)-36.7 °C (98.1 °F)] 36.7 °C (98 °F)  Pulse:  [] 99  Resp:  [16-19] 18  BP: (107-132)/(51-76) 132/75  SpO2:  [91 %-94 %] 91 %    Physical Exam  Vitals signs reviewed.   Constitutional:       General: He is not in acute distress.     Appearance: Normal appearance.   HENT:      Head: Normocephalic and atraumatic.      Nose: No congestion or rhinorrhea.   Eyes:      Extraocular Movements: Extraocular movements intact.      Pupils: Pupils are equal, round, and reactive to light.   Neck:      Musculoskeletal: Normal range of motion and neck supple.   Cardiovascular:      Rate and Rhythm: Normal rate and regular rhythm.      Pulses: Normal pulses.   Pulmonary:      Effort: Pulmonary effort is normal. No respiratory distress.      Breath sounds: Normal breath sounds.   Abdominal:      General: Bowel sounds are normal. There is no distension.      Palpations: Abdomen is soft.      Tenderness: There is no abdominal tenderness.   Musculoskeletal:         General: No swelling or tenderness.   Skin:     General: Skin is warm.      Findings: No erythema.   Neurological:      General: No focal deficit present.      Mental Status: He is alert.         Fluids    Intake/Output Summary (Last 24 hours) at 7/11/2020 0858  Last data filed at 7/11/2020 0300  Gross per 24 hour   Intake 240 ml   Output 1725 ml   Net -1485 ml       Laboratory  Recent Labs     07/09/20  0808 07/10/20  0227 07/11/20  0446   WBC 7.0 5.7 11.2*   RBC 2.41* 2.16* 2.75*   HEMOGLOBIN 7.5* 6.7* 8.5*   HEMATOCRIT 23.2* 21.3* 25.9*   MCV 96.3 98.6* 94.2   MCH 31.1 31.0 30.9   MCHC 32.3* 31.5* 32.8*   RDW 55.9* 58.5* 59.7*   PLATELETCT 151* 136* 186   MPV 11.3 11.5 11.1     Recent Labs     07/09/20  0113 07/11/20  0446   SODIUM 137 140   POTASSIUM 4.9 5.1   CHLORIDE 113* 114*   CO2 18* 16*   GLUCOSE 181*  140*   BUN 57* 45*   CREATININE 1.74* 1.62*   CALCIUM 7.9* 7.9*                   Imaging  US-RENAL   Final Result      Unremarkable renal ultrasound.      US-EXTREMITY ARTERY LOWER UNILAT LEFT   Final Result      IR-EXTREMITY ANGIOGRAM-UNILATERAL LEFT    (Results Pending)   IR-ANGIO THROUGH EXISTING CATHETER    (Results Pending)        Assessment/Plan  * Critical lower limb ischemia- (present on admission)  Assessment & Plan  -main diagnosis, likely acute occlusion of the L femoral bypass  Status post angiogram and angioplasty on 7/6/2020.  Continue aspirin, clopidogrel, rivaroxaban.  Vascular surgery is following, Dr. Culp and recommended to follow-up as an outpatient    Acute renal failure superimposed on stage 3 chronic kidney disease (HCC)- (present on admission)  Assessment & Plan  -2/2 dehydration?  Improved  -baseline CKD stage III around 1.5-1.8      History of tobacco abuse- (present on admission)  Assessment & Plan  -counseled for more than 4 minutes on tobacco cessation [id number]   -I have ordered nicotine replacement therapy  Counseled extensively on smoking and peripheral vascular disease    S/P AKA (above knee amputation), right (HCC)- (present on admission)  Assessment & Plan  -appears well healed    Essential hypertension- (present on admission)  Assessment & Plan  -somewhat elevated given concurrent pain  Kidney function improved to baseline.  Hold outpatient HCTZ.  Continue metoprolol.  Started on losartan.    Dyslipidemia- (present on admission)  Assessment & Plan  Start atorvastatin    PAD (peripheral artery disease) (HCC)- (present on admission)  Assessment & Plan  -severe, US pending  -appears to have re-occlusion of femoral bypass on the LLE  -critical limb ischemia, vascular surgery consulted, Dr. uClp  -IV fentanyl and Oral oxy IR PRN pain  -monitor blood pressure  Status post angiogram and angioplasty with stent placement 7/6.  Continue aspirin, clopidogrel, rivaroxaban    Normocytic  anemia  Assessment & Plan  The patient received 1 unit of blood transfusion yesterday  Globin is 8.5 today  FOBT is pending  If positive then will need to consult GI      Hyperglycemia  Assessment & Plan  Monitor blood sugars as above.    Hypoglycemia  Assessment & Plan    Results from last 7 days   Lab Units 07/07/20  1116 07/07/20  0540 07/07/20  0012 07/06/20  1912 07/06/20  1512 07/05/20  2211   ACCU CHECK GLUCOSE 788 mg/dL 96 86 77 89 85 61*     I have ordered insulin sliding scale with D50 and glucagon for hypoglycemia per protocol.  Encourage p.o. intake  Diabetic education      Hyperkalemia- (present on admission)  Assessment & Plan  -mild, 2/2 Acute on CKD Stage III  -Resolved    Hyponatremia- (present on admission)  Assessment & Plan  -2/2 hypovolemia presumed  Resolved.  Hold outpatient HCTZ  -start IVF's with NS and correct slowly, daily NA+         VTE prophylaxis: xarelto

## 2020-07-11 NOTE — THERAPY
Physical Therapy   Daily Treatment     Patient Name: Antonio Walker  Age:  70 y.o., Sex:  male  Medical Record #: [id number]  Today's Date: 7/11/2020     Precautions: Fall Risk, Weight Bearing As Tolerated Left Lower Extremity    Assessment    Pt was pleasant, required max encouragement to participate in therapy session. He continues to report he is nauseous and doesn't feel well. He also reports he hasn't been drinking or eating. Educated on importance of mobility and nutrition for recovery is. He reach EOB with spv. He tolerated sitting up for approx 8 minutes but deferred practicing transfers at this time. Will continue to require acute therapy to address impairments.     Plan    Continue current treatment plan.       07/11/20 1453   Balance   Sitting Balance (Static) Fair +   Sitting Balance (Dynamic) Fair   Gait Analysis   Gait Level Of Assist Unable to Participate   Comments wc bound at baseline    Bed Mobility    Supine to Sit Supervised   Sit to Supine Supervised   Scooting Supervised   Rolling Supervised   Functional Mobility   Sit to Stand Unable to Participate   Bed, Chair, Wheelchair Transfer Refused   Comments stand pivot transfers only at baseline

## 2020-07-11 NOTE — PROGRESS NOTES
"Paged Dr. Noble and notified him that pt is refusing PO medications at this time. IV Zofran given and ginger ale provided. Asked pt if he would be willing to take medications if nausea resolves later today, pt states \"I don't know,\" will continue to monitor and encourage PO medications. No new orders from Dr. Noble at this time.   "

## 2020-07-11 NOTE — ASSESSMENT & PLAN NOTE
The patient received 1 unit of blood transfusion yesterday  FOBT is pending  If positive then will need to consult GI

## 2020-07-11 NOTE — PROGRESS NOTES
Received report from NOC RN and assumed care of pt. Pt is A&Ox4, resting in bed at this time. Pt states he is experiencing nausea, medicated per MAR. Pt states he is unable to tolerate PO medication at this time due to nausea. Pt reports 4/10 pain, but denies any need for medication. No other needs at this time. Bed locked and in lowest position with built in bed alarm on, call light within reach, hourly rounding in place.

## 2020-07-12 PROBLEM — I21.4 NON-STEMI (NON-ST ELEVATED MYOCARDIAL INFARCTION) (HCC): Status: ACTIVE | Noted: 2020-01-01

## 2020-07-12 NOTE — PROGRESS NOTES
Received phone call from monitor room that pt's heart rate sustaining in 150's. Upon checking on pt, denies chest pain. Other VSS. Dr. River paged and notified. Received orders for stat ekg.

## 2020-07-12 NOTE — PROGRESS NOTES
[facility]    Called to see this patient twice on this shift tonight. Around 9:30 pm, he became hypoxic and is requiring 4 L oxymask. I evaluated him and he was not having any complains.     Around 12 am, patient started c/o Chest Pain STAT EKG was done that showed Sinus tachycardia at 109 bpm with 1 mm ST elevation on V2 and V3 with T wave inversions on V4-V6. All these abnormalities are new as compared to his prior EKG on 12/26/19.    By the time I came to the room again to see him, CP has resolved.     Repeat EKG is don at 12:30: is not significantly changed.    Discussed with Cardiologist on call Dr. Perkins, who after reviewing EKG determined no STEMI.     Added serial troponin and will transfer patient to Telemetry.    Rapid team present by the bedside      4:57 am: I was notified about elevated troponin: 1498 - 1580  -Patient remains Chest Pain free and on 4 L Oxy mask. Now starting to c/o SOB  -Gave full dose of aspirin and started him on Heparin gtt.  -Added CT Angiogram of chest  -I update Dr. Perkins and requested a formal Cardiologist Consult in am. He is also recommending to add echocardiogram that I did.   -Continue close monitoring

## 2020-07-12 NOTE — ASSESSMENT & PLAN NOTE
Per cardiology the patient is not a candidate for intervention and recommended medical management  On heparin infusion, need cardiology recommendation regarding continuation of anticoagulation  Echocardiogram pending

## 2020-07-12 NOTE — PROGRESS NOTES
2 RN skin check complete with LUIS FERNANDO Ardon.   Devices in place Oxygen cannula.  Skin assessed under devices CDI.  Confirmed pressure ulcers found on N/A.  The following interventions in place : Q2 hourly turning in place. Pt on waffle mattress. L heel elevated. Silicone nasal cannula with foam padding in use. Mosturizers in use. Mepilex to sacrum.       Skin is intact, generalized bruising and fragile on upper extremities and trunk  L heel is dry, soft, flaky.   R AKA; CDI   Sacrum is red, intact, and blanching. Appears to have old scarring, perhaps old bed sore.

## 2020-07-12 NOTE — PROGRESS NOTES
Trop resulted at 1580. Dr. River paged and notified. Pt on heparin gtt. Denies chest pain. VSS. No new orders at this time.

## 2020-07-12 NOTE — PROGRESS NOTES
Pt brought onto unit by this RN. Pt denies chest pain. VSS. EKG tech here at bedside to do repeat EKG. Bed locked and in lowest position. Call light and personal belongings within reach.

## 2020-07-12 NOTE — PROGRESS NOTES
Pt c/o chest pain. Notified hospitalist and RICU rounding RN. Orders received. EKG done,  Troponins drawn.     Hospitalist and CIC RN at bedside.

## 2020-07-12 NOTE — PROGRESS NOTES
"Pt put on call light and yelled, \"help.\" Upon checking on pt, this RN noticed pt having increased work of breathing along with increased respiratory rate. Pt is on 4L oxymask with O2 sat 95%.  Pt denies chest pain. Received orders for CT of chest.   " 23

## 2020-07-12 NOTE — CONSULTS
Cardiology Initial Consult Note    DOS: 7/12/2020    Referring physician: Dr River    Chief complaint/Reason for consult: Chest pain, MI    HPI: 70-year-old male with diabetes, hyperlipidemia, hypertension, severe peripheral arterial disease, above the knee right amputation, CKD, presented initially with 3 days of worsening leg pain.  He had a history of left femoral artery bypass occlusion January 2020.  Vascular surgery was consulted on his arrival.  He was taken to IR on July 5.    Overnight night, he is having intermittent chest pain at rest. He notes substernal chest pain. Says he never has had chest pain like that before.  EKG showed changes including T wave inversions throughout the precordial leads.  He had less than 1mm ST elevations precordial leads that did not meet criteria for Cath Lab activation.  He was also complaining of nausea overnight.  This morning, he notes his chest pain is minimal, 1/10.  In the past, he denies any episode of heart attack or chest pain at rest.  Denies syncope or palpitations.    ROS (+ highlighted in bold):  Constitutional: Fevers/chills/fatigue/weightloss  HEENT: Blurry vision/eye pain/sore throat/hearing loss  Respiratory: Shortness of breath/cough  Cardiovascular: Chest pain/palpitations/edema/orthopnea/syncope  GI: Nausea/vomitting/diarrhea  MSK: Arthralgias/myagias/muscle weakness  Skin: Rash/sores  Neurological: Numbness/tremors/vertigo  Endocrine: Excessive thirst/polyuria/cold intolerance/heat intolerance  Psych: Depression/anxiety    History reviewed. No pertinent past medical history.   Hypertension, severe PAD, CKD    Past Surgical History:   Procedure Laterality Date   • ANGIOGRAM  1/1/2020    Procedure: ANGIOGRAM;  Surgeon: Gage Valles M.D.;  Location: [facility];  Service: General   • FEMORAL TIBIAL BYPASS Left 1/1/2020    Procedure: CREATION, BYPASS, ARTERIAL, FEMORAL TO TIBIAL;  Surgeon: Gage Valles M.D.;  Location: [facility];  " Service: General   • FEMORAL ENDARTERECTOMY Left 12/27/2019    Procedure: ENDARTERECTOMY, FEMORAL;  Surgeon: Gage Valles M.D.;  Location: SURGERY [facility];  Service: General   • ANGIOGRAM Left 12/27/2019    Procedure: ANGIOGRAM;  Surgeon: Gage Valles M.D.;  Location: SURGERY [facility];  Service: General       Social History     Socioeconomic History   • Marital status:      Spouse name: Not on file   • Number of children: Not on file   • Years of education: Not on file   • Highest education level: Not on file   Occupational History   • Not on file   Social Needs   • Financial resource strain: Not on file   • Food insecurity     Worry: Not on file     Inability: Not on file   • Transportation needs     Medical: Not on file     Non-medical: Not on file   Tobacco Use   • Smoking status: Former Smoker     Packs/day: 0.00   • Smokeless tobacco: Never Used   Substance and Sexual Activity   • Alcohol use: Never     Frequency: Never   • Drug use: Never   • Sexual activity: Not on file   Lifestyle   • Physical activity     Days per week: Not on file     Minutes per session: Not on file   • Stress: Not on file   Relationships   • Social connections     Talks on phone: Not on file     Gets together: Not on file     Attends [employer] service: Not on file     Active member of club or organization: Not on file     Attends meetings of clubs or organizations: Not on file     Relationship status: Not on file   • Intimate partner violence     Fear of current or ex partner: Not on file     Emotionally abused: Not on file     Physically abused: Not on file     Forced sexual activity: Not on file   Other Topics Concern   • Not on file   Social History Narrative   • Not on file       History reviewed. No pertinent family history.   No family history of sudden cardiac death, father with \"heart problems\"    No Known Allergies    Current Facility-Administered Medications   Medication Dose Route Frequency Provider " Last Rate Last Dose   • nitroglycerin (NITROSTAT) tablet 0.4 mg  0.4 mg Sublingual Q5 MIN PRN Gayle Gomez M.D.       • heparin injection 2,600 Units  2,600 Units Intravenous PRN Gayle Gomez M.D.        And   • heparin infusion 25,000 units in 500 mL 0.45% NACL   Intravenous Continuous Gayle Gomez M.D. 21 mL/hr at 07/12/20 0258 1,050 Units/hr at 07/12/20 0258   • dronabinol (MARINOL) capsule 2.5 mg  2.5 mg Oral BEFORE LUNCH AND DINNER Du Noble M.D.   2.5 mg at 07/11/20 1751   • omeprazole (PRILOSEC) capsule 20 mg  20 mg Oral DAILY Kathy Kong, A.P.R.N.   20 mg at 07/12/20 0605   • prochlorperazine (COMPAZINE) injection 10 mg  10 mg Intravenous Q6HRS PRN Kathy Kong, A.P.R.N.   10 mg at 07/10/20 0928   • clopidogrel (PLAVIX) tablet 75 mg  75 mg Oral DAILY Eligio Culp M.D.   75 mg at 07/12/20 0604   • metoprolol SR (TOPROL XL) tablet 25 mg  25 mg Oral DAILY Kareem Danielson DArielOAriel   25 mg at 07/12/20 0604   • losartan (COZAAR) tablet 25 mg  25 mg Oral Q DAY Kareem Danielson D.O.   25 mg at 07/12/20 0604   • aspirin EC (ECOTRIN) tablet 81 mg  81 mg Oral DAILY Eligio Culp M.D.   81 mg at 07/11/20 0513   • atorvastatin (LIPITOR) tablet 40 mg  40 mg Oral Q EVENING Kareem Danielson D.OAriel   40 mg at 07/11/20 1744   • NS infusion   Intravenous Continuous Barrington Esteban M.D. 75 mL/hr at 07/11/20 0241     • insulin regular (HUMULIN R) injection 2-9 Units  2-9 Units Subcutaneous Q6HRS Barrington Esteban M.D.   2 Units at 07/12/20 0610    And   • glucose 4 g chewable tablet 16 g  16 g Oral Q15 MIN PRVARGHESE Esteban M.D.        And   • dextrose 50% (D50W) injection 50 mL  50 mL Intravenous Q15 MIN PRVARGHESE Esteban M.D.       • oxyCODONE immediate-release (ROXICODONE) tablet 5 mg  5 mg Oral Q4HRS PRVARGHESE Ordoñez M.D.   5 mg at 07/09/20 2004    Or   • oxyCODONE immediate release (ROXICODONE) tablet 10 mg  10 mg Oral Q4HRS ABBI Ordoñez M.D.   10  mg at 07/08/20 0335   • Pharmacy Consult Request ...Pain Management Review 1 Each  1 Each Other PHARMACY TO DOSE Eligio Culp M.D.       • gabapentin (NEURONTIN) capsule 300 mg  300 mg Oral TID Zeus Yepez M.D.   300 mg at 07/12/20 0604   • tamsulosin (FLOMAX) capsule 0.4 mg  0.4 mg Oral AFTER BREAKFAST Zeus Yepez M.D.   0.4 mg at 07/10/20 0927   • senna-docusate (PERICOLACE or SENOKOT S) 8.6-50 MG per tablet 2 Tab  2 Tab Oral BID Zeus Yepez M.D.   2 Tab at 07/11/20 1744    And   • polyethylene glycol/lytes (MIRALAX) PACKET 1 Packet  1 Packet Oral QDAY ABBI Yepez M.D.   1 Packet at 07/07/20 1127    And   • magnesium hydroxide (MILK OF MAGNESIA) suspension 30 mL  30 mL Oral QDAY ABBI Yepez M.D.   30 mL at 07/10/20 0927    And   • bisacodyl (DULCOLAX) suppository 10 mg  10 mg Rectal QDAY ABBI Yepez M.D.       • ondansetron (ZOFRAN) syringe/vial injection 4 mg  4 mg Intravenous Q4HRS ABBI Yepez M.D.   4 mg at 07/12/20 0316   • ondansetron (ZOFRAN ODT) dispertab 4 mg  4 mg Oral Q4HRS ABBI Yepez M.D.       • acetaminophen (TYLENOL) tablet 650 mg  650 mg Oral Q6HRS PRVARGHESE Yepez M.D.       • fentaNYL (SUBLIMAZE) injection 25 mcg  25 mcg Intravenous Q2HRS PRVARGHESE Yepez M.D.   25 mcg at 07/05/20 2115       Physical Exam:  Vitals:    07/12/20 0034 07/12/20 0050 07/12/20 0130 07/12/20 0352   BP:   148/82 145/99   Pulse: (!) 110 (!) 116 (!) 112 (!) 125   Resp:  (!) 26 (!) 22 20   Temp:   36.7 °C (98.1 °F) 36.6 °C (97.9 °F)   TempSrc:   Temporal Temporal   SpO2: 98% 95% 94% 94%   Weight:       Height:         General appearance: NAD, conversant   Eyes: anicteric sclerae, moist conjunctivae; no lid-lag; PERRLA  HENT: Atraumatic; oropharynx clear with moist mucous membranes and no mucosal ulcerations; normal hard and soft palate  Neck: Trachea midline; FROM, supple, no thyromegaly or lymphadenopathy  Lungs: CTA, with normal  respiratory effort and no intercostal retractions  CV: RRR, no MRGs, no JVD   Abdomen: Soft, non-tender; no masses or HSM  Extremities: No peripheral edema or extremity lymphadenopathy  Skin: Normal temperature, turgor and texture; no rash, ulcers or subcutaneous nodules  Psych: Appropriate affect, alert and oriented to person, place and time    Data:  Lipids:   Lab Results   Component Value Date/Time    CHOLSTRLTOT 87 (L) 12/27/2019 02:54 AM    TRIGLYCERIDE 98 12/27/2019 02:54 AM    HDL 26 (A) 12/27/2019 02:54 AM    LDL 41 12/27/2019 02:54 AM        BMP:  Lab Results   Component Value Date/Time    SODIUM 137 07/11/2020 2300    POTASSIUM 4.6 07/11/2020 2300    CHLORIDE 113 (H) 07/11/2020 2300    CO2 15 (L) 07/11/2020 2300    GLUCOSE 149 (H) 07/11/2020 2300    BUN 43 (H) 07/11/2020 2300    CREATININE 1.52 (H) 07/11/2020 2300    CALCIUM 7.9 (L) 07/11/2020 2300    ANION 9.0 07/11/2020 2300        TSH:   Lab Results   Component Value Date/Time    TSHULTRASEN 1.400 07/10/2020 0844        THYROXINE (T4):   No results found for: CORONAIR     CBC:   Lab Results   Component Value Date/Time    WBC 11.4 (H) 07/11/2020 11:00 PM    RBC 2.76 (L) 07/11/2020 11:00 PM    HEMOGLOBIN 8.5 (L) 07/11/2020 11:00 PM    HEMATOCRIT 26.2 (L) 07/11/2020 11:00 PM    MCV 94.9 07/11/2020 11:00 PM    MCH 30.8 07/11/2020 11:00 PM    MCHC 32.4 (L) 07/11/2020 11:00 PM    RDW 60.0 (H) 07/11/2020 11:00 PM    PLATELETCT 214 07/11/2020 11:00 PM    MPV 10.9 07/11/2020 11:00 PM    NEUTSPOLYS 75.90 (H) 07/11/2020 11:00 PM    LYMPHOCYTES 10.90 (L) 07/11/2020 11:00 PM    MONOCYTES 11.60 07/11/2020 11:00 PM    EOSINOPHILS 0.40 07/11/2020 11:00 PM    BASOPHILS 0.40 07/11/2020 11:00 PM    IMMGRAN 0.80 07/11/2020 11:00 PM    NRBC 0.70 07/11/2020 11:00 PM    NEUTS 8.68 (H) 07/11/2020 11:00 PM    LYMPHS 1.24 07/11/2020 11:00 PM    MONOS 1.32 (H) 07/11/2020 11:00 PM    EOS 0.04 07/11/2020 11:00 PM    BASO 0.04 07/11/2020 11:00 PM    IMMGRANAB 0.09 07/11/2020 11:00 PM     NRBCAB 0.08 07/11/2020 11:00 PM        CBC w/o DIFF  Lab Results   Component Value Date/Time    WBC 11.4 (H) 07/11/2020 11:00 PM    RBC 2.76 (L) 07/11/2020 11:00 PM    HEMOGLOBIN 8.5 (L) 07/11/2020 11:00 PM    MCV 94.9 07/11/2020 11:00 PM    MCH 30.8 07/11/2020 11:00 PM    MCHC 32.4 (L) 07/11/2020 11:00 PM    RDW 60.0 (H) 07/11/2020 11:00 PM    MPV 10.9 07/11/2020 11:00 PM     Troponin: >1400 on multiple repeat testing, largely flat    EKG interpreted by me: Sinus rhythm/tachycardia, anterior TW inversions, <1mm ST elevated in v3    Impression/Plan:  1. Weakness  REFERRAL TO HOME HEALTH     1. HTN  2. Presumed CAD  3. NSTEMI, unclear ACS vs demand    -Currently troponin trend does not resemble acute infarct due to ACS, but ECG changes suggest ischemia, concerning for high-grade lesion(s) with demand ischemia during sinus tachycardia  -Suggest echocardiogram, this has been ordered  -Keep NPO, reasonable to proceed to [facility] today  -Continue heparin gtt for now, he is already on DAPT    Thank you for this consult, cardiology will continue to follow.    Jona Perkins MD  Cardiac Electrophysiology

## 2020-07-12 NOTE — PROGRESS NOTES
2 RN skin check complete with LUIS FERNANDO Ponce  Devices in place: mepilex, PIV, condom cath  Skin assessed under devices: yes  Confirmed pressure ulcers found: n/a  New potential pressure ulcers noted: n/a  The following interventions in place: Q2 turns, incontinence checks, condom cath for urinary drainage, mepilex to sacrum     Notes: bilateral elbows pink and blanching, BUE bruising, sacrum red and blanching with mepilex in place, right BKA, left heel pink and blanching

## 2020-07-12 NOTE — CARE PLAN
Problem: Communication  Goal: The ability to communicate needs accurately and effectively will improve  Outcome: PROGRESSING AS EXPECTED     Problem: Safety  Goal: Will remain free from injury  Outcome: PROGRESSING AS EXPECTED     Problem: Pain Management  Goal: Pain level will decrease to patient's comfort goal  Outcome: PROGRESSING AS EXPECTED     Problem: Respiratory:  Goal: Respiratory status will improve  Outcome: PROGRESSING AS EXPECTED  Note: Patient is having periods of shortness of breath

## 2020-07-12 NOTE — PROGRESS NOTES
Oxygen 83%, placed on 2L NC. . Respiration 29. Hospitalist notified. New orders received. Called lab to draw lactic acid.

## 2020-07-12 NOTE — PROGRESS NOTES
Non-STEMI, likely demand ischemia, isdemia with hemoglobin level of  M/uL on 07/09/20, vasculopath. He is not candidate for cardiac catheterization. We will continue with medical therapy only.    Abilio Gaitan MD,FACC,[facility]

## 2020-07-12 NOTE — PROGRESS NOTES
2 RN transport here to  pt. Transferring to T823-1. Bedside report given. Pt placed on monitor. All belongings including wheelchair with pt. Pt's chart and bin medications given to RN. RN made aware of EKG to be done when arrival to T823-1.

## 2020-07-12 NOTE — PROGRESS NOTES
Hospital Medicine Daily Progress Note    Date of Service  7/12/2020      Chief Complaint  70 y.o. male admitted 7/4/2020 with left lower extremity pain    Hospital Course    Mr. Antonio Walker is a 70 y.o. male with history of severe peripheral arterial disease with prior femoral artery bypass occlusions who presented on 7/4/2020 with acute left leg pain x3 days.  Pain starts in the mid thigh and goes all the way down to the distal feet and toes.  Also reports associated weakness and numbness.  Son confirms thrombosis vascular surgery Dr. Culp was consulted for critical limb ischemia.  Patient was started on a heparin drip and later TPA.  Status post lower extremity angiogram with stent placement in the left popliteal artery.  Patient was started on clopidogrel, aspirin, rivaroxaban.          Interval Problem Update  7/10 No acute issue overnight.  Vascular surgery stated that he can be discharged and follow-up with him as an outpatient.  Home health is ordered.    7/11 I updated him about his hemoglobin this morning.  Patient is yet to have bowel movement to have occult blood test.  Need to rule that out before he get discharge to SNF.    7/12: The patient was transferred to telemetry floor due to chest pain cardiology was consulted overnight and following.  His troponin is over 1600 and the result will be updated to cardiology.    Review of Systems  Review of Systems   Constitutional: Positive for malaise/fatigue. Negative for chills and fever.   HENT: Negative for congestion and nosebleeds.    Eyes: Negative for pain and discharge.   Respiratory: Negative for cough, sputum production, shortness of breath and stridor.    Cardiovascular: Positive for chest pain. Negative for palpitations and orthopnea.   Gastrointestinal: Negative for abdominal pain, heartburn, nausea and vomiting.   Genitourinary: Negative for dysuria, frequency and urgency.   Musculoskeletal: Positive for myalgias. Negative for back  pain and neck pain.   Skin: Negative for itching and rash.   Neurological: Negative for focal weakness, seizures and headaches.   Psychiatric/Behavioral: The patient is not nervous/anxious and does not have insomnia.         Physical Exam  Temp:  [36.4 °C (97.5 °F)-37.6 °C (99.6 °F)] 36.6 °C (97.9 °F)  Pulse:  [101-128] 125  Resp:  [16-29] 20  BP: (129-148)/(67-99) 145/99  SpO2:  [83 %-98 %] 94 %    Physical Exam  Vitals signs reviewed.   Constitutional:       Appearance: Normal appearance.   HENT:      Head: Normocephalic and atraumatic.      Nose: No congestion or rhinorrhea.   Eyes:      Extraocular Movements: Extraocular movements intact.      Pupils: Pupils are equal, round, and reactive to light.   Neck:      Musculoskeletal: Normal range of motion and neck supple.   Cardiovascular:      Rate and Rhythm: Normal rate and regular rhythm.      Pulses: Normal pulses.      Heart sounds: Normal heart sounds.   Pulmonary:      Effort: Pulmonary effort is normal. No respiratory distress.      Breath sounds: Normal breath sounds.   Abdominal:      General: Bowel sounds are normal. There is no distension.      Palpations: Abdomen is soft.   Musculoskeletal:         General: No swelling or tenderness.   Skin:     General: Skin is warm.      Findings: No erythema.   Neurological:      General: No focal deficit present.      Mental Status: He is alert.         Fluids    Intake/Output Summary (Last 24 hours) at 7/12/2020 0823  Last data filed at 7/11/2020 2117  Gross per 24 hour   Intake 240 ml   Output --   Net 240 ml       Laboratory  Recent Labs     07/10/20  0227 07/11/20  0446 07/11/20  2300   WBC 5.7 11.2* 11.4*   RBC 2.16* 2.75* 2.76*   HEMOGLOBIN 6.7* 8.5* 8.5*   HEMATOCRIT 21.3* 25.9* 26.2*   MCV 98.6* 94.2 94.9   MCH 31.0 30.9 30.8   MCHC 31.5* 32.8* 32.4*   RDW 58.5* 59.7* 60.0*   PLATELETCT 136* 186 214   MPV 11.5 11.1 10.9     Recent Labs     07/11/20  0446 07/11/20  2300   SODIUM 140 137   POTASSIUM 5.1 4.6    CHLORIDE 114* 113*   CO2 16* 15*   GLUCOSE 140* 149*   BUN 45* 43*   CREATININE 1.62* 1.52*   CALCIUM 7.9* 7.9*     Recent Labs     07/12/20  0255   APTT 34.1   INR 1.19*               Imaging  CT-CTA CHEST PULMONARY ARTERY W/ RECONS   Final Result      1.  No evidence of pulmonary emboli.   2.  Scattered areas of discoid atelectasis and interstitial opacities in both lungs, along with bilateral pleural effusions, right greater than left.   3.  Ascites.            US-RENAL   Final Result      Unremarkable renal ultrasound.      US-EXTREMITY ARTERY LOWER UNILAT LEFT   Final Result      IR-EXTREMITY ANGIOGRAM-UNILATERAL LEFT    (Results Pending)   IR-ANGIO THROUGH EXISTING CATHETER    (Results Pending)   EC-ECHOCARDIOGRAM COMPLETE W/O CONT    (Results Pending)        Assessment/Plan  * Critical lower limb ischemia- (present on admission)  Assessment & Plan  -main diagnosis, likely acute occlusion of the L femoral bypass  Status post angiogram and angioplasty on 7/6/2020.  Continue aspirin, clopidogrel, rivaroxaban.  Vascular surgery is following, Dr. Culp and recommended to follow-up as an outpatient    Acute renal failure superimposed on stage 3 chronic kidney disease (HCC)- (present on admission)  Assessment & Plan  -2/2 dehydration?  Improved  -baseline CKD stage III around 1.5-1.8      History of tobacco abuse- (present on admission)  Assessment & Plan  -counseled for more than 4 minutes on tobacco cessation 71513   -I have ordered nicotine replacement therapy  Counseled extensively on smoking and peripheral vascular disease    S/P AKA (above knee amputation), right (HCC)- (present on admission)  Assessment & Plan  -appears well healed    Essential hypertension- (present on admission)  Assessment & Plan  -somewhat elevated given concurrent pain  Kidney function improved to baseline.  Hold outpatient HCTZ.  Continue metoprolol.  Started on losartan.    Dyslipidemia- (present on admission)  Assessment &  Plan  Start atorvastatin    PAD (peripheral artery disease) ([facility])- (present on admission)  Assessment & Plan  -severe, US pending  -appears to have re-occlusion of femoral bypass on the LLE  -critical limb ischemia, vascular surgery consulted, Dr. Culp  -IV fentanyl and Oral oxy IR PRN pain  -monitor blood pressure  Status post angiogram and angioplasty with stent placement 7/6.  Continue aspirin, clopidogrel, rivaroxaban    Non-STEMI (non-ST elevated myocardial infarction) ([facility])  Assessment & Plan  Per cardiology the patient is not a candidate for intervention and recommended medical management  Echocardiogram pending    Normocytic anemia  Assessment & Plan  The patient received 1 unit of blood transfusion yesterday  Globin is 8.5 today  FOBT is pending  If positive then will need to consult GI      Hyperglycemia  Assessment & Plan  Monitor blood sugars as above.    Hypoglycemia  Assessment & Plan    Results from last 7 days   Lab Units 07/07/20  1116 07/07/20  0540 07/07/20  0012 07/06/20  1912 07/06/20  1512 07/05/20  2211   ACCU CHECK GLUCOSE 788 mg/dL 96 86 77 89 85 61*     I have ordered insulin sliding scale with D50 and glucagon for hypoglycemia per protocol.  Encourage p.o. intake  Diabetic education      Hyperkalemia- (present on admission)  Assessment & Plan  -mild, 2/2 Acute on CKD Stage III  -Resolved    Hyponatremia- (present on admission)  Assessment & Plan  -2/2 hypovolemia presumed  Resolved.  Hold outpatient HCTZ  -start IVF's with NS and correct slowly, daily NA+       VTE prophylaxis: xarelto

## 2020-07-12 NOTE — PROGRESS NOTES
Troponin results 1498, notified hospitalist. Orders received. EKG to be done when pt arrives to [facility].     Called EKG. Pt on their way to [facility] - [id number].

## 2020-07-12 NOTE — PROGRESS NOTES
Called RT, O2 89%. Oxygen increased to 4L.     Called RICU rounding RN as well. Pt previously on rounding list.     RT, RICU rounding RN and Hopsitalist at bedside assessing pt.     Orders received. Pt on 6L oxymask. Will continue to monitor HR and O2. Incentive spirometer given to pt. Cough and deep breathing encouraged.

## 2020-07-13 NOTE — PROGRESS NOTES
Hospital Medicine Daily Progress Note    Date of Service  7/13/2020      Chief Complaint  70 y.o. male admitted 7/4/2020 with left lower extremity pain    Hospital Course    Mr. Antonio Walker is a 70 y.o. male with history of severe peripheral arterial disease with prior femoral artery bypass occlusions who presented on 7/4/2020 with acute left leg pain x3 days.  Pain starts in the mid thigh and goes all the way down to the distal feet and toes.  Also reports associated weakness and numbness.  Son confirms thrombosis vascular surgery Dr. Culp was consulted for critical limb ischemia.  Patient was started on a heparin drip and later TPA.  Status post lower extremity angiogram with stent placement in the left popliteal artery.  Patient was started on clopidogrel, aspirin, rivaroxaban.          Interval Problem Update  7/10 No acute issue overnight.  Vascular surgery stated that he can be discharged and follow-up with him as an outpatient.  Home health is ordered.    7/11 I updated him about his hemoglobin this morning.  Patient is yet to have bowel movement to have occult blood test.  Need to rule that out before he get discharge to SNF.    7/12: The patient was transferred to telemetry floor due to chest pain cardiology was consulted overnight and following.  His troponin is over 1600 and the result will be updated to cardiology.    7/13: Per cardiology the patient is not a candidate for any intervention.  Chest x-ray showed mild edema.  I started him on IV Lasix 20 mg twice daily.  I discussed the case with cardiology regarding medical management.  The patient is still constipated and will try to get stool sample for occult blood test.    Review of Systems  Review of Systems   Constitutional: Positive for malaise/fatigue. Negative for chills and fever.   HENT: Negative for congestion and nosebleeds.    Eyes: Negative for pain and discharge.   Respiratory: Positive for shortness of breath. Negative for  sputum production and stridor.    Cardiovascular: Positive for chest pain. Negative for palpitations and orthopnea.   Gastrointestinal: Negative for abdominal pain, nausea and vomiting.   Genitourinary: Negative for frequency and urgency.   Musculoskeletal: Positive for myalgias. Negative for back pain and neck pain.   Skin: Negative for itching and rash.   Neurological: Negative for focal weakness and seizures.   Psychiatric/Behavioral: The patient is not nervous/anxious and does not have insomnia.         Physical Exam  Temp:  [36.2 °C (97.2 °F)-36.7 °C (98.1 °F)] 36.2 °C (97.2 °F)  Pulse:  [101-119] 109  Resp:  [18-32] 20  BP: (105-165)/(60-96) 165/96  SpO2:  [92 %-100 %] 95 %    Physical Exam  Vitals signs reviewed.   HENT:      Head: Normocephalic and atraumatic.      Nose: No congestion or rhinorrhea.   Eyes:      Extraocular Movements: Extraocular movements intact.      Pupils: Pupils are equal, round, and reactive to light.   Neck:      Musculoskeletal: Normal range of motion and neck supple.   Cardiovascular:      Rate and Rhythm: Normal rate.      Pulses: Normal pulses.      Heart sounds: Normal heart sounds.   Pulmonary:      Effort: Pulmonary effort is normal.      Breath sounds: Normal breath sounds.   Abdominal:      General: Bowel sounds are normal. There is no distension.      Palpations: Abdomen is soft.   Musculoskeletal:         General: No swelling or tenderness.   Skin:     General: Skin is warm.      Findings: No erythema.   Neurological:      General: No focal deficit present.      Mental Status: He is alert.         Fluids    Intake/Output Summary (Last 24 hours) at 7/13/2020 0828  Last data filed at 7/12/2020 1800  Gross per 24 hour   Intake --   Output 1400 ml   Net -1400 ml       Laboratory  Recent Labs     07/11/20  0446 07/11/20  2300   WBC 11.2* 11.4*   RBC 2.75* 2.76*   HEMOGLOBIN 8.5* 8.5*   HEMATOCRIT 25.9* 26.2*   MCV 94.2 94.9   MCH 30.9 30.8   MCHC 32.8* 32.4*   RDW 59.7* 60.0*    PLATELETCT 186 214   MPV 11.1 10.9     Recent Labs     07/11/20  0446 07/11/20  2300   SODIUM 140 137   POTASSIUM 5.1 4.6   CHLORIDE 114* 113*   CO2 16* 15*   GLUCOSE 140* 149*   BUN 45* 43*   CREATININE 1.62* 1.52*   CALCIUM 7.9* 7.9*     Recent Labs     07/12/20  0255  07/12/20  1510 07/12/20  2119 07/13/20  0452   APTT 34.1   < > 107.0* 64.9* 78.5*   INR 1.19*  --   --   --   --     < > = values in this interval not displayed.               Imaging  DX-CHEST-PORTABLE (1 VIEW)   Final Result      Bilateral pulmonary opacities consistent with pulmonary edema and/or multifocal pneumonia, along with bilateral pleural effusions.      CT-CTA CHEST PULMONARY ARTERY W/ RECONS   Final Result      1.  No evidence of pulmonary emboli.   2.  Scattered areas of discoid atelectasis and interstitial opacities in both lungs, along with bilateral pleural effusions, right greater than left.   3.  Ascites.            US-RENAL   Final Result      Unremarkable renal ultrasound.      US-EXTREMITY ARTERY LOWER UNILAT LEFT   Final Result      IR-EXTREMITY ANGIOGRAM-UNILATERAL LEFT    (Results Pending)   IR-ANGIO THROUGH EXISTING CATHETER    (Results Pending)   EC-ECHOCARDIOGRAM COMPLETE W/O CONT    (Results Pending)        Assessment/Plan  * Critical lower limb ischemia- (present on admission)  Assessment & Plan  -main diagnosis, likely acute occlusion of the L femoral bypass  Status post angiogram and angioplasty on 7/6/2020.  Continue aspirin, clopidogrel, rivaroxaban.  Vascular surgery is following, Dr. Culp and recommended to follow-up as an outpatient    Non-STEMI (non-ST elevated myocardial infarction) (HCC)  Assessment & Plan  Per cardiology the patient is not a candidate for intervention and recommended medical management  On heparin infusion, need cardiology recommendation regarding continuation of anticoagulation  Echocardiogram pending    Acute renal failure superimposed on stage 3 chronic kidney disease (HCC)- (present  on admission)  Assessment & Plan  -2/2 dehydration?  Improved  -baseline CKD stage III around 1.5-1.8      History of tobacco abuse- (present on admission)  Assessment & Plan  -counseled for more than 4 minutes on tobacco cessation [id number]   -I have ordered nicotine replacement therapy  Counseled extensively on smoking and peripheral vascular disease    S/P AKA (above knee amputation), right (HCC)- (present on admission)  Assessment & Plan  -appears well healed    Essential hypertension- (present on admission)  Assessment & Plan  -somewhat elevated given concurrent pain  Kidney function improved to baseline.  Hold outpatient HCTZ.  Continue metoprolol.  Started on losartan.    Dyslipidemia- (present on admission)  Assessment & Plan  Start atorvastatin    PAD (peripheral artery disease) (HCC)- (present on admission)  Assessment & Plan  -severe, US pending  -appears to have re-occlusion of femoral bypass on the LLE  -critical limb ischemia, vascular surgery consulted, Dr. Culp  -IV fentanyl and Oral oxy IR PRN pain  -monitor blood pressure  Status post angiogram and angioplasty with stent placement 7/6.  Continue aspirin, clopidogrel, rivaroxaban(holding due to heparin infusion)    Normocytic anemia  Assessment & Plan  The patient received 1 unit of blood transfusion yesterday  FOBT is pending  If positive then will need to consult GI      Hyperglycemia  Assessment & Plan  Monitor blood sugars as above.    Hypoglycemia  Assessment & Plan    Results from last 7 days   Lab Units 07/07/20  1116 07/07/20  0540 07/07/20  0012 07/06/20  1912 07/06/20  1512 07/05/20  2211   ACCU CHECK GLUCOSE 788 mg/dL 96 86 77 89 85 61*     I have ordered insulin sliding scale with D50 and glucagon for hypoglycemia per protocol.  Encourage p.o. intake  Diabetic education      Hyperkalemia- (present on admission)  Assessment & Plan  -mild, 2/2 Acute on CKD Stage III  -Resolved    Hyponatremia- (present on admission)  Assessment & Plan  -2/2  hypovolemia presumed  Resolved.  Hold outpatient HCTZ  -start IVF's with NS and correct slowly, daily NA+       VTE prophylaxis: heparin

## 2020-07-13 NOTE — DIETARY
"Nutrition Services: Update   Day 9 of admit.  Antonio Walker is a 70 y.o. male with admitting DX of leg pain, limb ischemia    Pt is currently on Regular diet. Poor PO, minimal nutrition x2 weeks.  Wt 74.9 kg via bed scale - 7/12 (trend up).     Spoke to MD this morning. Strongly recommended TF given pt's severe malnutrition and poor intake. MD agreeable; however, pt does not wish to have tube placed.     Spoke with patient this afternoon, who relayed concerns about having tube placed for enteral nutrition. He states his appetite has improved slightly this afternoon, and relays he would like to do \"everything (he) can\" to prevent having a tube placed. Pt dislikes Magic Cup supplement but is willing to re-try Boost supplement. Pt educated on possible ways to improve supplement taste, including mixing supplement with orange juice or coffee. Pt encouraged on importance of eating in his healing process and regain of strength.    Malnutrition Risk: Severe malnutrition per RD note 7/10.    Recommendations/Plan:  1. DC Magic Cup supplement.  2. Add Boost supplement (vanilla) to B/D meals.  3. Encourage intake of meals and supplements  4. Document intake of all meals and supplements as % taken in ADL's to provide interdisciplinary communication across all shifts.   5. Monitor weight.  6. Nutrition rep will continue to see patient for ongoing meal and snack preferences.    RD following    "

## 2020-07-13 NOTE — CARE PLAN
Problem: Bowel/Gastric:  Goal: Normal bowel function is maintained or improved  Outcome: PROGRESSING AS EXPECTED  Goal: Will not experience complications related to bowel motility  Outcome: PROGRESSING AS EXPECTED     Problem: Knowledge Deficit  Goal: Knowledge of disease process/condition, treatment plan, diagnostic tests, and medications will improve  Outcome: PROGRESSING AS EXPECTED  Goal: Knowledge of the prescribed therapeutic regimen will improve  Outcome: PROGRESSING AS EXPECTED

## 2020-07-13 NOTE — PROGRESS NOTES
Report received by day shift RN. Pt awake alert and oriented x 4 with no c/o pain or SOB. Pt updated to POC and verbalized understanding. Bed locked in low position with fall precautions in place and call light in reach.

## 2020-07-13 NOTE — CARE PLAN
"  Problem: Nutritional:  Goal: Achieve adequate nutritional intake  Description: Patient will consume 50% of meals  Outcome: PROGRESSING SLOWER THAN EXPECTED   Poor PO continues. Dc'd Magic Cup per pt request. Pt agreeable to \"try Boost again\"- added supplement to B/D meals. See RD note.     RD following.   "

## 2020-07-13 NOTE — PROGRESS NOTES
Vascular    Events noted: NSTEMI started on heparin  He reports no chest pain today and the left leg is starting to feel better.    Excellent reperfusion of the left foot via the AT  On ASA 81, Plavix, and heparin gtt (was on xarelto prior to NSTEMI)  PT/OT    Discharge once cleared by medicine, may need SNF  Outpatient follow-up with me for ongoing vascular care    Very much appreciate the hospitalist service's support managing Mr. Walker    Eligio Culp MD  [facility] (General and Vascular Surgery)  Cell: [phone] (text or call is fine, if you don't reach me please try my office)  Office: [phone]  __________________________________________________________________  Patient:Antonio Walker   MRN:[id number]   CSN:[id number]

## 2020-07-13 NOTE — CARE PLAN
Problem: Safety  Goal: Will remain free from falls  Outcome: PROGRESSING AS EXPECTED  Intervention: Implement fall precautions  Flowsheets  Taken 7/13/2020 1511  Bed Alarm: Yes - Alarm On  IV Pole on Same Side of Bed as Bathroom: Yes  Bedrails: Bedrails Closest to Bathroom Down  Chair/Bed Strip Alarm: Yes - Alarm On  Taken 7/13/2020 0815  Environmental Precautions:   Treaded Slipper Socks on Patient   Personal Belongings, Wastebasket, Call Bell etc. in Easy Reach   Transferred to Stronger Side   Report Given to Other Health Care Providers Regarding Fall Risk   Bed in Low Position   Communication Sign for Patients & Families   Mobility Assessed & Appropriate Sign Placed     Problem: Respiratory:  Goal: Respiratory status will improve  Outcome: PROGRESSING AS EXPECTED  Intervention: Educate and encourage coughing and deep breathing  Note: Patient educated on the importance of coughing, deep breathing, and increased ambulation to improve oxygenation.

## 2020-07-13 NOTE — PROGRESS NOTES
Bedside report received. Pt A&Ox4. Heparin drip verified. POC discussed with pt. Pt verbalizes understanding. Call light and belongings within reach. Bed locked and in lowest position. Alarm and fall precautions in place.

## 2020-07-13 NOTE — PROGRESS NOTES
Monitor room notified pt heart rate 155. Pt was SOB, tachypnic and anxious. Physician notified X Ray ordered. Beta blocker given early.

## 2020-07-13 NOTE — DOCUMENTATION QUERY
[facility]                                                                       Query Response Note      PATIENT:               LEONIE CHENG  ACCT #:                  [id number]  MRN:                     [id number]  :                      1950  ADMIT DATE:       2020 7:46 PM  DISCH DATE:          RESPONDING  PROVIDER #:        [id number]           QUERY TEXT:    NSTEMI is documented in the - Hospitalist Progress Notes.  NSTEMI, likely demand ischemia is documented by cardiology.  Can this diagnosis be clarified?    NOTE:  If an appropriate response is not listed below, please respond with a new note.                                                                                                                                        The patient's Clinical Indicators include:  Per  Cardiology Progress Note: Non-STEMI, likely demand ischemia  Cardiology Consult: NSTEMI, unclear ACS vs demand.  current troponin trend does not resemble acute infarct due to ACS, but ECG changes suggest ischemia, concerning for high grade lesion(s) with demand ischemia during sinus tachycardia  Progress Notes: Non-STEMI, not a candidate for intervention   Troponin T: 1498, 1580, 1526, 1662   EKG: consider anterior infarct, probable inferior infarct  Risk Factors: htn, hyperlipidemia, CAD, DM  treatment: ASA, statin, cozaar, metoprolol, supplemental 02  Options provided:   -- NSTEMI   -- Type 2 MI (due to demand ischemia or secondary to ischemic imbalance)   -- Unable to determine      Query created by: Justin Robles on 2020 11:04 AM    RESPONSE TEXT:    NSTEMI          Electronically signed by:  SHUN DENNIS MD 2020 4:56 PM

## 2020-07-13 NOTE — PROGRESS NOTES
Tomeka from Lab called with critical result of occult stool positive for blood at 1231. Critical lab result read back to Tomeka.   Dr. Noble notified of critical lab result, result read back to Dr. Noble. No new orders at this time.

## 2020-07-13 NOTE — RESPIRATORY CARE
Oxygen Rounds      Patient found on    O2 L/m:  ____3_____    Oxygen device:  __oxymask______   Spo2: ____95_____%      Respiratory device skin site inspection completed.

## 2020-07-13 NOTE — PROGRESS NOTES
Reason for consultation: NSTEMI    70 year old male s/p AKA admitted for left limb ischemia due to fem pop bypass occlusion  Underwent tPA and intervention of bypass graft with new stent placed on 7/5  Was on Xarelto +DAPT post procedure  Had sig blood loss post procedure required blood transfusion    Suffered NSTEMI probably day before or yesterday  Less SOB this AM]Denies CP  Still slightly tachycardic  BP normal    Review of systems;    General: No fever, chills  HENT: No sore throat, no neck pain  Eyes: No blurred vision or double vision  Heart: No palpitation, +orthopnea,  no leg swelling  Lung: +cough, no hemoptysis  Abdomen: No abdominal pain, no nausea vomiting diarrhea or blood in stool  : No dysuria, no frequency or hesitancy, no hematuria  Musculoskeletal: No myalgia, no back pain, some joint pain  Hematology: No easy bruising  Skin: No rash or itching  Neurological: No headache, no new focal weakness or numbness  Psychological: Denies depression, anxiety or insomnia  All other review of systems are negative      Temp:  [36.2 °C (97.2 °F)-36.8 °C (98.2 °F)] 36.8 °C (98.2 °F)  Pulse:  [100-119] 109  Resp:  [18-32] 20  BP: (105-165)/(60-96) 118/76  SpO2:  [92 %-98 %] 95 %  GENERAL not in acute distress, not dyspnic at rest  Head atraumatic, normocephalic  Eyes EOMI  ENT neck supple, no JVD, no carotid bruits or thyromegaly  Lung good expansion, distant sound, crackles right lower lung  Heart RRR, normal rate, no murmur,   no gallop or rub  Abd soft, no tenderness, mass or bruits  Ext no edema  Skin no ecchymosis or petechiae  Musculoskeletal no deformity  Neuro grossly intact  Psych normal mood, normal affect    Monitor reviewed by me showed no significant ventricular or atrial dysrhythmias.    Labs: Cr 1.59, K+ 4.7  NT pro BNP >84582    ECHO earlier today  Left ventricular ejection fraction is visually estimated to be 25%.  Global hypokinesis.  Mild mitral regurgitation.  Moderate tricuspid  regurgitation.  Estimated right ventricular systolic pressure  is 97 mmHg.    CT 7/12/20 no PE    Assessment and plans    1. NSTEMI type II precipitated by severe anemia and acute stress  Likely with multivessel CAD, DM  S/p AKA, limited conduits for CABG  CKD with recent angio for PAD and CT  High risk for CARLITA but with severe LV systolic dysfunction  Optimize med Rx. Up-titrate metoprolol (try reducing HR to 70-80)  Consider MPI to r/o high risk anatomy as outpt  Would favor conservative for now until renal fiunction improved  From cardiac standpoint  IV anticoagulation till tomorrow  DAPT for now    2. 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 CKD with aute worsening likely due to contrast  Per primary    4. PAD s/p AKA and fem pop bypass with new stent  Per vascular and primary    Will follow    Please note that this dictation was created using voice recognition software. I have worked with consultants from the vendor as well as technical experts from [employer] [employer] to optimize the interface. I have made every reasonable attempt to correct obvious errors, but I expect that there are errors of grammar and possibly content I did not discover before finalizing the note

## 2020-07-13 NOTE — PROGRESS NOTES
Patient states he is missing his phone cord.  I have called [place] [id number] Charge nurse and asked her to look for the phone cord.  Patient had been in S-[id number].

## 2020-07-14 PROBLEM — I25.5 ISCHEMIC CARDIOMYOPATHY: Status: ACTIVE | Noted: 2020-01-01

## 2020-07-14 PROBLEM — K92.2 GI BLEED: Status: ACTIVE | Noted: 2020-01-01

## 2020-07-14 NOTE — PROGRESS NOTES
Patient reported feeling SOB and dyspnea. Patient sat up in bed, no c/o pain. CNA reported that past two nights, same thing has happened. /78, , O2 99% @ 3L/min, RR 26. Patient stated he felt very anxious. No PRN in MAR. Hospitalist paged to be notified. Will continue to monitor.

## 2020-07-14 NOTE — THERAPY
Physical Therapy   Daily Treatment     Patient Name: Antonio Walker  Age:  70 y.o., Sex:  male  Medical Record #: [id number]  Today's Date: 7/14/2020     Precautions: Fall Risk, Weight Bearing As Tolerated Left Lower Extremity    Assessment    Pt seen for follow up PT tx. Pt is progressing well but continues to be limited by weakness and decreased activity tolerance. PT will cont.     Plan    Continue current treatment plan.    Discharge recommendations:  Recommend home health transitional care for continued physical therapy services.        07/14/20 1440   Balance   Sitting Balance (Static) Fair +   Sitting Balance (Dynamic) Fair   Weight Shift Sitting Fair   Weight Shift Standing Absent   Skilled Intervention Verbal Cuing;Compensatory Strategies   Comments with transfers   Gait Analysis   Gait Level Of Assist Unable to Participate   Bed Mobility    Supine to Sit Supervised   Sit to Supine Supervised   Scooting Supervised   Rolling Supervised   Functional Mobility   Sit to Stand Unable to Participate   Bed, Chair, Wheelchair Transfer Minimal Assist   Transfer Method Squat Pivot   Mobility EOB, SPT   Wheelchair Assist Supervised   Short Term Goals    Short Term Goal # 1 in 6 visits patient will demo indep for all bed mobility for safe DC home   Goal Outcome # 1 goal not met   Short Term Goal # 2 in 6 visits patient will perform all transfers indep for safe DC home   Goal Outcome # 2 Goal not met   Short Term Goal # 3 in 6 visits patient will self-propel 600' indep for safe community navigation    Goal Outcome # 3 Goal not met   Anticipated Discharge Equipment   DC Equipment Unable To Determine At This Time

## 2020-07-14 NOTE — CARE PLAN
Problem: Safety  Goal: Will remain free from falls  Outcome: PROGRESSING AS EXPECTED  Intervention: Implement fall precautions  Flowsheets  Taken 7/13/2020 1511  Bed Alarm: Yes - Alarm On  IV Pole on Same Side of Bed as Bathroom: Yes  Bedrails: Bedrails Closest to Bathroom Down  Chair/Bed Strip Alarm: Yes - Alarm On  Taken 7/14/2020 0745  Environmental Precautions:   Treaded Slipper Socks on Patient   Personal Belongings, Wastebasket, Call Bell etc. in Easy Reach   Transferred to Stronger Side   Report Given to Other Health Care Providers Regarding Fall Risk   Bed in Low Position   Communication Sign for Patients & Families   Mobility Assessed & Appropriate Sign Placed     Problem: Respiratory:  Goal: Respiratory status will improve  Outcome: PROGRESSING AS EXPECTED  Intervention: Educate and encourage coughing and deep breathing  Note: Patient educated on the importance of coughing, deep breathing, increasing mobility, and incentive spirometer usage to improve oxygenation. Patient on 2L on an oxy mask. Verbalized understanding.

## 2020-07-14 NOTE — PROGRESS NOTES
Cardiology Follow-up Consult Note    Date of note:    7/14/2020      Consulting Physician: Du Noble M.D.    Patient ID:  Name:   Antonio Walker   YOB: 1950  Age:   70 y.o.  male   MRN:   [id number]    Chief Complaint   Patient presents with   • Leg Pain     left leg       ID:   A 70 y.o. male with PMHx of severe PAD S/P left femoral endarterectomy and a left femoral to above-knee popliteal artery bypass in 01/2020 and right AKA, DM II, HTN, CKD III, tobacco abuse has been admitted for acute left lower limb ischemia and was found to have fem pop bypass occlusion s/p tPA and intervention of bypass graft with new stent placed on 7/5. Started on Xarelto + DAPT post procedure. His stay was complicated by drop in Hb <7 s/p blood transfusion and NSTEMI (dyspnea and chest pain with elevation in troponin   on 7/12 which peaked up to 1662 without significant dynamic ST-T wave changes)     Interim Events:    -No acute events overnight, patient denied any chest pain and dyspnea has been improving  -Tachy with W[place] in 90s-100s, telemetry did not show any atrial-ventriculer arrhythmias  -saturation is above 90% on 2-3 L O2  -BP WNL  -Heparin drip and DAPT stopped today due to drop in Hb and FOBT positive  -GI consulted  -New diagnosis of HFrEF with EF of 25%, likely ischemic CMP due to CAD.   -Good UO with negative fluid balance of 1300 ml overnight      ROS  Review of Systems   Constitutional: Negative for chills and fever.   HENT: Negative for congestion and nosebleeds.    Eyes: Negative for photophobia, pain and discharge.   Respiratory: Positive for shortness of breath. Negative for sputum production and stridor.    Cardiovascular: Negative for chest pain, palpitations and orthopnea.   Gastrointestinal: Negative for abdominal pain and nausea.   Genitourinary: Negative for frequency and urgency.   Musculoskeletal: Positive for myalgias. Negative for back pain and neck pain.   Skin: Negative for itching  and rash.   Neurological: Negative for focal weakness and seizures.   Psychiatric/Behavioral: The patient is not nervous/anxious and does not have insomnia.      Past medical, surgical, social, and family history reviewed and unchanged from admission except as noted in assessment and plan.    Medications: Reviewed in MAR  Current Facility-Administered Medications   Medication Dose Frequency Provider Last Rate Last Dose   • furosemide (LASIX) injection 40 mg  40 mg BID DIURETIC Du Noble M.D.       • ferrous sulfate tablet 325 mg  325 mg BID WITH MEALS Griselda Barclay M.D.       • metoprolol SR (TOPROL XL) tablet 50 mg  50 mg DAILY Brennen Lyn M.D.   50 mg at 07/14/20 0500   • nitroglycerin (NITROSTAT) tablet 0.4 mg  0.4 mg Q5 MIN PRN Gayle Gomez M.D.       • dronabinol (MARINOL) capsule 2.5 mg  2.5 mg BEFORE LUNCH AND DINNER Du Noble M.D.   2.5 mg at 07/14/20 1248   • omeprazole (PRILOSEC) capsule 20 mg  20 mg DAILY Kathy Kong, A.P.R.N.   20 mg at 07/14/20 0500   • prochlorperazine (COMPAZINE) injection 10 mg  10 mg Q6HRS PRN Kathy Kong, A.P.R.N.   10 mg at 07/10/20 0928   • losartan (COZAAR) tablet 25 mg  25 mg Q DAY Kareem Danielson D.O.   25 mg at 07/14/20 0501   • atorvastatin (LIPITOR) tablet 40 mg  40 mg Q EVENING Kareem Danielson D.O.   40 mg at 07/13/20 1746   • insulin regular (HUMULIN R) injection 2-9 Units  2-9 Units Q6HRS Barrington Esteban M.D.   Stopped at 07/12/20 1200    And   • glucose 4 g chewable tablet 16 g  16 g Q15 MIN PRN Barrington Esteban M.D.        And   • dextrose 50% (D50W) injection 50 mL  50 mL Q15 MIN PRN Barrington Esteban M.D.       • oxyCODONE immediate-release (ROXICODONE) tablet 5 mg  5 mg Q4HRS ABBI Ordoñez M.D.   5 mg at 07/09/20 2004    Or   • oxyCODONE immediate release (ROXICODONE) tablet 10 mg  10 mg Q4HRS ABBI Ordoñez M.D.   10 mg at 07/08/20 0335   • Pharmacy Consult Request ...Pain Management Review 1 Each  1 Each  "PHARMACY TO DOSE Eligio Culp M.D.       • gabapentin (NEURONTIN) capsule 300 mg  300 mg TID Zeus Yepez M.D.   300 mg at 07/14/20 1218   • tamsulosin (FLOMAX) capsule 0.4 mg  0.4 mg AFTER BREAKFAST Zeus Yepez M.D.   0.4 mg at 07/14/20 0923   • senna-docusate (PERICOLACE or SENOKOT S) 8.6-50 MG per tablet 2 Tab  2 Tab BID Zeus Yepez M.D.   2 Tab at 07/14/20 0500    And   • polyethylene glycol/lytes (MIRALAX) PACKET 1 Packet  1 Packet QDAY ABBI Yepez M.D.   1 Packet at 07/07/20 1127    And   • magnesium hydroxide (MILK OF MAGNESIA) suspension 30 mL  30 mL QDAY ABBI Yepez M.D.   30 mL at 07/10/20 0927    And   • bisacodyl (DULCOLAX) suppository 10 mg  10 mg QDAY PRVARGHESE Yepez M.D.       • ondansetron (ZOFRAN) syringe/vial injection 4 mg  4 mg Q4HRS ABBI Yepez M.D.   4 mg at 07/12/20 0316   • ondansetron (ZOFRAN ODT) dispertab 4 mg  4 mg Q4HRS ABBI Yepez M.D.       • acetaminophen (TYLENOL) tablet 650 mg  650 mg Q6HRS PRVARGHESE Yepez M.D.       • fentaNYL (SUBLIMAZE) injection 25 mcg  25 mcg Q2HRS PRVARGHESE Yepez M.D.   25 mcg at 07/05/20 2115   Last reviewed on 7/4/2020 11:17 PM by David Barr    No Known Allergies    Physical Exam  Body mass index is 25.11 kg/m². /66   Pulse 95   Temp 37.3 °C (99.2 °F) (Temporal)   Resp 20   Ht 1.727 m (5' 8\")   Wt 74.9 kg (165 lb 2 oz)   SpO2 100%    Vitals:    07/14/20 0800 07/14/20 0801 07/14/20 0821 07/14/20 1150   BP:   119/70 109/66   Pulse: 94  91 95   Resp: 17  18 20   Temp:   36.3 °C (97.4 °F) 37.3 °C (99.2 °F)   TempSrc:   Temporal Temporal   SpO2: 99% 98% 100% 100%   Weight:       Height:        Oxygen Therapy:  Pulse Oximetry: 100 %, O2 (LPM): 2, O2 Delivery Device: Oxymask    General: no apparent distress  Eyes: nl conjunctiva  ENT: OP clear  Neck: no JVD   Lungs: normal respiratory effort, CTAB  Heart: RRR, no murmurs, no rubs or gallops,   EXT: right AKA, No " edema bilateral lower extremities. 2+ pedal pulses in left limb. no cyanosis  Abdomen: soft, non tender, non distended  Neurological: No focal deficits  Psychiatric: Appropriate affect, A/O x 3  Skin: Warm extremities    Labs (personally reviewed and notable for):   Recent Results (from the past 24 hour(s))   ACCU-CHEK GLUCOSE    Collection Time: 07/13/20  5:53 PM   Result Value Ref Range    Glucose - Accu-Ck 94 65 - 99 mg/dL   ACCU-CHEK GLUCOSE    Collection Time: 07/13/20 11:18 PM   Result Value Ref Range    Glucose - Accu-Ck 95 65 - 99 mg/dL   CBC WITH DIFFERENTIAL    Collection Time: 07/14/20  1:10 AM   Result Value Ref Range    WBC 8.7 4.8 - 10.8 K/uL    RBC 2.31 (L) 4.70 - 6.10 M/uL    Hemoglobin 7.3 (L) 14.0 - 18.0 g/dL    Hematocrit 22.2 (L) 42.0 - 52.0 %    MCV 96.1 81.4 - 97.8 fL    MCH 31.6 27.0 - 33.0 pg    MCHC 32.9 (L) 33.7 - 35.3 g/dL    RDW 59.7 (H) 35.9 - 50.0 fL    Platelet Count 199 164 - 446 K/uL    MPV 10.6 9.0 - 12.9 fL    Neutrophils-Polys 74.50 (H) 44.00 - 72.00 %    Lymphocytes 11.80 (L) 22.00 - 41.00 %    Monocytes 10.20 0.00 - 13.40 %    Eosinophils 2.40 0.00 - 6.90 %    Basophils 0.30 0.00 - 1.80 %    Immature Granulocytes 0.80 0.00 - 0.90 %    Nucleated RBC 0.20 /100 WBC    Neutrophils (Absolute) 6.44 1.82 - 7.42 K/uL    Lymphs (Absolute) 1.02 1.00 - 4.80 K/uL    Monos (Absolute) 0.88 (H) 0.00 - 0.85 K/uL    Eos (Absolute) 0.21 0.00 - 0.51 K/uL    Baso (Absolute) 0.03 0.00 - 0.12 K/uL    Immature Granulocytes (abs) 0.07 0.00 - 0.11 K/uL    NRBC (Absolute) 0.02 K/uL   Comp Metabolic Panel    Collection Time: 07/14/20  1:10 AM   Result Value Ref Range    Sodium 139 135 - 145 mmol/L    Potassium 3.9 3.6 - 5.5 mmol/L    Chloride 110 96 - 112 mmol/L    Co2 17 (L) 20 - 33 mmol/L    Anion Gap 12.0 7.0 - 16.0    Glucose 102 (H) 65 - 99 mg/dL    Bun 33 (H) 8 - 22 mg/dL    Creatinine 1.62 (H) 0.50 - 1.40 mg/dL    Calcium 7.8 (L) 8.5 - 10.5 mg/dL    AST(SGOT) 16 12 - 45 U/L    ALT(SGPT) 17 2 - 50  U/L    Alkaline Phosphatase 71 30 - 99 U/L    Total Bilirubin 0.2 0.1 - 1.5 mg/dL    Albumin 2.3 (L) 3.2 - 4.9 g/dL    Total Protein 4.8 (L) 6.0 - 8.2 g/dL    Globulin 2.5 1.9 - 3.5 g/dL    A-G Ratio 0.9 g/dL   ESTIMATED GFR    Collection Time: 07/14/20  1:10 AM   Result Value Ref Range    GFR If  51 (A) >60 mL/min/1.73 m 2    GFR If Non  42 (A) >60 mL/min/1.73 m 2   ACCU-CHEK GLUCOSE    Collection Time: 07/14/20  5:15 AM   Result Value Ref Range    Glucose - Accu-Ck 90 65 - 99 mg/dL   APTT    Collection Time: 07/14/20  6:05 AM   Result Value Ref Range    APTT 56.2 (H) 24.7 - 36.0 sec   ACCU-CHEK GLUCOSE    Collection Time: 07/14/20 12:19 PM   Result Value Ref Range    Glucose - Accu-Ck 138 (H) 65 - 99 mg/dL       Cardiac Imaging and Procedures Review:      ECHO 07/13:  Left ventricular ejection fraction is visually estimated to be 25%.  Global hypokinesis.  Mild mitral regurgitation.  Moderate tricuspid regurgitation.  Estimated right ventricular systolic pressure  is 97 mmHg.  Impression and Medical Decision Making:  Principal Problem:    Critical lower limb ischemia POA: Yes  Active Problems:    Acute renal failure superimposed on stage 3 chronic kidney disease (HCC) POA: Yes    Non-STEMI (non-ST elevated myocardial infarction) ([facility]) POA: No    PAD (peripheral artery disease) ([facility]) POA: Yes    Dyslipidemia POA: Yes    Essential hypertension POA: Yes    S/P AKA (above knee amputation), right (HCC) POA: Yes    History of tobacco abuse POA: Yes    Hyponatremia POA: Yes    Hyperkalemia POA: Yes    Hypoglycemia POA: Unknown    Hyperglycemia POA: Unknown    Normocytic anemia POA: Unknown    GI bleed POA: Unknown    Ischemic cardiomyopathy POA: Unknown  Resolved Problems:    * No resolved hospital problems. *      Assessment and plans:     1. 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 Up-titrate metoprolol (try reducing HR to 70-80)  -Would favor conservative for now until renal fiunction improved  -From cardiac standpoint  -heparin gtt can be stopped   -DAPT for now  -heparin gtt can be stopped   -Consider MPI to r/o high risk anatomy as outpatient.   -If patient will be on xarelto long term per vascular surgery, there is no need for ASA as per cardiac point of view. Continue plavix alone with xarelto when there are no signs of active bleeding.      2. Ischemic CMP with acute HF    -New diagnosis of HFrEF with EF of 25 %  -dyspnea with pulmonary edema on CXR  -Careful diuresis  -low salt, cardiac diet  -daily weight  -strict I's and O's  -lasix can be transitioned to oral  -continue BB, ACEI  -consider adding aldactone when BP allows     3. CKD with aute worsening likely due to contrast  -Per primary     4. PAD s/p AKA and fem pop bypass with new stent  -Per vascular and primary     Will follow    Thank you for allowing me to participate in the care of this patient.  Please call or text via [employer] if clarification would be useful.

## 2020-07-14 NOTE — RESPIRATORY CARE
Oxygen Rounds      Patient found on    O2 L/m:  ___3______    Oxygen device:  ___nc_____   Spo2: ____99_____%      Patient titrated to   O2 L/m: ___2_____   Oxygen device: _____nc____   Spo2: _____98____%   Respiratory device skin site inspection completed.

## 2020-07-14 NOTE — CARE PLAN
Problem: Discharge Barriers/Planning  Goal: Patient's continuum of care needs will be met  Outcome: PROGRESSING SLOWER THAN EXPECTED  Note: Assess discharge barriers. Collaborate with patient and other staff regarding goals during hospital stay and discharge criteria.      Problem: Communication  Goal: The ability to communicate needs accurately and effectively will improve  Outcome: PROGRESSING AS EXPECTED  Note: Call light within reach. Educated pt to use call light as needed. Reorient and re-educate as needed. Continue to monitor.

## 2020-07-14 NOTE — ASSESSMENT & PLAN NOTE
LVEF 25%  RVSP 97 mmHg  Started on IV Lasix 40 mg twice daily, losartan and metoprolol  Adjust medication as needed  Cardiology following

## 2020-07-14 NOTE — DISCHARGE PLANNING
Notified by patient's sister Liza that the patient has a SW Dipt[facility] [place] with [employer] (phone# [phone]) and that she is working on the group home waiver for this patient.

## 2020-07-14 NOTE — ASSESSMENT & PLAN NOTE
Hemoglobin is pending for today  Transfuse as needed with target hemoglobin above 7  Currently holding aspirin, Plavix and Xarelto  EGD yesterday  Needs GI recommendation for restarting antiplatelets and Xarelto

## 2020-07-14 NOTE — CONSULTS
Gastroenterology Consult Note:    Silver Silverman M.D.  Date & Time note created:    7/14/2020   10:40 AM     Referring MD:  Dr. Noble    Patient ID:   Name:             Antonio Walker   YOB: 1950  Age:                 70 y.o.  male   MRN:               [id number]                                                             Reason for Consult:        Possible GIB on AC    History of Present Illness:      Mr. Antonio Walker is a 70 y.o. male with history of Rt AKA 2004,  severe PAD with prior femoral artery bypass occlusions, CAD, CKD stage 3, DM and chronic normocytic anemia ( baseline GB 9-10 ) who presented on 7/4/2020 with acute left leg pain x3 days,Vascualr surgery consulted patient found to have acute left leg ischemia secondary to Femoral-Popliteal bybass occlusion, Patient underwent bypass graft stent 7/5 with minimal blood loss and was started on Xarelto + DATP. His hospitalization course complicated with possible NSTEMI, Cardiology consulted with recs to switch to Heparin drip and manage medically as patient is not a candidate for intervention.     Patient HGB dropped to 6.3 following vascular intervention 7/9 with positive FOBT with 3 episodes of black non tarry stool therefore GI consulted.     Review of Systems:      Constitutional: Denies fevers, Denies weight changes  Eyes: Denies changes in vision, no eye pain  Ears/Nose/Throat/Mouth: Denies nasal congestion or sore throat   Cardiovascular: Denies chest pain or palpitations.  Respiratory: Denies shortness of breath, cough, and wheezing.  Gastrointestinal/Hepatic: See HPI   Genitourinary: Denies dysuria or frequency  Musculoskeletal/Rheum: Denies  joint pain and swelling,   Skin: Denies rash  Neurological: Denies headache, confusion, memory loss or focal weakness/parasthesias  Psychiatric: denies mood disorder   Endocrine: Kateryna thyroid problems  Heme/Oncology/Lymph Nodes: Denies enlarged lymph nodes, denies  brusing or known bleeding disorder  All other systems were reviewed and are negative (AMA/CMS criteria)                Past Medical History:   History reviewed. No pertinent past medical history.      Past Surgical History:  Past Surgical History:   Procedure Laterality Date   • ANGIOGRAM  1/1/2020    Procedure: ANGIOGRAM;  Surgeon: Gage Valles M.D.;  Location: SURGERY [facility];  Service: General   • FEMORAL TIBIAL BYPASS Left 1/1/2020    Procedure: CREATION, BYPASS, ARTERIAL, FEMORAL TO TIBIAL;  Surgeon: Gage Valles M.D.;  Location: SURGERY [facility];  Service: General   • FEMORAL ENDARTERECTOMY Left 12/27/2019    Procedure: ENDARTERECTOMY, FEMORAL;  Surgeon: Gage Valles M.D.;  Location: SURGERY [facility];  Service: General   • ANGIOGRAM Left 12/27/2019    Procedure: ANGIOGRAM;  Surgeon: Gage Valles M.D.;  Location: SURGERY [facility];  Service: General       Hospital Medications:    Current Facility-Administered Medications:   •  furosemide (LASIX) injection 40 mg, 40 mg, Intravenous, BID DIURETIC, Du Noble M.D.  •  cefTRIAXone (ROCEPHIN) 1 g in  mL IVPB, 1 g, Intravenous, Q24HRS, Gayle Gomez M.D., Stopped at 07/14/20 0531  •  azithromycin (ZITHROMAX) tablet 500 mg, 500 mg, Oral, DAILY, Du Noble M.D., 500 mg at 07/14/20 0500  •  metoprolol SR (TOPROL XL) tablet 50 mg, 50 mg, Oral, DAILY, Brennen Lyn M.D., 50 mg at 07/14/20 0500  •  nitroglycerin (NITROSTAT) tablet 0.4 mg, 0.4 mg, Sublingual, Q5 MIN PRN, Gayle Gomez M.D.  •  dronabinol (MARINOL) capsule 2.5 mg, 2.5 mg, Oral, BEFORE LUNCH AND DINNER, Du Noble M.D., 2.5 mg at 07/13/20 1746  •  omeprazole (PRILOSEC) capsule 20 mg, 20 mg, Oral, DAILY, Kathy Kong, A.P.R.N., 20 mg at 07/14/20 0500  •  prochlorperazine (COMPAZINE) injection 10 mg, 10 mg, Intravenous, Q6HRS PRN, LONNIE Spangler.P.R.N., 10 mg at 07/10/20 0928  •  losartan (COZAAR) tablet 25 mg, 25 mg, Oral, Q DAY, Kareem  BIGG Danielson D.O., 25 mg at 07/14/20 0501  •  atorvastatin (LIPITOR) tablet 40 mg, 40 mg, Oral, Q EVENING, Kareem Danielson D.O., 40 mg at 07/13/20 1746  •  insulin regular (HUMULIN R) injection 2-9 Units, 2-9 Units, Subcutaneous, Q6HRS, Stopped at 07/12/20 1200 **AND** POC Blood Glucose, , , Q6H **AND** NOTIFY MD and PharmD, , , Once **AND** glucose 4 g chewable tablet 16 g, 16 g, Oral, Q15 MIN PRN **AND** dextrose 50% (D50W) injection 50 mL, 50 mL, Intravenous, Q15 MIN PRN, Barrington Esteban M.D.  •  oxyCODONE immediate-release (ROXICODONE) tablet 5 mg, 5 mg, Oral, Q4HRS PRN, 5 mg at 07/09/20 2004 **OR** oxyCODONE immediate release (ROXICODONE) tablet 10 mg, 10 mg, Oral, Q4HRS PRN, Barrington Ordoñez M.D., 10 mg at 07/08/20 0335  •  Pharmacy Consult Request ...Pain Management Review 1 Each, 1 Each, Other, PHARMACY TO DOSE, Eligio Culp M.D.  •  gabapentin (NEURONTIN) capsule 300 mg, 300 mg, Oral, TID, Zeus Yepez M.D., 300 mg at 07/14/20 0500  •  tamsulosin (FLOMAX) capsule 0.4 mg, 0.4 mg, Oral, AFTER BREAKFAST, Zeus Yepez M.D., 0.4 mg at 07/14/20 0923  •  senna-docusate (PERICOLACE or SENOKOT S) 8.6-50 MG per tablet 2 Tab, 2 Tab, Oral, BID, 2 Tab at 07/14/20 0500 **AND** polyethylene glycol/lytes (MIRALAX) PACKET 1 Packet, 1 Packet, Oral, QDAY PRN, 1 Packet at 07/07/20 1127 **AND** magnesium hydroxide (MILK OF MAGNESIA) suspension 30 mL, 30 mL, Oral, QDAY PRN, 30 mL at 07/10/20 0927 **AND** bisacodyl (DULCOLAX) suppository 10 mg, 10 mg, Rectal, QDAY PRN, Zeus Yepez M.D.  •  ondansetron (ZOFRAN) syringe/vial injection 4 mg, 4 mg, Intravenous, Q4HRS PRN, Zeus Yepez M.D., 4 mg at 07/12/20 0316  •  ondansetron (ZOFRAN ODT) dispertab 4 mg, 4 mg, Oral, Q4HRS PRN, Zeus Yepez M.D.  •  acetaminophen (TYLENOL) tablet 650 mg, 650 mg, Oral, Q6HRS PRN, Zeus Yepez M.D.  •  fentaNYL (SUBLIMAZE) injection 25 mcg, 25 mcg, Intravenous, Q2HRS PRN, Zeus Yepez M.D., 25 mcg at  07/05/20 2115    Current Outpatient Medications:  Current Facility-Administered Medications   Medication Dose Route Frequency Provider Last Rate Last Dose   • furosemide (LASIX) injection 40 mg  40 mg Intravenous BID DIURETIC Du Noble M.D.       • cefTRIAXone (ROCEPHIN) 1 g in  mL IVPB  1 g Intravenous Q24HRS Gayle Gomez M.D.   Stopped at 07/14/20 0531   • azithromycin (ZITHROMAX) tablet 500 mg  500 mg Oral DAILY Du Noble M.D.   500 mg at 07/14/20 0500   • metoprolol SR (TOPROL XL) tablet 50 mg  50 mg Oral DAILY Brennen Lyn M.D.   50 mg at 07/14/20 0500   • nitroglycerin (NITROSTAT) tablet 0.4 mg  0.4 mg Sublingual Q5 MIN PRN Gayle Gomez M.D.       • dronabinol (MARINOL) capsule 2.5 mg  2.5 mg Oral BEFORE LUNCH AND DINNER Du Noble M.D.   2.5 mg at 07/13/20 1746   • omeprazole (PRILOSEC) capsule 20 mg  20 mg Oral DAILY Kathy Kong, A.P.R.N.   20 mg at 07/14/20 0500   • prochlorperazine (COMPAZINE) injection 10 mg  10 mg Intravenous Q6HRS PRN Kathy Kong, A.P.R.N.   10 mg at 07/10/20 0928   • losartan (COZAAR) tablet 25 mg  25 mg Oral Q DAY Kareem Danielson D.O.   25 mg at 07/14/20 0501   • atorvastatin (LIPITOR) tablet 40 mg  40 mg Oral Q EVENING Kareem Danielson D.O.   40 mg at 07/13/20 1746   • insulin regular (HUMULIN R) injection 2-9 Units  2-9 Units Subcutaneous Q6HRS Barrington Esteban M.D.   Stopped at 07/12/20 1200    And   • glucose 4 g chewable tablet 16 g  16 g Oral Q15 MIN PRN Barrington Esteban M.D.        And   • dextrose 50% (D50W) injection 50 mL  50 mL Intravenous Q15 MIN PRN Barrington Esteban M.D.       • oxyCODONE immediate-release (ROXICODONE) tablet 5 mg  5 mg Oral Q4HRS PRVARGHESE Ordoñez M.D.   5 mg at 07/09/20 2004    Or   • oxyCODONE immediate release (ROXICODONE) tablet 10 mg  10 mg Oral Q4HRS PRVARGHESE Ordoñez M.D.   10 mg at 07/08/20 0335   • Pharmacy Consult Request ...Pain Management Review 1 Each  1 Each Other PHARMACY TO DOSE  Eligio Culp M.D.       • gabapentin (NEURONTIN) capsule 300 mg  300 mg Oral TID Zeus Yepez M.D.   300 mg at 07/14/20 0500   • tamsulosin (FLOMAX) capsule 0.4 mg  0.4 mg Oral AFTER BREAKFAST Zeus Yepez M.D.   0.4 mg at 07/14/20 0923   • senna-docusate (PERICOLACE or SENOKOT S) 8.6-50 MG per tablet 2 Tab  2 Tab Oral BID Zeus Yepez M.D.   2 Tab at 07/14/20 0500    And   • polyethylene glycol/lytes (MIRALAX) PACKET 1 Packet  1 Packet Oral QDAY ABBI Yepez M.D.   1 Packet at 07/07/20 1127    And   • magnesium hydroxide (MILK OF MAGNESIA) suspension 30 mL  30 mL Oral QDAY ABBI Yepez M.D.   30 mL at 07/10/20 0927    And   • bisacodyl (DULCOLAX) suppository 10 mg  10 mg Rectal QDAY PRVARGHESE Yepez M.D.       • ondansetron (ZOFRAN) syringe/vial injection 4 mg  4 mg Intravenous Q4HRS ABBI Yepez M.D.   4 mg at 07/12/20 0316   • ondansetron (ZOFRAN ODT) dispertab 4 mg  4 mg Oral Q4HRS ABBI Yepez M.D.       • acetaminophen (TYLENOL) tablet 650 mg  650 mg Oral Q6HRS ABBI Yepez M.D.       • fentaNYL (SUBLIMAZE) injection 25 mcg  25 mcg Intravenous Q2HRS ABBI Yepez M.D.   25 mcg at 07/05/20 2115       Medication Allergy:  No Known Allergies    Family History:  History reviewed. No pertinent family history.    Social History:  Social History     Socioeconomic History   • Marital status:      Spouse name: Not on file   • Number of children: Not on file   • Years of education: Not on file   • Highest education level: Not on file   Occupational History   • Not on file   Social Needs   • Financial resource strain: Not on file   • Food insecurity     Worry: Not on file     Inability: Not on file   • Transportation needs     Medical: Not on file     Non-medical: Not on file   Tobacco Use   • Smoking status: Former Smoker     Packs/day: 0.00   • Smokeless tobacco: Never Used   Substance and Sexual Activity   • Alcohol use: Never      "Frequency: Never   • Drug use: Never   • Sexual activity: Not on file   Lifestyle   • Physical activity     Days per week: Not on file     Minutes per session: Not on file   • Stress: Not on file   Relationships   • Social connections     Talks on phone: Not on file     Gets together: Not on file     Attends Sabianism service: Not on file     Active member of club or organization: Not on file     Attends meetings of clubs or organizations: Not on file     Relationship status: Not on file   • Intimate partner violence     Fear of current or ex partner: Not on file     Emotionally abused: Not on file     Physically abused: Not on file     Forced sexual activity: Not on file   Other Topics Concern   • Not on file   Social History Narrative   • Not on file         Physical Exam:  Vitals/ General Appearance:   Weight/BMI: Body mass index is 25.11 kg/m².  /70   Pulse 91   Temp 36.3 °C (97.4 °F) (Temporal)   Resp 18   Ht 1.727 m (5' 8\")   Wt 74.9 kg (165 lb 2 oz)   SpO2 100%   Vitals:    07/14/20 0400 07/14/20 0800 07/14/20 0801 07/14/20 0821   BP: 129/77   119/70   Pulse: 100 94  91   Resp: 20 17  18   Temp: 36.1 °C (97 °F)   36.3 °C (97.4 °F)   TempSrc: Temporal   Temporal   SpO2: 100% 99% 98% 100%   Weight:       Height:         Oxygen Therapy:  Pulse Oximetry: 100 %, O2 (LPM): 2, O2 Delivery Device: Oxymask    Constitutional:   Well developed, Well nourished, No acute distress  HENMT:  Normocephalic, Atraumatic, Oropharynx moist mucous membranes, No oral exudates, Nose normal.  No thyromegaly.  Eyes:  EOMI, Conjunctiva normal, No discharge.  Neck:  Normal range of motion, No cervical tenderness,  no JVD.  Cardiovascular:  Normal heart rate, Normal rhythm, No murmurs, No rubs, No gallops.   Extremitites with intact distal pulses, no cyanosis, or edema.  Lungs:  Normal breath sounds, breath sounds clear to auscultation bilaterally,  no crackles, no wheezing.   Abdomen: Bowel sounds normal, Soft, No " tenderness, No guarding, No rebound, No masses, No hepatosplenomegaly.  Skin: Warm, Dry, No erythema, No rash, no induration.  Neurologic: Alert & oriented x 3, No focal deficits noted, cranial nerves II through X are grossly intact.  Psychiatric: Affect normal, Judgment normal, Mood normal.      MDM (Data Review):     Records reviewed and summarized in current documentation    Lab Data Review:  Recent Results (from the past 24 hour(s))   ACCU-CHEK GLUCOSE    Collection Time: 07/13/20 11:55 AM   Result Value Ref Range    Glucose - Accu-Ck 117 (H) 65 - 99 mg/dL   EC-ECHOCARDIOGRAM COMPLETE W/O CONT    Collection Time: 07/13/20 12:12 PM   Result Value Ref Range    Eject.Frac. MOD BP 30.82     Eject.Frac. MOD 4C 23.08     Eject.Frac. MOD 2C 29.84     Left Ventrical Ejection Fraction 25    ACCU-CHEK GLUCOSE    Collection Time: 07/13/20  5:53 PM   Result Value Ref Range    Glucose - Accu-Ck 94 65 - 99 mg/dL   ACCU-CHEK GLUCOSE    Collection Time: 07/13/20 11:18 PM   Result Value Ref Range    Glucose - Accu-Ck 95 65 - 99 mg/dL   CBC WITH DIFFERENTIAL    Collection Time: 07/14/20  1:10 AM   Result Value Ref Range    WBC 8.7 4.8 - 10.8 K/uL    RBC 2.31 (L) 4.70 - 6.10 M/uL    Hemoglobin 7.3 (L) 14.0 - 18.0 g/dL    Hematocrit 22.2 (L) 42.0 - 52.0 %    MCV 96.1 81.4 - 97.8 fL    MCH 31.6 27.0 - 33.0 pg    MCHC 32.9 (L) 33.7 - 35.3 g/dL    RDW 59.7 (H) 35.9 - 50.0 fL    Platelet Count 199 164 - 446 K/uL    MPV 10.6 9.0 - 12.9 fL    Neutrophils-Polys 74.50 (H) 44.00 - 72.00 %    Lymphocytes 11.80 (L) 22.00 - 41.00 %    Monocytes 10.20 0.00 - 13.40 %    Eosinophils 2.40 0.00 - 6.90 %    Basophils 0.30 0.00 - 1.80 %    Immature Granulocytes 0.80 0.00 - 0.90 %    Nucleated RBC 0.20 /100 WBC    Neutrophils (Absolute) 6.44 1.82 - 7.42 K/uL    Lymphs (Absolute) 1.02 1.00 - 4.80 K/uL    Monos (Absolute) 0.88 (H) 0.00 - 0.85 K/uL    Eos (Absolute) 0.21 0.00 - 0.51 K/uL    Baso (Absolute) 0.03 0.00 - 0.12 K/uL    Immature Granulocytes  (abs) 0.07 0.00 - 0.11 K/uL    NRBC (Absolute) 0.02 K/uL   Comp Metabolic Panel    Collection Time: 07/14/20  1:10 AM   Result Value Ref Range    Sodium 139 135 - 145 mmol/L    Potassium 3.9 3.6 - 5.5 mmol/L    Chloride 110 96 - 112 mmol/L    Co2 17 (L) 20 - 33 mmol/L    Anion Gap 12.0 7.0 - 16.0    Glucose 102 (H) 65 - 99 mg/dL    Bun 33 (H) 8 - 22 mg/dL    Creatinine 1.62 (H) 0.50 - 1.40 mg/dL    Calcium 7.8 (L) 8.5 - 10.5 mg/dL    AST(SGOT) 16 12 - 45 U/L    ALT(SGPT) 17 2 - 50 U/L    Alkaline Phosphatase 71 30 - 99 U/L    Total Bilirubin 0.2 0.1 - 1.5 mg/dL    Albumin 2.3 (L) 3.2 - 4.9 g/dL    Total Protein 4.8 (L) 6.0 - 8.2 g/dL    Globulin 2.5 1.9 - 3.5 g/dL    A-G Ratio 0.9 g/dL   ESTIMATED GFR    Collection Time: 07/14/20  1:10 AM   Result Value Ref Range    GFR If  51 (A) >60 mL/min/1.73 m 2    GFR If Non  42 (A) >60 mL/min/1.73 m 2   ACCU-CHEK GLUCOSE    Collection Time: 07/14/20  5:15 AM   Result Value Ref Range    Glucose - Accu-Ck 90 65 - 99 mg/dL   APTT    Collection Time: 07/14/20  6:05 AM   Result Value Ref Range    APTT 56.2 (H) 24.7 - 36.0 sec       Imaging/Procedures Review:    Imaging reviewed.       MDM (Assessment and Plan):     Patient Active Problem List    Diagnosis Date Noted   • Non-STEMI (non-ST elevated myocardial infarction) ([facility]) 07/12/2020     Priority: High   • Arterial occlusion due to arteriosclerosis 12/26/2019     Priority: High   • Critical lower limb ischemia 12/26/2019     Priority: High   • Acute renal failure superimposed on stage 3 chronic kidney disease ([facility]) 12/26/2019     Priority: High   • PAD (peripheral artery disease) ([facility]) 12/26/2019     Priority: Medium   • Dyslipidemia 12/26/2019     Priority: Medium   • Essential hypertension 12/26/2019     Priority: Medium   • S/P AKA (above knee amputation), right ([facility]) 12/26/2019     Priority: Medium   • History of tobacco abuse 12/26/2019     Priority: Medium   • GI bleed 07/14/2020   •  Ischemic cardiomyopathy 07/14/2020   • Normocytic anemia 07/11/2020   • Hypoglycemia 07/06/2020   • Hyperglycemia 07/06/2020   • Hyponatremia 07/04/2020   • Hyperkalemia 07/04/2020     Assessment and plan:     # Acute on to of chronic multifactorial normocytic anemia most likely secondary to upper GIB in the setting of recently started AC and DAPT  - IV PPI  - Patient is no longer on AC or DAPT in the setting of possible GIB.   - Clear liquid diet  - NPO midnight.   - For EGD tomorrow with Anaesthesia, If EGD without obvious source of bleeding, will proceed with Colonoscopy day after tomorrow.     Thank your for the opportunity to assist in the care of your patient.  Please call for any questions or concerns.    Silver Silverman M.D.   [id number] resident.

## 2020-07-14 NOTE — PROGRESS NOTES
Received bedside report from LUIS FERNANDO Lobato. POC discussed. First assessment completed, call light within reach. Fall precautions within place. Patient resting in bed. Will continue to monitor.

## 2020-07-14 NOTE — PROGRESS NOTES
Hospital Medicine Daily Progress Note    Date of Service  7/14/2020      Chief Complaint  70 y.o. male admitted 7/4/2020 with left lower extremity pain    Hospital Course    Mr. Antonio Walker is a 70 y.o. male with history of severe peripheral arterial disease with prior femoral artery bypass occlusions who presented on 7/4/2020 with acute left leg pain x3 days.  Pain starts in the mid thigh and goes all the way down to the distal feet and toes.  Also reports associated weakness and numbness.  Son confirms thrombosis vascular surgery Dr. Culp was consulted for critical limb ischemia.  Patient was started on a heparin drip and later TPA.  Status post lower extremity angiogram with stent placement in the left popliteal artery.  Patient was started on clopidogrel, aspirin, rivaroxaban.          Interval Problem Update  7/10 No acute issue overnight.  Vascular surgery stated that he can be discharged and follow-up with him as an outpatient.  Home health is ordered.    7/11 I updated him about his hemoglobin this morning.  Patient is yet to have bowel movement to have occult blood test.  Need to rule that out before he get discharge to SNF.    7/12: The patient was transferred to telemetry floor due to chest pain cardiology was consulted overnight and following.  His troponin is over 1600 and the result will be updated to cardiology.    7/13: Per cardiology the patient is not a candidate for any intervention.  Chest x-ray showed mild edema.  I started him on IV Lasix 20 mg twice daily.  I discussed the case with cardiology regarding medical management.  The patient is still constipated and will try to get stool sample for occult blood test.    7/14: I saw and examined the patient and updated him about his medical condition.  His echo showed LVEF of 25%.  Cardiology is following and recommending to continue IV heparin infusion until today.  In addition he was positive for occult blood stool and because of  that I consulted GI.  He is vasculopathic and he is on dual antiplatelet therapy and Xarelto for it.  I will wait for GI to see the patient and follow the recommendation.    Review of Systems  Review of Systems   Constitutional: Positive for malaise/fatigue. Negative for chills and fever.   HENT: Negative for congestion and nosebleeds.    Eyes: Negative for photophobia, pain and discharge.   Respiratory: Positive for shortness of breath. Negative for sputum production and stridor.    Cardiovascular: Positive for chest pain. Negative for palpitations and orthopnea.   Gastrointestinal: Negative for abdominal pain and nausea.   Genitourinary: Negative for frequency and urgency.   Musculoskeletal: Positive for myalgias. Negative for back pain and neck pain.   Skin: Negative for itching and rash.   Neurological: Negative for focal weakness and seizures.   Psychiatric/Behavioral: The patient is not nervous/anxious and does not have insomnia.         Physical Exam  Temp:  [36.1 °C (97 °F)-37.6 °C (99.7 °F)] 36.1 °C (97 °F)  Pulse:  [] 94  Resp:  [17-26] 17  BP: (115-131)/(73-78) 129/77  SpO2:  [95 %-100 %] 98 %    Physical Exam  Vitals signs reviewed.   HENT:      Head: Normocephalic and atraumatic.      Nose: No congestion.   Eyes:      Extraocular Movements: Extraocular movements intact.      Pupils: Pupils are equal, round, and reactive to light.   Neck:      Musculoskeletal: Normal range of motion and neck supple.   Cardiovascular:      Rate and Rhythm: Normal rate and regular rhythm.      Pulses: Normal pulses.      Heart sounds: Normal heart sounds.   Pulmonary:      Effort: Pulmonary effort is normal.      Breath sounds: Normal breath sounds.   Abdominal:      General: Bowel sounds are normal. There is no distension.      Palpations: Abdomen is soft. There is no mass.   Musculoskeletal:         General: No swelling or tenderness.   Skin:     General: Skin is warm.      Findings: No erythema.   Neurological:       General: No focal deficit present.      Mental Status: He is alert.         Fluids    Intake/Output Summary (Last 24 hours) at 7/14/2020 0804  Last data filed at 7/14/2020 0531  Gross per 24 hour   Intake 1390.52 ml   Output 1850 ml   Net -459.48 ml       Laboratory  Recent Labs     07/11/20  2300 07/14/20  0110   WBC 11.4* 8.7   RBC 2.76* 2.31*   HEMOGLOBIN 8.5* 7.3*   HEMATOCRIT 26.2* 22.2*   MCV 94.9 96.1   MCH 30.8 31.6   MCHC 32.4* 32.9*   RDW 60.0* 59.7*   PLATELETCT 214 199   MPV 10.9 10.6     Recent Labs     07/11/20  2300 07/13/20  0452 07/14/20  0110   SODIUM 137 136 139   POTASSIUM 4.6 4.7 3.9   CHLORIDE 113* 112 110   CO2 15* 13* 17*   GLUCOSE 149* 109* 102*   BUN 43* 36* 33*   CREATININE 1.52* 1.59* 1.62*   CALCIUM 7.9* 8.1* 7.8*     Recent Labs     07/12/20  0255  07/12/20  2119 07/13/20  0452 07/14/20  0605   APTT 34.1   < > 64.9* 78.5* 56.2*   INR 1.19*  --   --   --   --     < > = values in this interval not displayed.               Imaging  EC-ECHOCARDIOGRAM COMPLETE W/O CONT   Final Result      DX-CHEST-PORTABLE (1 VIEW)   Final Result      Bilateral pulmonary opacities consistent with pulmonary edema and/or multifocal pneumonia, along with bilateral pleural effusions.      CT-CTA CHEST PULMONARY ARTERY W/ RECONS   Final Result      1.  No evidence of pulmonary emboli.   2.  Scattered areas of discoid atelectasis and interstitial opacities in both lungs, along with bilateral pleural effusions, right greater than left.   3.  Ascites.            US-RENAL   Final Result      Unremarkable renal ultrasound.      US-EXTREMITY ARTERY LOWER UNILAT LEFT   Final Result      IR-EXTREMITY ANGIOGRAM-UNILATERAL LEFT    (Results Pending)   IR-ANGIO THROUGH EXISTING CATHETER    (Results Pending)        Assessment/Plan  * Critical lower limb ischemia- (present on admission)  Assessment & Plan  -main diagnosis, likely acute occlusion of the L femoral bypass  Status post angiogram and angioplasty on  7/6/2020.  Continue aspirin, clopidogrel, rivaroxaban.  Vascular surgery is following, Dr. Culp and recommended to follow-up as an outpatient    Non-STEMI (non-ST elevated myocardial infarction) ([facility])  Assessment & Plan  Per cardiology the patient is not a candidate for intervention and recommended medical management  On heparin infusion, need cardiology recommendation regarding continuation of anticoagulation  Echocardiogram pending    Acute renal failure superimposed on stage 3 chronic kidney disease ([facility])- (present on admission)  Assessment & Plan  -2/2 dehydration?  Improved  -baseline CKD stage III around 1.5-1.8      History of tobacco abuse- (present on admission)  Assessment & Plan  -counseled for more than 4 minutes on tobacco cessation [id number]   -I have ordered nicotine replacement therapy  Counseled extensively on smoking and peripheral vascular disease    S/P AKA (above knee amputation), right ([facility])- (present on admission)  Assessment & Plan  -appears well healed    Essential hypertension- (present on admission)  Assessment & Plan  Continue metoprolol.  Started on losartan.  And Lasix    Dyslipidemia- (present on admission)  Assessment & Plan  Start atorvastatin    PAD (peripheral artery disease) ([facility])- (present on admission)  Assessment & Plan  -severe, US pending  -appears to have re-occlusion of femoral bypass on the LLE  -critical limb ischemia, vascular surgery consulted, Dr. Culp  -IV fentanyl and Oral oxy IR PRN pain  -monitor blood pressure  Status post angiogram and angioplasty with stent placement 7/6.  Continue aspirin, clopidogrel, rivaroxaban(holding due to heparin infusion)    Ischemic cardiomyopathy  Assessment & Plan  LVEF 25%  RVSP 97 mmHg  Started on IV Lasix 40 mg twice daily, losartan and metoprolol  Adjust medication as needed  Cardiology following    GI bleed  Assessment & Plan  Hemoglobin 7.3 today  Trending down slowly  Transfuse as needed  He is on dual antiplatelet therapy  and heparin infusion for his cardiovascular disease and peripheral vascular disease  I consulted GI  Follow CBC closely today  Since hemoglobin is trending down I will hold antiplatelet therapy and heparin infusion for now    Normocytic anemia  Assessment & Plan  The patient received 1 unit of blood transfusion yesterday  FOBT is pending  If positive then will need to consult GI      Hyperglycemia  Assessment & Plan  Monitor blood sugars as above.    Hypoglycemia  Assessment & Plan    Results from last 7 days   Lab Units 07/07/20  1116 07/07/20  0540 07/07/20  0012 07/06/20  1912 07/06/20  1512 07/05/20  2211   ACCU CHECK GLUCOSE 788 mg/dL 96 86 77 89 85 61*     I have ordered insulin sliding scale with D50 and glucagon for hypoglycemia per protocol.  Encourage p.o. intake  Diabetic education      Hyperkalemia- (present on admission)  Assessment & Plan  -mild, 2/2 Acute on CKD Stage III  -Resolved    Hyponatremia- (present on admission)  Assessment & Plan  -2/2 hypovolemia presumed  Resolved.  Hold outpatient HCTZ  -start IVF's with NS and correct slowly, daily NA+       VTE prophylaxis: heparin

## 2020-07-15 PROBLEM — I50.43 ACUTE ON CHRONIC COMBINED SYSTOLIC AND DIASTOLIC HEART FAILURE (HCC): Status: ACTIVE | Noted: 2020-01-01

## 2020-07-15 NOTE — RESPIRATORY CARE
Oxygen Rounds      Patient found on    O2 L/m:  ___2______    Oxygen device:  _____NC___   Spo2: ____96_____%     Respiratory device skin site inspection completed.

## 2020-07-15 NOTE — THERAPY
Occupational Therapy Contact Note:    Pt w/ hemoglobin of 6.8 and pending EGD today. Will hold and round back as able.     Dipti Pelletier,  Pager: [phone]

## 2020-07-15 NOTE — PROGRESS NOTES
Cardiology Follow-up Consult Note    Date of note:    7/15/2020      Consulting Physician: Du Noble M.D.    Patient ID:  Name:   Antonio Walker   YOB: 1950  Age:   70 y.o.  male   MRN:   [id number]    Chief Complaint   Patient presents with   • Leg Pain     left leg       ID:   A 70 y.o. male with PMHx of severe PAD S/P left femoral endarterectomy and a left femoral to above-knee popliteal artery bypass in 01/2020 and right AKA, DM II, HTN, CKD III, tobacco abuse has been admitted for acute left lower limb ischemia and was found to have fem pop bypass occlusion s/p tPA and intervention of bypass graft with new stent placed on 7/5. Started on Xarelto + DAPT post procedure. His stay was complicated by drop in Hb <7 s/p blood transfusion and NSTEMI (dyspnea and chest pain with elevation in troponin   on 7/12 which peaked up to 1662 without significant dynamic ST-T wave changes)     Interim Events:    -No acute events overnight, patient denied any chest pain and dyspnea has been improved but still on 2 L O2 NC  -Tachy with CA in 80ss-100s, NSR  -saturation is above 90% on 2-3 L O2  -BP WNL  -Heparin gtt stopped yesterday for NSTEMI after 48 hrs  -DAPT and xarelto held due to drop in Hb and FOBT positive  -Hb dropped down to 6.8 yesterday s/p PRBC transfusion; Hb trended up to 8.9   -GI consulted; EGD today       ROS  Review of Systems   Constitutional: Negative for chills and fever.   HENT: Negative for congestion and nosebleeds.    Eyes: Negative for photophobia, pain and discharge.   Respiratory: Positive for shortness of breath. Negative for sputum production and stridor.    Cardiovascular: Negative for chest pain, palpitations and orthopnea.   Gastrointestinal: Negative for abdominal pain and nausea.   Genitourinary: Negative for frequency and urgency.   Musculoskeletal: Positive for myalgias. Negative for back pain and neck pain.   Skin: Negative for itching and rash.   Neurological:  Negative for focal weakness and seizures.   Psychiatric/Behavioral: The patient is not nervous/anxious and does not have insomnia.      Past medical, surgical, social, and family history reviewed and unchanged from admission except as noted in assessment and plan.    Medications: Reviewed in MAR  Current Facility-Administered Medications   Medication Dose Frequency Provider Last Rate Last Dose   • SODIUM CHLORIDE 0.9 % IV SOLN           • lactated ringers infusion   Continuous Royce Garcia M.D. 10 mL/hr at 07/15/20 1201     • [MAR Hold] furosemide (LASIX) injection 40 mg  40 mg BID DIURETIC Du Noble M.D.   40 mg at 07/15/20 0453   • [MAR Hold] ferrous sulfate tablet 325 mg  325 mg BID WITH MEALS Griselda Barclay M.D.   325 mg at 07/15/20 0845   • [MAR Hold] metoprolol SR (TOPROL XL) tablet 50 mg  50 mg DAILY Brennen Lyn M.D.   50 mg at 07/15/20 0454   • [MAR Hold] nitroglycerin (NITROSTAT) tablet 0.4 mg  0.4 mg Q5 MIN PRN Gayle Gomez M.D.       • [MAR Hold] dronabinol (MARINOL) capsule 2.5 mg  2.5 mg BEFORE LUNCH AND DINNER Du Noble M.D.   2.5 mg at 07/14/20 1809   • [MAR Hold] omeprazole (PRILOSEC) capsule 20 mg  20 mg DAILY Kathy FLEMING Heglar, A.P.R.N.   20 mg at 07/15/20 0454   • [MAR Hold] prochlorperazine (COMPAZINE) injection 10 mg  10 mg Q6HRS PRN Kathy FLEMING Heglar, A.P.R.N.   10 mg at 07/10/20 0928   • [MAR Hold] losartan (COZAAR) tablet 25 mg  25 mg Q DAY Kareem Danielson D.O.   25 mg at 07/15/20 0454   • [MAR Hold] atorvastatin (LIPITOR) tablet 40 mg  40 mg Q EVENING Kareem Danielson D.O.   40 mg at 07/14/20 1809   • [MAR Hold] insulin regular (HUMULIN R) injection 2-9 Units  2-9 Units Q6HRS Barrington Esteban M.D.   Stopped at 07/12/20 1200    And   • [MAR Hold] glucose 4 g chewable tablet 16 g  16 g Q15 MIN PRN Barrington Esteban M.D.        And   • [MAR Hold] dextrose 50% (D50W) injection 50 mL  50 mL Q15 MIN PRN Barrington Esteban M.D.       • [MAR Hold] oxyCODONE  "immediate-release (ROXICODONE) tablet 5 mg  5 mg Q4HRS ABBI Ordoñez M.D.   5 mg at 07/14/20 2231    Or   • [MAR Hold] oxyCODONE immediate release (ROXICODONE) tablet 10 mg  10 mg Q4HRS ABBI Ordoñez M.D.   10 mg at 07/08/20 0335   • [MAR Hold] Pharmacy Consult Request ...Pain Management Review 1 Each  1 Each PHARMACY TO DOSE Eligio Culp M.D.       • [MAR Hold] gabapentin (NEURONTIN) capsule 300 mg  300 mg TID Zesu Yepez M.D.   300 mg at 07/15/20 0454   • [MAR Hold] tamsulosin (FLOMAX) capsule 0.4 mg  0.4 mg AFTER BREAKFAST Zeus Yepez M.D.   Stopped at 07/15/20 0830   • [MAR Hold] senna-docusate (PERICOLACE or SENOKOT S) 8.6-50 MG per tablet 2 Tab  2 Tab BID Zeus Yepez M.D.   2 Tab at 07/14/20 0500    And   • [MAR Hold] polyethylene glycol/lytes (MIRALAX) PACKET 1 Packet  1 Packet QDAY ABBI Yepez M.D.   1 Packet at 07/07/20 1127    And   • [MAR Hold] magnesium hydroxide (MILK OF MAGNESIA) suspension 30 mL  30 mL QDAY ABBI Yepez M.D.   30 mL at 07/10/20 0927    And   • [MAR Hold] bisacodyl (DULCOLAX) suppository 10 mg  10 mg QDAY ABBI Yepez M.D.       • [MAR Hold] ondansetron (ZOFRAN) syringe/vial injection 4 mg  4 mg Q4HRS ABBI Yepez M.D.   4 mg at 07/12/20 0316   • [MAR Hold] ondansetron (ZOFRAN ODT) dispertab 4 mg  4 mg Q4HRS ABBI Yepez M.D.       • [MAR Hold] acetaminophen (TYLENOL) tablet 650 mg  650 mg Q6HRS PRVARGHESE Londonedt, M.D.   650 mg at 07/14/20 2116   • [MAR Hold] fentaNYL (SUBLIMAZE) injection 25 mcg  25 mcg Q2HRS ABBI Yepez M.D.   25 mcg at 07/05/20 2115   Last reviewed on 7/15/2020 11:53 AM by Sophia Weber R.N.    No Known Allergies    Physical Exam  Body mass index is 22.69 kg/m². /77   Pulse 93   Temp 36.8 °C (98.2 °F) (Temporal)   Resp 18   Ht 1.727 m (5' 8\")   Wt 67.7 kg (149 lb 4 oz)   SpO2 99%    Vitals:    07/15/20 0347 07/15/20 0756 07/15/20 0800 07/15/20 1142   BP: " 126/67 132/82  127/77   Pulse: 95 (!) 103 98 93   Resp: 18 18 17 18   Temp: 36.2 °C (97.2 °F) 36.6 °C (97.9 °F)  36.8 °C (98.2 °F)   TempSrc:  Temporal  Temporal   SpO2: 98% 97% 96% 99%   Weight:       Height:       Oxygen Therapy:  Pulse Oximetry: 99 %, FiO2%: 28 %(2L)    General: no apparent distress  Eyes: nl conjunctiva  ENT: OP clear  Neck: no JVD   Lungs: normal respiratory effort, CTAB  Heart: RRR, no murmurs, no rubs or gallops,   EXT: right AKA, No edema bilateral lower extremities. 2+ pedal pulses in left limb. no cyanosis  Abdomen: soft, non tender, non distended  Neurological: No focal deficits  Psychiatric: Appropriate affect, A/O x 3  Skin: Warm extremities    Labs (personally reviewed and notable for):     Recent Results (from the past 24 hour(s))   ACCU-CHEK GLUCOSE    Collection Time: 07/14/20 12:19 PM   Result Value Ref Range    Glucose - Accu-Ck 138 (H) 65 - 99 mg/dL   COVID/SARS CoV-2 PCR    Collection Time: 07/14/20  4:21 PM    Specimen: Nasopharyngeal; Respirate   Result Value Ref Range    COVID Order Status Received    SARS-CoV-2, PCR (In-House)    Collection Time: 07/14/20  4:21 PM   Result Value Ref Range    SARS-CoV-2 Source Nasal Swab     SARS-CoV-2 (RdRp gene) NotDetected    ACCU-CHEK GLUCOSE    Collection Time: 07/14/20  6:15 PM   Result Value Ref Range    Glucose - Accu-Ck 122 (H) 65 - 99 mg/dL   ACCU-CHEK GLUCOSE    Collection Time: 07/14/20 11:30 PM   Result Value Ref Range    Glucose - Accu-Ck 118 (H) 65 - 99 mg/dL   CBC WITH DIFFERENTIAL    Collection Time: 07/15/20  2:15 AM   Result Value Ref Range    WBC 7.0 4.8 - 10.8 K/uL    RBC 2.19 (L) 4.70 - 6.10 M/uL    Hemoglobin 6.8 (L) 14.0 - 18.0 g/dL    Hematocrit 21.2 (L) 42.0 - 52.0 %    MCV 96.8 81.4 - 97.8 fL    MCH 31.1 27.0 - 33.0 pg    MCHC 32.1 (L) 33.7 - 35.3 g/dL    RDW 59.9 (H) 35.9 - 50.0 fL    Platelet Count 200 164 - 446 K/uL    MPV 11.0 9.0 - 12.9 fL    Neutrophils-Polys 67.70 44.00 - 72.00 %    Lymphocytes 15.30 (L)  22.00 - 41.00 %    Monocytes 11.40 0.00 - 13.40 %    Eosinophils 4.40 0.00 - 6.90 %    Basophils 0.30 0.00 - 1.80 %    Immature Granulocytes 0.90 0.00 - 0.90 %    Nucleated RBC 0.30 /100 WBC    Neutrophils (Absolute) 4.74 1.82 - 7.42 K/uL    Lymphs (Absolute) 1.07 1.00 - 4.80 K/uL    Monos (Absolute) 0.80 0.00 - 0.85 K/uL    Eos (Absolute) 0.31 0.00 - 0.51 K/uL    Baso (Absolute) 0.02 0.00 - 0.12 K/uL    Immature Granulocytes (abs) 0.06 0.00 - 0.11 K/uL    NRBC (Absolute) 0.02 K/uL   Comp Metabolic Panel    Collection Time: 07/15/20  2:15 AM   Result Value Ref Range    Sodium 141 135 - 145 mmol/L    Potassium 3.4 (L) 3.6 - 5.5 mmol/L    Chloride 110 96 - 112 mmol/L    Co2 21 20 - 33 mmol/L    Anion Gap 10.0 7.0 - 16.0    Glucose 118 (H) 65 - 99 mg/dL    Bun 27 (H) 8 - 22 mg/dL    Creatinine 1.63 (H) 0.50 - 1.40 mg/dL    Calcium 7.7 (L) 8.5 - 10.5 mg/dL    AST(SGOT) 17 12 - 45 U/L    ALT(SGPT) 23 2 - 50 U/L    Alkaline Phosphatase 66 30 - 99 U/L    Total Bilirubin 0.2 0.1 - 1.5 mg/dL    Albumin 2.4 (L) 3.2 - 4.9 g/dL    Total Protein 4.7 (L) 6.0 - 8.2 g/dL    Globulin 2.3 1.9 - 3.5 g/dL    A-G Ratio 1.0 g/dL   ESTIMATED GFR    Collection Time: 07/15/20  2:15 AM   Result Value Ref Range    GFR If  51 (A) >60 mL/min/1.73 m 2    GFR If Non  42 (A) >60 mL/min/1.73 m 2   PROCALCITONIN    Collection Time: 07/15/20  2:15 AM   Result Value Ref Range    Procalcitonin <0.05 <0.25 ng/mL   COD - Adult (Type and Screen)    Collection Time: 07/15/20  2:15 AM   Result Value Ref Range    ABO Grouping Only B     Rh Grouping Only POS     Antibody Screen-Cod NEG     Component R       R99                 Red Cells, LR       [id number]   released     07/15/20   10:40      Product Type R99     Dispense Status /Released     Unit Number (Barcoded) [id number]     Product Code (Barcoded) X1399V81     Blood Type (Barcoded) 1700    ACCU-CHEK GLUCOSE    Collection Time: 07/15/20  5:03 AM   Result  Value Ref Range    Glucose - Accu-Ck 88 65 - 99 mg/dL   HGB    Collection Time: 07/15/20  9:14 AM   Result Value Ref Range    Hemoglobin 8.9 (L) 14.0 - 18.0 g/dL       Cardiac Imaging and Procedures Review:      ECHO 07/13:  Left ventricular ejection fraction is visually estimated to be 25%.  Global hypokinesis.  Mild mitral regurgitation.  Moderate tricuspid regurgitation.  Estimated right ventricular systolic pressure  is 97 mmHg.    Impression and Medical Decision Making:  Principal Problem:    Critical lower limb ischemia POA: Yes  Active Problems:    Acute renal failure superimposed on stage 3 chronic kidney disease (HCC) POA: Yes    Non-STEMI (non-ST elevated myocardial infarction) ([facility]) POA: No    Acute on chronic combined systolic and diastolic heart failure ([facility]) POA: Yes    PAD (peripheral artery disease) ([facility]) POA: Yes    Dyslipidemia POA: Yes    Essential hypertension POA: Yes    S/P AKA (above knee amputation), right (HCC) POA: Yes    History of tobacco abuse POA: Yes    Ischemic cardiomyopathy POA: Yes    Hyponatremia POA: Yes    Hyperkalemia POA: Yes    Hypoglycemia POA: Unknown    Hyperglycemia POA: Unknown    Normocytic anemia POA: Unknown    GI bleed POA: Unknown  Resolved Problems:    * No resolved hospital problems. *      Assessment and plans:     1. NSTEMI type II precipitated by severe anemia and acute stress  Likely with multivessel CAD, DM    -S/p AKA, limited conduits for CABG  -CKD with recent angio for PAD and CT  -High risk for CARLITA but with severe LV systolic dysfunction  -Optimize med Rx. Up-titrate metoprolol (try reducing HR to 70-80)  -Would favor conservative for now until renal fiunction improved  -From cardiac standpoint  -heparin gtt can be stopped   -DAPT for now  -heparin gtt can be stopped   -Consider MPI to r/o high risk anatomy as outpatient.   -If patient will be on xarelto long term per vascular surgery, there is no need for ASA as per cardiac point of view. Continue  plavix alone with xarelto when there are no signs of active bleeding.      2. Ischemic CMP with acute HF    -New diagnosis of HFrEF with EF of 25 %,likley secondary to CAD (high risk for multi-vessel CAD)  -dyspnea with pulmonary edema on CXR  -Careful diuresis  -low salt, cardiac diet  -daily weight  -strict I's and O's  -lasix can be transitioned to oral  -continue BB, ACEI  -consider adding aldactone when BP allows    3. Anemia ?Acute blood loss    -Hb dropped down to 6.8 yesterday s/p PRBC transfusion; Hb trended up to 8.9   -GI consulted; EGD today  -On iron supplement  -continue PPI     4. CKD with aute worsening likely due to contrast  -Per primary     5. PAD s/p AKA and fem pop bypass with new stent  -Per vascular and primary     Will follow    Thank you for allowing me to participate in the care of this patient.  Please call or text via [employer] if clarification would be useful.

## 2020-07-15 NOTE — PROGRESS NOTES
Gastroenterology Progress Note     Author: Silver Silverman M.D.   Date & Time Created: 7/15/2020 10:02 AM    Chief Complaint:  GIB, Blacj stool    Interval History:    Mr. Antonio Walker is a 70 y.o. male with history of Rt AKA 2004,  severe PAD with prior femoral artery bypass occlusions, CAD, CKD stage 3, DM and chronic normocytic anemia ( baseline GB 9-10 ) who presented on 7/4/2020 with acute left leg pain x3 days,Vascualr surgery consulted patient found to have acute left leg ischemia secondary to Femoral-Popliteal bybass occlusion, Patient underwent bypass graft stent 7/5 with minimal blood loss and was started on Xarelto + DATP. His hospitalization course complicated with possible NSTEMI, Cardiology consulted with recs to switch to Heparin drip and manage medically as patient is not a candidate for intervention.      Patient HGB dropped to 6.3 following vascular intervention 7/9 with positive FOBT with 3 episodes of black non tarry stool therefore GI consulted.     7/15/2020: Patient vitally stable, HGB dropped to 6.8 today, patient received 1 unit PRBCs. Denies further episodes of black stool, no abdominal pain. No hematemesis. Patient for EGD today.     Review of Systems:  Review of Systems   Constitutional: Negative for chills and fever.   Respiratory: Negative for cough.    Cardiovascular: Negative for chest pain.   Genitourinary: Negative for dysuria.   Musculoskeletal: Negative for myalgias.   Neurological: Negative for dizziness.       Physical Exam:  Physical Exam   Constitutional: He appears well-developed.   Neck: Normal range of motion.   Cardiovascular: Normal rate.   Pulmonary/Chest: Effort normal.   Abdominal: Soft. He exhibits no distension. There is no abdominal tenderness.       Labs:          Recent Labs     07/13/20  0452 07/14/20  0110 07/15/20  0215   SODIUM 136 139 141   POTASSIUM 4.7 3.9 3.4*   CHLORIDE 112 110 110   CO2 13* 17* 21   BUN 36* 33* 27*   CREATININE 1.59*  1.62* 1.63*   CALCIUM 8.1* 7.8* 7.7*     Recent Labs     07/13/20  0452 07/14/20  0110 07/15/20  0215   ALTSGPT  --  17 23   ASTSGOT  --  16 17   ALKPHOSPHAT  --  71 66   TBILIRUBIN  --  0.2 0.2   GLUCOSE 109* 102* 118*     Recent Labs     07/12/20  2119 07/13/20  0452 07/14/20  0110 07/14/20  0605 07/15/20  0215 07/15/20  0914   RBC  --   --  2.31*  --  2.19*  --    HEMOGLOBIN  --   --  7.3*  --  6.8* 8.9*   HEMATOCRIT  --   --  22.2*  --  21.2*  --    PLATELETCT  --   --  199  --  200  --    APTT 64.9* 78.5*  --  56.2*  --   --      Recent Labs     07/14/20  0110 07/15/20  0215   WBC 8.7 7.0   NEUTSPOLYS 74.50* 67.70   LYMPHOCYTES 11.80* 15.30*   MONOCYTES 10.20 11.40   EOSINOPHILS 2.40 4.40   BASOPHILS 0.30 0.30   ASTSGOT 16 17   ALTSGPT 17 23   ALKPHOSPHAT 71 66   TBILIRUBIN 0.2 0.2       Imaging:  Imaging reviewed    Assessment and plan:     # Acute on to of chronic multifactorial normocytic anemia most likely secondary to upper GIB in the setting of recently started AC and DAPT  - IV PPI  - Transfuse prn to keep HGB > 7   - Patient is no longer on AC or DAPT in the setting of possible GIB.   - For EGD today with Anaesthesia, If EGD without obvious source of bleeding, will proceed with Colonoscopy day after tomorrow.      Thank your for the opportunity to assist in the care of your patient.  Please call for any questions or concerns.    Quality-Core Measures   Reviewed items::  Labs reviewed  Kinney catheter::  No Kinney  DVT prophylaxis - mechanical:  SCDs    Silver Silverman M.D.   PGY3-IM resident

## 2020-07-15 NOTE — OR NURSING
1254  RECEIVED PATIENT FROM OR.  REPORT FROM DR. ASENCIO.  NO AIRWAY IN PLACE.  RESPIRATIONS ARE EVEN AND UNLABORED.  ESPANA TO DD WITH CLEAR, YELLOW URINE NOTED.     3890  REPORT TO ROGERS GOULD.

## 2020-07-15 NOTE — CARE PLAN
Problem: Safety  Goal: Will remain free from falls  Outcome: PROGRESSING AS EXPECTED  Intervention: Implement fall precautions  Flowsheets  Taken 7/13/2020 1511  Bed Alarm: Yes - Alarm On  IV Pole on Same Side of Bed as Bathroom: Yes  Bedrails: Bedrails Closest to Bathroom Down  Chair/Bed Strip Alarm: Yes - Alarm On  Taken 7/15/2020 0730  Environmental Precautions:   Treaded Slipper Socks on Patient   Personal Belongings, Wastebasket, Call Bell etc. in Easy Reach   Transferred to Stronger Side   Report Given to Other Health Care Providers Regarding Fall Risk   Bed in Low Position   Communication Sign for Patients & Families   Mobility Assessed & Appropriate Sign Placed     Problem: Skin Integrity  Goal: Risk for impaired skin integrity will decrease  Outcome: PROGRESSING AS EXPECTED  Intervention: Implement precautions to protect skin integrity in collaboration with the interdisciplinary team  Flowsheets  Taken 7/15/2020 0730 by Cheryle Acosta R.N.  Skin Preventative Measures:   Pillows in Use for Support / Positioning   Pillows in Use to Float Heels   Gray Foam for Oxygen Tubing  Bed Types: Pressure Redistribution Mattress (Atmosair)  Friction Interventions: Draw Sheet / Pad Used for Repositioning  Patient Turns / Repositioning: Patient Turns Self from Side to Side  PT / OT Involved in Care:   Physical Therapy Involved   Occupational Therapy Involved  Moisturizers: Moisturizer   Patient is Receiving Nutrition: Oral Intake Adequate  Vitamin Therapy in Use: No  Activity: Bed  Assistance / Tolerance for Turning/Repositioning: Tolerates Well  Taken 7/13/2020 2131 by Josephine Pan, R.N.  Nutrition Consult Ordered: No, Consult has Already been Placed

## 2020-07-15 NOTE — ANESTHESIA QCDR
2019 [facility] Clinical Data Registry (for Quality Improvement)     Postoperative nausea/vomiting risk protocol (Adult = 18 yrs and Pediatric 3-17 yrs)- (430 and 463)  General inhalation anesthetic (NOT TIVA) with PONV risk factors: No  Provision of anti-emetic therapy with at least 2 different classes of agents: N/A  Patient DID NOT receive anti-emetic therapy and reason is documented in Medical Record: N/A    Multimodal Pain Management- (477)  Non-emergent surgery AND patient age >= 18: Yes  Use of Multimodal Pain Management, two or more drugs and/or interventions, NOT including systemic opioids: No  Exception: Documented allergy to multiple classes of analgesics: No       Smoking Abstinence (404)  Patient is current smoker (cigarette, pipe, e-cig, marijuanna): Yes  Elective Surgery: No  Abstinence instructions provided prior to day of surgery:   Patient abstained from smoking on day of surgery:     Pre-Op Beta-Blocker in Isolated CABG (44)  Isolated CABG AND patient age >= 18: No  Beta-blocker admin within 24 hours of surgical incision:   Exception:of medical reason(s) for not administering beta blocker within 24 hours prior to surgical incision (e.g., not  indicated,other medical reason):     PACU assessment of acute postoperative pain prior to Anesthesia Care End- Applies to Patients Age = 18- (ABG7)  Initial PACU pain score is which of the following: < 7/10  Patient unable to report pain score: N/A    Post-anesthetic transfer of care checklist/protocol to PACU/ICU- (426 and 427)  Upon conclusion of case, patient transferred to which of the following locations: PACU/Non-ICU  Use of transfer checklist/protocol: Yes  Exclusion: Service Performed in Patient Hospital Room (and thus did not require transfer): N/A  Unplanned admission to ICU related to anesthesia service up through end of PACU care- (MD51)  Unplanned admission to ICU (not initially anticipated at anesthesia start time): No

## 2020-07-15 NOTE — ANESTHESIA TIME REPORT
Anesthesia Start and Stop Event Times     Date Time Event    7/15/2020 1212 Ready for Procedure     1241 Anesthesia Start     1258 Anesthesia Stop        Responsible Staff  07/15/20    Name Role Begin End    Hugo Jimenez M.D. Anesth 1241 1258        Preop Diagnosis (Free Text):  Pre-op Diagnosis     Possible GIB on AC        Preop Diagnosis (Codes):    Post op Diagnosis  Duodenal ulcer      Premium Reason  Non-Premium    Comments:

## 2020-07-15 NOTE — OR NURSING
1340 Received report from Nadia GOULD, assumed caer of pt at this time. Pt table, transport arrived at bedside.     1345 Pt to floor with RN and transporter. Monitors on.

## 2020-07-15 NOTE — PROGRESS NOTES
Paged Dr. Noble regarding patients hemoglobin result of 8.9, per Dr. Noble  unit of blood transfusion not needed at this time. Spoke to Elaine in Blood Bank who stated to tube blood back to tube station 11.

## 2020-07-15 NOTE — CARE PLAN
Problem: Communication  Goal: The ability to communicate needs accurately and effectively will improve  Outcome: PROGRESSING AS EXPECTED    Pt whiteboard updated on plan of care  Pt encouraged to call for assistance  Pt encouraged to voice any concerns or questions regarding care plan  Pt updated on plan of care as it develops and changes      Problem: Safety  Goal: Will remain free from injury  Outcome: PROGRESSING AS EXPECTED  Goal: Will remain free from falls  Outcome: PROGRESSING AS EXPECTED   Treaded socks in use  Call light within reach and patient calls appropriately  Medication education provided prior to administration  Medications administered as ordered  Bed alarm on and bed locked in lowest position

## 2020-07-15 NOTE — ANESTHESIA PREPROCEDURE EVALUATION
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   (+) Critical lower limb ischemia   (+) Essential hypertension   (+) Non-STEMI (non-ST elevated myocardial infarction) (HCC)   (+) PAD (peripheral artery disease) (HCC)         (+) Acute renal failure superimposed on stage 3 chronic kidney disease (HCC)       Physical Exam    Airway   Mallampati: II  TM distance: >3 FB  Neck ROM: full       Cardiovascular - normal exam  Rhythm: regular  Rate: normal  (-) murmur     Dental   Comments: edentulous      Facial Hair   Pulmonary - normal exam  Breath sounds clear to auscultation     Abdominal    Neurological - normal exam               Anesthesia Plan    ASA 4   ASA physical status 4 criteria: MI - recent (< 3 months)    Plan - MAC             Induction: intravenous      Pertinent diagnostic labs and testing reviewed    Informed Consent:    Anesthetic plan and risks discussed with patient.

## 2020-07-15 NOTE — PROGRESS NOTES
Hospital Medicine Daily Progress Note    Date of Service  7/15/2020      Chief Complaint  70 y.o. male admitted 7/4/2020 with left lower extremity pain    Hospital Course    Mr. Antonio Walker is a 70 y.o. male with history of severe peripheral arterial disease with prior femoral artery bypass occlusions who presented on 7/4/2020 with acute left leg pain x3 days.  Pain starts in the mid thigh and goes all the way down to the distal feet and toes.  Also reports associated weakness and numbness.  Son confirms thrombosis vascular surgery Dr. Culp was consulted for critical limb ischemia.  Patient was started on a heparin drip and later TPA.  Status post lower extremity angiogram with stent placement in the left popliteal artery.  Patient was started on clopidogrel, aspirin, rivaroxaban.          Interval Problem Update  7/10 No acute issue overnight.  Vascular surgery stated that he can be discharged and follow-up with him as an outpatient.  Home health is ordered.    7/11 I updated him about his hemoglobin this morning.  Patient is yet to have bowel movement to have occult blood test.  Need to rule that out before he get discharge to SNF.    7/12: The patient was transferred to telemetry floor due to chest pain cardiology was consulted overnight and following.  His troponin is over 1600 and the result will be updated to cardiology.    7/13: Per cardiology the patient is not a candidate for any intervention.  Chest x-ray showed mild edema.  I started him on IV Lasix 20 mg twice daily.  I discussed the case with cardiology regarding medical management.  The patient is still constipated and will try to get stool sample for occult blood test.    7/14: I saw and examined the patient and updated him about his medical condition.  His echo showed LVEF of 25%.  Cardiology is following and recommending to continue IV heparin infusion until today.  In addition he was positive for occult blood stool and because of  that I consulted GI.  He is vasculopathic and he is on dual antiplatelet therapy and Xarelto for it.  I will wait for GI to see the patient and follow the recommendation.    7/15: Patient hemoglobin was 8.9 this morning.  Plan to have endoscopy today.    Review of Systems  Review of Systems   Constitutional: Positive for malaise/fatigue. Negative for chills and fever.   HENT: Negative for congestion and nosebleeds.    Eyes: Negative for photophobia and pain.   Respiratory: Positive for shortness of breath. Negative for sputum production and stridor.    Cardiovascular: Positive for chest pain. Negative for palpitations and orthopnea.   Gastrointestinal: Negative for abdominal pain, nausea and vomiting.   Genitourinary: Negative for frequency and urgency.   Musculoskeletal: Positive for myalgias. Negative for back pain and neck pain.   Skin: Negative for rash.   Neurological: Negative for focal weakness and seizures.   Psychiatric/Behavioral: The patient is not nervous/anxious.         Physical Exam  Temp:  [36.2 °C (97.2 °F)-37.3 °C (99.2 °F)] 36.2 °C (97.2 °F)  Pulse:  [] 95  Resp:  [17-20] 18  BP: (100-126)/(63-70) 126/67  SpO2:  [98 %-100 %] 98 %    Physical Exam  Vitals signs reviewed.   HENT:      Head: Normocephalic and atraumatic.      Nose: No congestion.   Eyes:      Extraocular Movements: Extraocular movements intact.      Pupils: Pupils are equal, round, and reactive to light.   Neck:      Musculoskeletal: Normal range of motion and neck supple.   Cardiovascular:      Rate and Rhythm: Normal rate.      Pulses: Normal pulses.      Heart sounds: Normal heart sounds.   Pulmonary:      Effort: Pulmonary effort is normal.      Breath sounds: Normal breath sounds.   Abdominal:      General: Bowel sounds are normal. There is no distension.      Palpations: Abdomen is soft.   Musculoskeletal:         General: No swelling or tenderness.   Skin:     General: Skin is warm.   Neurological:      Mental Status: He  is alert.         Fluids    Intake/Output Summary (Last 24 hours) at 7/15/2020 0748  Last data filed at 7/14/2020 2000  Gross per 24 hour   Intake 720 ml   Output 900 ml   Net -180 ml       Laboratory  Recent Labs     07/14/20  0110 07/15/20  0215   WBC 8.7 7.0   RBC 2.31* 2.19*   HEMOGLOBIN 7.3* 6.8*   HEMATOCRIT 22.2* 21.2*   MCV 96.1 96.8   MCH 31.6 31.1   MCHC 32.9* 32.1*   RDW 59.7* 59.9*   PLATELETCT 199 200   MPV 10.6 11.0     Recent Labs     07/13/20  0452 07/14/20  0110 07/15/20  0215   SODIUM 136 139 141   POTASSIUM 4.7 3.9 3.4*   CHLORIDE 112 110 110   CO2 13* 17* 21   GLUCOSE 109* 102* 118*   BUN 36* 33* 27*   CREATININE 1.59* 1.62* 1.63*   CALCIUM 8.1* 7.8* 7.7*     Recent Labs     07/12/20 2119 07/13/20  0452 07/14/20  0605   APTT 64.9* 78.5* 56.2*               Imaging  EC-ECHOCARDIOGRAM COMPLETE W/O CONT   Final Result      DX-CHEST-PORTABLE (1 VIEW)   Final Result      Bilateral pulmonary opacities consistent with pulmonary edema and/or multifocal pneumonia, along with bilateral pleural effusions.      CT-CTA CHEST PULMONARY ARTERY W/ RECONS   Final Result      1.  No evidence of pulmonary emboli.   2.  Scattered areas of discoid atelectasis and interstitial opacities in both lungs, along with bilateral pleural effusions, right greater than left.   3.  Ascites.            US-RENAL   Final Result      Unremarkable renal ultrasound.      US-EXTREMITY ARTERY LOWER UNILAT LEFT   Final Result      IR-EXTREMITY ANGIOGRAM-UNILATERAL LEFT    (Results Pending)   IR-ANGIO THROUGH EXISTING CATHETER    (Results Pending)        Assessment/Plan  * Critical lower limb ischemia- (present on admission)  Assessment & Plan  -main diagnosis, likely acute occlusion of the L femoral bypass  Status post angiogram and angioplasty on 7/6/2020.  Holding aspirin, clopidogrel, rivaroxaba Due to GI bleed  Vascular surgery is following, Dr. Culp and recommended to follow-up as an outpatient    Non-STEMI (non-ST elevated  myocardial infarction) ([facility])  Assessment & Plan  Per cardiology the patient is not a candidate for intervention and recommended medical management  On heparin infusion, need cardiology recommendation regarding continuation of anticoagulation  Echocardiogram pending    Acute renal failure superimposed on stage 3 chronic kidney disease ([facility])- (present on admission)  Assessment & Plan  -2/2 dehydration?  Improved  -baseline CKD stage III around 1.5-1.8      Ischemic cardiomyopathy- (present on admission)  Assessment & Plan  LVEF 25%  RVSP 97 mmHg  Started on IV Lasix 40 mg twice daily, losartan and metoprolol  Adjust medication as needed  Cardiology following    History of tobacco abuse- (present on admission)  Assessment & Plan  -counseled for more than 4 minutes on tobacco cessation [id number]   -I have ordered nicotine replacement therapy  Counseled extensively on smoking and peripheral vascular disease    S/P AKA (above knee amputation), right ([facility])- (present on admission)  Assessment & Plan  -appears well healed    Essential hypertension- (present on admission)  Assessment & Plan  Continue metoprolol.  Started on losartan.  And Lasix    Dyslipidemia- (present on admission)  Assessment & Plan  Start atorvastatin    PAD (peripheral artery disease) ([facility])- (present on admission)  Assessment & Plan  -severe, US pending  -appears to have re-occlusion of femoral bypass on the LLE  -critical limb ischemia, vascular surgery consulted, Dr. Culp  -IV fentanyl and Oral oxy IR PRN pain  -monitor blood pressure  Status post angiogram and angioplasty with stent placement 7/6.  Continue aspirin, clopidogrel, rivaroxaban(holding due to heparin infusion)    GI bleed  Assessment & Plan  Hemoglobin 8.9 today  Transfuse as needed with target hemoglobin above 7  Currently holding aspirin, Plavix and Xarelto  Follow CBC closely today  Plan to have EGD today    Normocytic anemia  Assessment & Plan  The patient received 1 unit of blood  transfusion yesterday  FOBT is pending  If positive then will need to consult GI      Hyperglycemia  Assessment & Plan  Monitor blood sugars as above.    Hypoglycemia  Assessment & Plan    Results from last 7 days   Lab Units 07/07/20  1116 07/07/20  0540 07/07/20  0012 07/06/20  1912 07/06/20  1512 07/05/20  2211   ACCU CHECK GLUCOSE 788 mg/dL 96 86 77 89 85 61*     I have ordered insulin sliding scale with D50 and glucagon for hypoglycemia per protocol.  Encourage p.o. intake  Diabetic education      Hyperkalemia- (present on admission)  Assessment & Plan  -mild, 2/2 Acute on CKD Stage III  -Resolved    Hyponatremia- (present on admission)  Assessment & Plan  -2/2 hypovolemia presumed  Resolved.  Hold outpatient HCTZ  -start IVF's with NS and correct slowly, daily NA+       VTE prophylaxis: heparin

## 2020-07-15 NOTE — PROGRESS NOTES
12 hr chart check    Monitor Summary:    . 16/. 08/. 36  Rhythm:  SR-ST   Ectopy: R PVC RPAC      Patient alert and oriented through the night. NPO since 0000 for EGD. VSS. Pain tx through the night with PRN meds. (See MAR)

## 2020-07-15 NOTE — PROGRESS NOTES
I called and updated his sister about medical condition and answered all the questions she had.

## 2020-07-16 NOTE — PROGRESS NOTES
Assumed care of patient, bedside report received from LUIS FERNANDO Law. Updated on POC, call light within reach and fall precautions in place. Bed locked and in lowest position. Patient instructed to call for assistance before getting out of bed. All questions answered, no other needs at this time.

## 2020-07-16 NOTE — THERAPY
"Occupational Therapy  Daily Treatment     Patient Name: Antonio Walker  Age:  70 y.o., Sex:  male  Medical Record #: [id number]  Today's Date: 7/16/2020     Precautions  Precautions: (P) Fall Risk, Weight Bearing As Tolerated Left Lower Extremity  Comments: (P) chronic R AKA    Assessment    Pt making functional gains towards acute OT goals. Pt primarily limited by generalized weakness and activity tolerance. Will continue to follow.     Plan    Continue current treatment plan.    Discharge recommendations:  Recommend home health for continued occupational therapy services.     Subjective    \"I guess you caught me at the right time\"     Objective       07/16/20 0905   Vitals   O2 (LPM) 0   O2 Delivery Device None - Room Air   Pain 0 - 10 Group   Therapist Pain Assessment Post Activity Pain Same as Prior to Activity;Nurse Notified  (no c/o pain during session )   Cognition    Cognition / Consciousness X   Speech/ Communication Delayed Responses   Level of Consciousness Alert   Safety Awareness Impaired   Comments pleasent and cooperative   Balance   Sitting Balance (Static) Fair +   Sitting Balance (Dynamic) Fair   Standing Balance (Static) Fair -   Weight Shift Sitting Fair   Weight Shift Standing Absent   Skilled Intervention Verbal Cuing;Sequencing   Comments w/ txfs    Bed Mobility    Supine to Sit Supervised   Sit to Supine Supervised   Scooting Supervised   Activities of Daily Living   Grooming Supervision;Seated   Lower Body Dressing Minimal Assist  (sock)   Skilled Intervention Verbal Cuing   Functional Mobility   Sit to Stand   (CGA)   Bed, Chair, Wheelchair Transfer Minimal Assist  (for safety)   Transfer Method Squat Pivot   Mobility EOB > w/c    Activity Tolerance   Sitting in Chair 10+ min (up post)   Sitting Edge of Bed 5 min    Standing 10 sec   Patient / Family Goals   Patient / Family Goal #1 to return home   Goal #1 Outcome Goal not met   Short Term Goals   Short Term Goal # 1 supervised with " LB dressing   Goal Outcome # 1 Progressing as expected

## 2020-07-16 NOTE — PROGRESS NOTES
Vascular    Events noted: EGD yesterday due to GIB  Currently off all antithrombotics including no aspirin    Currently alert and conversant, no specific complaints    Excellent reperfusion of the left foot via the AT  PT/OT    Discharge once cleared by medicine, may need SNF versus home with HH  Outpatient follow-up with me in 2-4 weeks for ongoing vascular care.  Will continue following while he is here  Very much appreciate the hospitalist service's support managing Mr. Walker    Eligio Culp MD  [facility] (General and Vascular Surgery)  Cell: [phone] (text or call is fine, if you don't reach me please try my office)  Office: [phone]  __________________________________________________________________  Patient:Antonio Walker   MRN:[id number]   CSN:[id number]

## 2020-07-16 NOTE — HEART FAILURE PROGRAM
Patient presented on 7/4/20 with c/o LLE pain x 3 days. On 7/6/20 Dr. Culp performed LLE angio with stent placement to left popliteal arteryand then post stent angioplasty. Also plain balloon angio of left profunda femoris artery.    It looks like patient was set to discharge with home health on 7/10 when his hemoglobin trended down 6.7 it was also noted that patient was constipated still.    The next day nursing note indicates that patient was found to be sauturating at 83%, he was placed on 2L, then increased to 4L Hospitalist and Respiratory to bedside. IS and 6L oxymask initiated.    Early into the next morning, patient began to complain of CP, labs and EKG and CP resolved, Dr. Perkins ruled out STEMI. Patient transferred to Telemetry. Troponin came back that morning [id number]. Started on heparin gtt and full dose ASA ordered, formal cardiology consult requested as well as echo ordered.     It was determined that patient had nSTEMI likely demand ischemia with hemoglobin level of 8.5. Dr. Gaitan noted that as a vasculopath, pt is not a candidate for cardiac catheterization.    On 7/13, Attending note indicates persistent constipation so difficult to obtain occult stool. This same day, echocardiogram comes back showing EF of 25% with global hypokinesis, moderate MR, and RVSP at 97 mmHg. Pt known DM and CKD acute worsening likely due to contrast. On this same day, hospital medicine responded to a documentation query regarding the nSTEMI diagnosis selecting nSTEMI instead of Type II. I've reached out to Dr. Noble who is still following the patient about this because cardiology are diagnosing TYPE II nSTEMI precipitated by severe anemia and acute stress and likely with MV CAD.     On 7/15 patient was found to have duodenal ulcer GI recommended resumption of AC and continuation of PPI and not giving AC on empty stomach.    Please see below for measures that must be addressed prior to discharge.     Daily Nurse: please  begin to fill out the HF checklist (pink sheet in hard chart) and use it to guide your daily care.    Discharge Nurse: please ensure completeness of the HF checklist (pink sheet in hard chart) and have it co-signed by the charge RN before the patient leaves the hospital.    Thank you, Ivette, Cardiovascular Nurse Navigator, RN, [facility] [phone]    HF Measures:  1. Documentation of LV systolic function (echo or cath) PTA, during this hospitalization, or plan to assess post discharge or reason for not assessing documented  2. Documentation of fluid intake and urine output every nursing shift  3. 2 hour post diuretic assessment documented 2 hours after diuretic given  4. HF Patient Education using the Living Well With Heart Failure Booklet and Symptom Tracker documented every nursing shift  5. Nutrition consult for diet education  6. Daily weights (one weight documented every 24 hours) on a standing scale unless standing is contraindicated in which case bed scale can be used - have patient write weight on symptom tracker  7. For LVEF less than or equal to 40%, ACE-I, ARNI or ARB prescribed at discharge   8. For LVEF less than or equal to 40%, an Evidence Based Beta Blocker (bisoprolol, carvedilol, toprol xl) must be prescribed at discharge  9. For LVEF less than or equal to 35% aldosterone blockade prescribed at discharge  10. The combination of hydralazine and isosorbide dinitrate is recommended to reduce morbidity and mortality for patients self-described  Americans with NYHA class III-IV HFrEF (EF 40% or less), receiving optimal therapy with ACE inhibitors and beta blockers, unless contraindicated (Class I, CAITIE: A).  11. If a HF patient is diabetic or is newly diagnosed with DM: prescribed diabetes treatment at discharge in the form of glycemic control (diet or anti-hyperglycemic medication) or f/u appointment for diabetes management scheduled at discharge.  12. If a HF patient has diabetes: prescribed lipid  lowering medication at discharge  13. Documented smoking cessation advice or counseling  14. If a HF patient has a-fib: anticoagulation is prescribed upon discharge or contraindication is documented  15. Screening for and administering immunizations as long as no contraindications: Pneumonia (regardless of age) and Influenza  16. Written discharge instructions include:  ? Daily weights  ? Record weight on tracker  ? Bring tracker to appointments  ? Call MD for weight gain of 3lb /day or 5lb/week  ? HF medication teaching  ? Low sodium diet  ? Follow up appointment within seven calendar days of d/c must include: date, time and location  ? Activity  ? Worsening symptoms    What if any of the above HF measures are contraindicated?  ? Request that the discharging provider document the medication/intervention and the contraindication specifically in a progress note  ? For example: “no CHF meds due to hypotension” is not enough. It needs to say: “No ACE-I, ARNI, ARB due to hypotension”; “No Beta Blockade due to bradycardia”…

## 2020-07-16 NOTE — PROGRESS NOTES
Monitor Summary:    SR 74-90 with frequent PVCs and PACs and with frequent 1.1-1.6 sec pauses.     .20/.08/.38

## 2020-07-16 NOTE — ANESTHESIA POSTPROCEDURE EVALUATION
Patient: Antonio Walker    Procedure Summary     Date:  07/15/20 Room / Location:  [facility] ROOM 26 / SURGERY SAME DAY [facility]    Anesthesia Start:  1241 Anesthesia Stop:  1258    Procedure:  GASTROSCOPY (N/A Esophagus) Diagnosis:  (duodenal ulcer )    Surgeon:  Royce Garcia M.D. Responsible Provider:  Hugo Jimenez M.D.    Anesthesia Type:  MAC ASA Status:  4          Final Anesthesia Type: MAC  Last vitals  BP   Blood Pressure : 119/70    Temp   36.1 °C (97 °F)    Pulse   Pulse: 100   Resp   16    SpO2   94 %      Anesthesia Post Evaluation    Patient location during evaluation: PACU  Patient participation: complete - patient participated  Level of consciousness: awake and alert  Pain score: 0    Airway patency: patent  Anesthetic complications: no  Cardiovascular status: hemodynamically stable  Respiratory status: acceptable  Hydration status: euvolemic    PONV: none           Nurse Pain Score: 0 (NPRS)

## 2020-07-16 NOTE — PROGRESS NOTES
Assumed care of pt @ 1915, bedside report received from LUIS FERNANDO Lobato.  Pt A&OX4 and denies any pain. Bed locked and lowered with call light within reach and questions answered during present time.

## 2020-07-16 NOTE — PROGRESS NOTES
Hospital Medicine Daily Progress Note    Date of Service  7/16/2020      Chief Complaint  70 y.o. male admitted 7/4/2020 with left lower extremity pain    Hospital Course    Mr. Antonio Walker is a 70 y.o. male with history of severe peripheral arterial disease with prior femoral artery bypass occlusions who presented on 7/4/2020 with acute left leg pain x3 days.  Pain starts in the mid thigh and goes all the way down to the distal feet and toes.  Also reports associated weakness and numbness.  Son confirms thrombosis vascular surgery Dr. Culp was consulted for critical limb ischemia.  Patient was started on a heparin drip and later TPA.  Status post lower extremity angiogram with stent placement in the left popliteal artery.  Patient was started on clopidogrel, aspirin, rivaroxaban.          Interval Problem Update  7/10 No acute issue overnight.  Vascular surgery stated that he can be discharged and follow-up with him as an outpatient.  Home health is ordered.    7/11 I updated him about his hemoglobin this morning.  Patient is yet to have bowel movement to have occult blood test.  Need to rule that out before he get discharge to SNF.    7/12: The patient was transferred to telemetry floor due to chest pain cardiology was consulted overnight and following.  His troponin is over 1600 and the result will be updated to cardiology.    7/13: Per cardiology the patient is not a candidate for any intervention.  Chest x-ray showed mild edema.  I started him on IV Lasix 20 mg twice daily.  I discussed the case with cardiology regarding medical management.  The patient is still constipated and will try to get stool sample for occult blood test.    7/14: I saw and examined the patient and updated him about his medical condition.  His echo showed LVEF of 25%.  Cardiology is following and recommending to continue IV heparin infusion until today.  In addition he was positive for occult blood stool and because of  that I consulted GI.  He is vasculopathic and he is on dual antiplatelet therapy and Xarelto for it.  I will wait for GI to see the patient and follow the recommendation.    7/15: Patient hemoglobin was 8.9 this morning.  Plan to have endoscopy today.    7/16 the patient had EGD done yesterday but did not notice GI report yet.  He stated that he does not want to proceed with colonoscopy and wanted to follow-up as an outpatient for that.    Review of Systems  Review of Systems   Constitutional: Positive for malaise/fatigue. Negative for chills.   HENT: Negative for congestion and nosebleeds.    Eyes: Negative for photophobia.   Respiratory: Positive for shortness of breath. Negative for sputum production and stridor.    Cardiovascular: Positive for chest pain. Negative for palpitations and orthopnea.   Gastrointestinal: Negative for nausea and vomiting.   Genitourinary: Negative for frequency.   Musculoskeletal: Positive for myalgias. Negative for back pain and neck pain.   Skin: Negative for rash.   Neurological: Negative for focal weakness.   Psychiatric/Behavioral: The patient is not nervous/anxious.         Physical Exam  Temp:  [36.2 °C (97.1 °F)-36.8 °C (98.2 °F)] 36.2 °C (97.2 °F)  Pulse:  [] 100  Resp:  [13-19] 18  BP: ()/(54-84) 135/65  SpO2:  [91 %-100 %] 97 %    Physical Exam  Vitals signs reviewed.   HENT:      Head: Normocephalic.      Nose: No congestion.   Eyes:      Extraocular Movements: Extraocular movements intact.      Pupils: Pupils are equal, round, and reactive to light.   Neck:      Musculoskeletal: Normal range of motion and neck supple.   Cardiovascular:      Rate and Rhythm: Normal rate and regular rhythm.      Pulses: Normal pulses.      Heart sounds: Normal heart sounds.   Pulmonary:      Effort: Pulmonary effort is normal.      Breath sounds: Normal breath sounds.   Abdominal:      General: Abdomen is flat. Bowel sounds are normal. There is no distension.      Palpations:  Abdomen is soft.   Musculoskeletal:         General: No swelling or tenderness.   Skin:     General: Skin is warm and dry.   Neurological:      Mental Status: He is alert.         Fluids    Intake/Output Summary (Last 24 hours) at 7/16/2020 0739  Last data filed at 7/16/2020 0352  Gross per 24 hour   Intake 1306.54 ml   Output 4900 ml   Net -3593.46 ml       Laboratory  Recent Labs     07/14/20  0110 07/15/20  0215 07/15/20  0914   WBC 8.7 7.0  --    RBC 2.31* 2.19*  --    HEMOGLOBIN 7.3* 6.8* 8.9*   HEMATOCRIT 22.2* 21.2*  --    MCV 96.1 96.8  --    MCH 31.6 31.1  --    MCHC 32.9* 32.1*  --    RDW 59.7* 59.9*  --    PLATELETCT 199 200  --    MPV 10.6 11.0  --      Recent Labs     07/14/20  0110 07/15/20  0215   SODIUM 139 141   POTASSIUM 3.9 3.4*   CHLORIDE 110 110   CO2 17* 21   GLUCOSE 102* 118*   BUN 33* 27*   CREATININE 1.62* 1.63*   CALCIUM 7.8* 7.7*     Recent Labs     07/14/20  0605   APTT 56.2*               Imaging  EC-ECHOCARDIOGRAM COMPLETE W/O CONT   Final Result      DX-CHEST-PORTABLE (1 VIEW)   Final Result      Bilateral pulmonary opacities consistent with pulmonary edema and/or multifocal pneumonia, along with bilateral pleural effusions.      CT-CTA CHEST PULMONARY ARTERY W/ RECONS   Final Result      1.  No evidence of pulmonary emboli.   2.  Scattered areas of discoid atelectasis and interstitial opacities in both lungs, along with bilateral pleural effusions, right greater than left.   3.  Ascites.            US-RENAL   Final Result      Unremarkable renal ultrasound.      US-EXTREMITY ARTERY LOWER UNILAT LEFT   Final Result      IR-EXTREMITY ANGIOGRAM-UNILATERAL LEFT    (Results Pending)   IR-ANGIO THROUGH EXISTING CATHETER    (Results Pending)        Assessment/Plan  * Critical lower limb ischemia- (present on admission)  Assessment & Plan  -main diagnosis, likely acute occlusion of the L femoral bypass  Status post angiogram and angioplasty on 7/6/2020.  Holding aspirin, clopidogrel,  rivaroxaba Due to GI bleed  Vascular surgery is following, Dr. Culp and recommended to follow-up as an outpatient    Non-STEMI (non-ST elevated myocardial infarction) ([facility])  Assessment & Plan  Per cardiology the patient is not a candidate for intervention and recommended medical management  On heparin infusion, need cardiology recommendation regarding continuation of anticoagulation  Echocardiogram pending    Acute renal failure superimposed on stage 3 chronic kidney disease (HCC)- (present on admission)  Assessment & Plan  -2/2 dehydration?  Improved  -baseline CKD stage III around 1.5-1.8      Ischemic cardiomyopathy- (present on admission)  Assessment & Plan  LVEF 25%  RVSP 97 mmHg  Started on IV Lasix 40 mg twice daily, losartan and metoprolol  Adjust medication as needed  Cardiology following    History of tobacco abuse- (present on admission)  Assessment & Plan  -counseled for more than 4 minutes on tobacco cessation [id number]   -I have ordered nicotine replacement therapy  Counseled extensively on smoking and peripheral vascular disease    S/P AKA (above knee amputation), right ([facility])- (present on admission)  Assessment & Plan  -appears well healed    Essential hypertension- (present on admission)  Assessment & Plan  Continue metoprolol.  Started on losartan.  And Lasix    Dyslipidemia- (present on admission)  Assessment & Plan  Start atorvastatin    PAD (peripheral artery disease) ([facility])- (present on admission)  Assessment & Plan  -severe, US pending  -appears to have re-occlusion of femoral bypass on the LLE  -critical limb ischemia, vascular surgery consulted, Dr. Culp  -IV fentanyl and Oral oxy IR PRN pain  -monitor blood pressure  Status post angiogram and angioplasty with stent placement 7/6.  Continue aspirin, clopidogrel, rivaroxaban(holding due to heparin infusion)    GI bleed  Assessment & Plan  Hemoglobin 8.9 today  Transfuse as needed with target hemoglobin above 7  Currently holding aspirin,  Plavix and Xarelto  Follow CBC closely today  Plan to have EGD today    Normocytic anemia  Assessment & Plan  The patient received 1 unit of blood transfusion yesterday  FOBT is pending  If positive then will need to consult GI      Hyperglycemia  Assessment & Plan  Monitor blood sugars as above.    Hypoglycemia  Assessment & Plan    Results from last 7 days   Lab Units 07/07/20  1116 07/07/20  0540 07/07/20  0012 07/06/20  1912 07/06/20  1512 07/05/20  2211   ACCU CHECK GLUCOSE 788 mg/dL 96 86 77 89 85 61*     I have ordered insulin sliding scale with D50 and glucagon for hypoglycemia per protocol.  Encourage p.o. intake  Diabetic education      Hyperkalemia- (present on admission)  Assessment & Plan  -mild, 2/2 Acute on CKD Stage III  -Resolved    Hyponatremia- (present on admission)  Assessment & Plan  -2/2 hypovolemia presumed  Resolved.  Hold outpatient HCTZ  -start IVF's with NS and correct slowly, daily NA+       VTE prophylaxis: heparin

## 2020-07-16 NOTE — PROGRESS NOTES
Gastroenterology Progress Note     Author: Silver Silverman M.D.   Date & Time Created: 7/16/2020 1:04 PM    Chief Complaint:    GIB, Black stool      Interval History:    Mr. Antonio Walker is a 70 y.o. male with history of Rt AKA 2004,  severe PAD with prior femoral artery bypass occlusions, CAD, CKD stage 3, DM and chronic normocytic anemia ( baseline GB 9-10 ) who presented on 7/4/2020 with acute left leg pain x3 days,Vascualr surgery consulted patient found to have acute left leg ischemia secondary to Femoral-Popliteal bybass occlusion, Patient underwent bypass graft stent 7/5 with minimal blood loss and was started on Xarelto + DATP. His hospitalization course complicated with possible NSTEMI, Cardiology consulted with recs to switch to Heparin drip and manage medically as patient is not a candidate for intervention.      Patient HGB dropped to 6.3 following vascular intervention 7/9 with positive FOBT with 3 episodes of black non tarry stool therefore GI consulted.      7/15/2020: Patient vitally stable, HGB dropped to 6.8 today, patient received 1 unit PRBCs. Denies further episodes of black stool, no abdominal pain. No hematemesis. EGD shows Clean-based duodenal ulcer at the duodenal bulb, Atrophic antrum, s/p biopsy     7/16/2020: Stable HGB, Patient denies bloody BM. Tolerating GI soft diet.     Review of Systems:  Review of Systems   Constitutional: Negative for chills.   Respiratory: Negative for cough.    Cardiovascular: Negative for chest pain and palpitations.   Gastrointestinal: Negative for abdominal pain, nausea and vomiting.   Genitourinary: Negative for dysuria.   Neurological: Negative for dizziness.       Physical Exam:  Constitutional: He appears well-developed.   Neck: Normal range of motion.   Cardiovascular: Normal rate.   Pulmonary/Chest: Effort normal.   Abdominal: Soft. He exhibits no distension. There is no abdominal tenderness.     Labs:          Recent Labs      07/14/20  0110 07/15/20  0215 07/16/20  1021   SODIUM 139 141 135   POTASSIUM 3.9 3.4* 3.4*   CHLORIDE 110 110 100   CO2 17* 21 24   BUN 33* 27* 18   CREATININE 1.62* 1.63* 1.57*   CALCIUM 7.8* 7.7* 7.9*     Recent Labs     07/14/20  0110 07/15/20  0215 07/16/20  1021   ALTSGPT 17 23  --    ASTSGOT 16 17  --    ALKPHOSPHAT 71 66  --    TBILIRUBIN 0.2 0.2  --    GLUCOSE 102* 118* 165*     Recent Labs     07/14/20  0110 07/14/20  0605 07/15/20  0215 07/15/20  0914 07/16/20  1021   RBC 2.31*  --  2.19*  --  2.76*   HEMOGLOBIN 7.3*  --  6.8* 8.9* 8.6*   HEMATOCRIT 22.2*  --  21.2*  --  27.2*   PLATELETCT 199  --  200  --  281   APTT  --  56.2*  --   --   --      Recent Labs     07/14/20  0110 07/15/20  0215 07/16/20  1021   WBC 8.7 7.0 9.4   NEUTSPOLYS 74.50* 67.70  --    LYMPHOCYTES 11.80* 15.30*  --    MONOCYTES 10.20 11.40  --    EOSINOPHILS 2.40 4.40  --    BASOPHILS 0.30 0.30  --    ASTSGOT 16 17  --    ALTSGPT 17 23  --    ALKPHOSPHAT 71 66  --    TBILIRUBIN 0.2 0.2  --        Imaging:  Imaging reviewed     Assessment and plan:     # Acute on to of chronic multifactorial normocytic anemia most likely secondary to upper GIB in the setting of recently started AC and DAPT, S/P EGD 7/15/2020 shows Clean-based duodenal ulcer at the duodenal bulb, Atrophic antrum, s/p biopsy     - Oral PPI BID for 30 days   - GI soft diet   - Ok to resume AC  - Please ensure that any anticoagulants, especially aspirin or NSAIDs are not given on an empty stomach.    - Follow up with GI [facility] as outpatient in 3 weeks     Thank your for the opportunity to assist in the care of your patient.  GI will sign off, please re-consult if in need     Quality-Core Measures   Reviewed items::  Labs reviewed  Kinney catheter::  No Kinney  DVT prophylaxis - mechanical:  SCDs    Silver Silverman M.D.   PHY3-IM resident   PGY3-IM resident

## 2020-07-16 NOTE — CARE PLAN
Problem: Nutritional:  Goal: Achieve adequate nutritional intake  Description: Patient will consume 50% of meals  Outcome: MET     PO intake improved, generally >75%. Weight trend up, no pressure injuries noted. RD to follow weekly.

## 2020-07-16 NOTE — PROCEDURES
DATE OF SERVICE:  07/15/2020    GASTROENTEROLOGY PROCEDURE NOTE    PHYSICIAN:  Royce Garcia MD.    ANESTHESIOLOGIST:  Present.    MEDICATION:  Deep sedation.    PREPROCEDURE DIAGNOSIS:  Gastrointestinal bleed.    POSTPROCEDURE DIAGNOSES:  1.  Clean-based duodenal ulcer at the duodenal bulb.  2.  Atrophic antrum, status post biopsy.    CONSENT:  Procedure risks and benefits reviewed thoroughly with the patient,   risks including but not limited to bleeding, perforation, side effects of   medication were informed.  The patient voiced understanding and agreed to   proceed.    DESCRIPTION OF PROCEDURE:  The patient was placed in a left lateral decubitus   position.  After sedation was achieved, a bite block was inserted in the mouth   and a forward viewing gastroscope was passed carefully and easily under   direct visualization into the esophagus.  All esophageal views were normal as   were forward and retroflex views of the stomach.  The antrum was slightly   atrophic and after the duodenal views, please see below for findings.  The   antrum was biopsied to rule out Helicobacter pylori.  The scope was advanced   to the pyloric valve into the duodenal bulb; immediately at the base of the   duodenal bulb, there was a 1 cm clean-based ulcer without stigmata of   high-risk bleed.  The second and third portion of the duodenum were otherwise   normal.  The scope was retracted and removed.  The stomach was suctioned,   desufflated, and scope was removed.    COMPLICATIONS:  None.    BLOOD LOSS:  None.    SPECIMENS:  Obtained.    RECOMMENDATIONS:  The patient's gastrointestinal bleed, secondary to duodenal   ulcer, secondary to medication etiology.  The patient's ulcer currently is low   risk and the patient may be resumed on anticoagulation.  Please ensure that   any anticoagulants, especially aspirin or NSAIDs are not given on an empty   stomach.  The patient may resume anticoagulation tomorrow.  Please continue   proton pump  inhibitor therapy in a b.i.d. fashion and continue this as an   outpatient for 30 days.  The patient may be started on a solid diet as of   today.  Orders were present in the chart.       ____________________________________     Royce MD W[facility] / JESSICA    DD:  07/15/2020 14:27:48  DT:  07/15/2020 17:39:14    D#:  [id number]  Job#:  [id number]

## 2020-07-16 NOTE — CARE PLAN
Problem: Communication  Goal: The ability to communicate needs accurately and effectively will improve  Outcome: PROGRESSING AS EXPECTED  Intervention: [place] patient and significant other/support system to call light to alert staff of needs  Note: Complete   Intervention: Reorient patient to environment as needed  Note: Complete      Problem: Safety  Goal: Will remain free from falls  Outcome: PROGRESSING AS EXPECTED  Intervention: Assess risk factors for falls  Note: complete  Intervention: Implement fall precautions  Note: Bed alarm on, pt educated, call light within reach, bed in the lowest position

## 2020-07-17 NOTE — PROGRESS NOTES
Report received from LUIS FERNANDO Suarez. Patient is resting with no complaints of pain. Bed is locked in lowest position with call light within reach. POC discussed with patient and white board updated. Orders reviewed. RN will continue to monitor.

## 2020-07-17 NOTE — DISCHARGE SUMMARY
Discharge Summary    CHIEF COMPLAINT ON ADMISSION  Chief Complaint   Patient presents with   • Leg Pain     left leg       Reason for Admission  EMS     Admission Date  7/4/2020    CODE STATUS  Full Code    HPI & HOSPITAL COURSE  Mr. Antonio Walker is a 70 y.o. male with history of severe peripheral arterial disease with prior femoral artery bypass occlusions who presented on 7/4/2020 with acute left leg pain x3 days.  Pain starts in the mid thigh and goes all the way down to the distal feet and toes.  Also reports associated weakness and numbness.  Son confirms thrombosis vascular surgery Dr. Culp was consulted for critical limb ischemia.  Patient was started on a heparin drip and later TPA.  Status post lower extremity angiogram with stent placement in the left popliteal artery.  Patient was started on clopidogrel, aspirin, rivaroxaban.    According to vascular surgery recommended the patient to follow-up with them as an outpatient.  In addition the patient developed elevated troponin and because of that cardiology was consulted and they continue medical management as well. Echo was done and showed LVEF 20% and he was treated with diuresis. Please see below for discharged medications. Card recommended only to restart on plavix and xarelto.  During hospitalization the patient also developed severe anemia requiring blood transfusion.  Stool for occult blood was positive and because at that GI was consulted and did endoscopy.  Per GI the patient can be restarted on all his anticoagulation including  Plavix and Xarelto.  The patient was evaluated by PT and OT and recommended SNF but the patient insisted that he wanted to go home.  Because of that home health was arranged.     Please call [phone] to schedule PCP appointment for patient.    Required specialty appointments include:     As below  Therefore, he is discharged in fair and stable condition to home with close outpatient follow-up.    The  patient met 2-midnight criteria for an inpatient stay at the time of discharge.    Discharge Date  07/17/20      FOLLOW UP ITEMS POST DISCHARGE      DISCHARGE DIAGNOSES  Principal Problem:    Critical lower limb ischemia POA: Yes  Active Problems:    Acute renal failure superimposed on stage 3 chronic kidney disease (HCC) POA: Yes    Non-STEMI (non-ST elevated myocardial infarction) ([facility]) POA: No    Acute on chronic combined systolic and diastolic heart failure (HCC) POA: Yes    PAD (peripheral artery disease) ([facility]) POA: Yes    Dyslipidemia POA: Yes    Essential hypertension POA: Yes    S/P AKA (above knee amputation), right (HCC) POA: Yes    History of tobacco abuse POA: Yes    Ischemic cardiomyopathy POA: Yes    Hyponatremia POA: Yes    Hyperkalemia POA: Yes    Hypoglycemia POA: Unknown    Hyperglycemia POA: Unknown    Normocytic anemia POA: Unknown    GI bleed POA: Unknown  Resolved Problems:    * No resolved hospital problems. *      FOLLOW UP  No future appointments.  Eligio Culp M.D.  [address] [facility] [address]  [place] [address] [phone]    Schedule an appointment as soon as possible for a visit in 2 weeks  For Progress Check    Amy Almaraz M.D.  [address] [facility] [address]  [place] [id number] [phone]    Go on 7/22/2020  Please check in at 1:30pm for a hospital follow up at 1:45pm with Primary Care.     TOMAS Reed  [address] [facility] [address]  [place] [address] [phone]    Go on 7/24/2020  9:45am check in with [facility].     SONJA Almendarez  [address] [place] [id number] [phone]    Go on 7/24/2020  Over the phone consultation with Gastroenterology at 1pm. They will call you.     Griselda Barclay M.D.  [address]  [place] [id number] [phone]    In 1 week        MEDICATIONS ON DISCHARGE     Medication List      START taking these medications      Instructions   atorvastatin 40 MG Tabs  Commonly known as:   LIPITOR   Take 1 Tab by mouth every evening.  Dose:  40 mg     clopidogrel 75 MG Tabs  Commonly known as:  PLAVIX   Take 1 Tab by mouth every day.  Dose:  75 mg     ferrous sulfate 325 (65 Fe) MG tablet   Take 1 Tab by mouth 2 times a day, with meals.  Dose:  325 mg     furosemide 20 MG Tabs  Commonly known as:  LASIX   Take 2 Tabs by mouth every day.  Dose:  40 mg     losartan 25 MG Tabs  Commonly known as:  COZAAR   Take 1 Tab by mouth every day.  Dose:  25 mg     nitroglycerin 0.4 MG Subl  Commonly known as:  NITROSTAT   Place 1 Tab under tongue as needed for Chest Pain.  Dose:  0.4 mg     omeprazole 20 MG delayed-release capsule  Commonly known as:  PRILOSEC   Take 1 Cap by mouth every day.  Dose:  20 mg     oxyCODONE immediate-release 5 MG Tabs  Commonly known as:  ROXICODONE   Take 1 Tab by mouth every four hours as needed for up to 3 days.  Dose:  5 mg     potassium chloride SA 10 MEQ Tbcr  Commonly known as:  K-DUR   Take 2 Tabs by mouth every day.  Dose:  20 mEq     rivaroxaban 2.5 MG tablet  Commonly known as:  Xarelto   Take 1 Tab by mouth 2 Times a Day.  Dose:  2.5 mg     spironolactone 25 MG Tabs  Commonly known as:  ALDACTONE   Take 1 Tab by mouth every day.  Dose:  25 mg        CONTINUE taking these medications      Instructions   gabapentin 300 MG Caps  Commonly known as:  NEURONTIN   Take 300 mg by mouth 3 times a day.  Dose:  300 mg     metoprolol SR 50 MG Tb24  Commonly known as:  TOPROL XL   Take 50 mg by mouth every day.  Dose:  50 mg     tamsulosin 0.4 MG capsule  Commonly known as:  FLOMAX   Take 1 Cap by mouth ONE-HALF HOUR AFTER BREAKFAST.  Dose:  0.4 mg        STOP taking these medications    hydroCHLOROthiazide 25 MG Tabs  Commonly known as:  HYDRODIURIL            Allergies  No Known Allergies    DIET  Orders Placed This Encounter   Procedures   • Diet Order Low Fiber(GI Soft)     Standing Status:   Standing     Number of Occurrences:   1     Order Specific Question:   Diet:     Answer:    Low Fiber(GI Soft) [2]       ACTIVITY  As tolerated.  Weight bearing as tolerated    CONSULTATIONS  As above    PROCEDURES      LABORATORY  Lab Results   Component Value Date    SODIUM 135 07/16/2020    POTASSIUM 3.4 (L) 07/16/2020    CHLORIDE 100 07/16/2020    CO2 24 07/16/2020    GLUCOSE 165 (H) 07/16/2020    BUN 18 07/16/2020    CREATININE 1.57 (H) 07/16/2020        Lab Results   Component Value Date    WBC 9.4 07/16/2020    HEMOGLOBIN 8.6 (L) 07/16/2020    HEMATOCRIT 27.2 (L) 07/16/2020    PLATELETCT 281 07/16/2020        Total time of the discharge process exceeds 40 minutes.

## 2020-07-17 NOTE — PROGRESS NOTES
Patient received discharge education as well as patient's daughter. Patient went home with all belongings including cell phone and wheelchair. Patient received heart failure packet. Patient and daughter had no further questions and were planning on picking up prescriptions on the way home. Patient has home health set up and is leaving with his daughter.

## 2020-07-17 NOTE — PROGRESS NOTES
Assumed care at 1900, bedside report received from LUIS FERNANDO Miller. Patient is AO x 4 and is resting in bed. Initial assessment completed and orders reviewed. POC discussed with patient. Call light within reach and hourly rounding in place. No further questions at this time. Fall precautions in place.

## 2020-07-17 NOTE — FACE TO FACE
Face to Face Supporting Documentation - Home Health    The encounter with this patient was in whole or in part the primary reason for home health admission.    Date of encounter:   Patient:                    MRN:                       YOB: 2020  Antonio Walker  [id number]  1950     Home health to see patient for:  Skilled Nursing care for assessment, interventions & education    Skilled need for:  Comment: weakness    Skilled nursing interventions to include:  Comment: PT/OT    Homebound status evidenced by:  Need the aid of supportive devices such as crutches, canes, wheelchairs or walkers. Leaving home requires a considerable and taxing effort. There is a normal inability to leave the home.    Community Physician to provide follow up care: Amy Almaraz M.D.     Optional Interventions? No      I certify the face to face encounter for this home health care referral meets the CMS requirements and the encounter/clinical assessment with the patient was, in whole, or in part, for the medical condition(s) listed above, which is the primary reason for home health care. Based on my clinical findings: the service(s) are medically necessary, support the need for home health care, and the homebound criteria are met.  I certify that this patient has had a face to face encounter by myself.  Du Noble M.D. - NPI: [id number]

## 2020-07-17 NOTE — DISCHARGE INSTRUCTIONS
Discharge Instructions    Discharged to home by car with relative. Discharged via wheelchair, hospital escort: Yes.  Special equipment needed: Wheelchair    Be sure to schedule a follow-up appointment with your primary care doctor or any specialists as instructed.     Discharge Plan:        I understand that a diet low in cholesterol, fat, and sodium is recommended for good health. Unless I have been given specific instructions below for another diet, I accept this instruction as my diet prescription.   Other diet: Low fiber, GI Soft     Special Instructions:   HF Patient Discharge Instructions  · Monitor your weight daily, and maintain a weight chart, to track your weight changes.   · Activity as tolerated, unless your Doctor has ordered otherwise. Other activity order: As tolerated.  · Follow a low fat, low cholesterol, low salt diet unless instructed otherwise by your Doctor. Read the labels on the back of food products and track your intake of fat, cholesterol and salt.   · Fluid Restriction No. If a Fluid Restriction has been ordered by your Doctor, measure fluids with a measuring cup to ensure that you are not exceeding the restriction.   · No smoking.  · Oxygen No. If your Doctor has ordered that you wear Oxygen at home, it is important to wear it as ordered.  · Did you receive an explanation from staff on the importance of taking each of your medications and why it is necessary to keep taking them unless your doctor says to stop? Yes  · Were all of your questions answered about how to manage your heart failure and what to do if you have increased signs and symptoms after you go home? Yes  · Do you feel like your heart failure care team involved you in the care treatment plan and allowed you to make decisions regarding your care while in the hospital and addressed any discharge needs you might have? Yes    See the educational handout provided at discharge for more information on monitoring your daily weight,  activity and diet. This also explains more about Heart Failure, symptoms of a flare-up and some of the tests that you have undergone.     Warning Signs of a Flare-Up include:  · Swelling in the ankles or lower legs.  · Shortness of breath, while at rest, or while doing normal activities.   · Shortness of breath at night when in bed, or coughing in bed.   · Requiring more pillows to sleep at night, or needing to sit up at night to sleep.  · Feeling weak, dizzy or fatigued.     When to call your Doctor:  · Call [facility] seven days a week from 8:00 a.m. to 8:00 p.m. for medical questions [phone].  · Call your Primary Care Physician or Cardiologist if:   1. You experience any pain radiating to your jaw or neck.  2. You have any difficulty breathing.  3. You experience weight gain of 3 lbs in a day or 5 lbs in a week.   4. You feel any palpitations or irregular heartbeats.  5. You become dizzy or lose consciousness.   If you have had an angiogram or had a pacemaker or AICD placed, and experience:  1. Bleeding, drainage or swelling at the surgical / puncture site.  2. Fever greater than 100.0 F  3. Shock from internal defibrillator.  4. Cool and / or numb extremities.      · Is patient discharged on Warfarin / Coumadin?   No     Depression / Suicide Risk    As you are discharged from this [facility], it is important to learn how to keep safe from harming yourself.    Recognize the warning signs:  · Abrupt changes in personality, positive or negative- including increase in energy   · Giving away possessions  · Change in eating patterns- significant weight changes-  positive or negative  · Change in sleeping patterns- unable to sleep or sleeping all the time   · Unwillingness or inability to communicate  · Depression  · Unusual sadness, discouragement and loneliness  · Talk of wanting to die  · Neglect of personal appearance   · Rebelliousness- reckless behavior  · Withdrawal from  people/activities they love  · Confusion- inability to concentrate     If you or a loved one observes any of these behaviors or has concerns about self-harm, here's what you can do:  · Talk about it- your feelings and reasons for harming yourself  · Remove any means that you might use to hurt yourself (examples: pills, rope, extension cords, firearm)  · Get professional help from the community (Mental Health, Substance Abuse, psychological counseling)  · Do not be alone:Call your Safe Contact- someone whom you trust who will be there for you.  · Call your local CRISIS HOTLINE 725-9879 or 092-532-2482  · Call your local Children's Mobile Crisis Response Team [place] (855) 761-2545 or www.[employer]  · Call the toll free National Suicide Prevention Hotlines   · National Suicide Prevention Lifeline 291-065-MMDE (5170)  · National [employer] Line Network 800-SUICIDE (499-6573)    Oxycodone tablets or capsules  What is this medicine?  OXYCODONE (ox i KOE done) is a pain reliever. It is used to treat moderate to severe pain.  This medicine may be used for other purposes; ask your health care provider or pharmacist if you have questions.  COMMON BRAND NAME(S): Dazidox, Endocodone, Oxaydo, OXECTA, OxyIR, Percolone, Roxicodone, Roxybond  What should I tell my health care provider before I take this medicine?  They need to know if you have any of these conditions:  · Chisago's disease  · brain tumor  · head injury  · heart disease  · history of drug or alcohol abuse problem  · if you often drink alcohol  · kidney disease  · liver disease  · lung or breathing disease, like asthma  · mental illness  · pancreatic disease  · seizures  · thyroid disease  · an unusual or allergic reaction to oxycodone, codeine, hydrocodone, morphine, other medicines, foods, dyes, or preservatives  · pregnant or trying to get pregnant  · breast-feeding  How should I use this medicine?  Take this medicine by mouth with a glass of water. Follow  the directions on the prescription label. You can take it with or without food. If it upsets your stomach, take it with food. Take your medicine at regular intervals. Do not take it more often than directed. Do not stop taking except on your doctor's advice.  Some brands of this medicine, like Oxecta, have special instructions. Ask your doctor or pharmacist if these directions are for you: Do not cut, crush or chew this medicine. Swallow only one tablet at a time. Do not wet, soak, or lick the tablet before you take it.  A special MedGuide will be given to you by the pharmacist with each prescription and refill. Be sure to read this information carefully each time.  Talk to your pediatrician regarding the use of this medicine in children. Special care may be needed.  Overdosage: If you think you have taken too much of this medicine contact a poison control center or emergency room at once.  NOTE: This medicine is only for you. Do not share this medicine with others.  What if I miss a dose?  If you miss a dose, take it as soon as you can. If it is almost time for your next dose, take only that dose. Do not take double or extra doses.  What may interact with this medicine?  This medicine may interact with the following medications:  · alcohol  · antihistamines for allergy, cough and cold  · antiviral medicines for HIV or AIDS  · atropine  · certain antibiotics like clarithromycin, erythromycin, linezolid, rifampin  · certain medicines for anxiety or sleep  · certain medicines for bladder problems like oxybutynin, tolterodine  · certain medicines for depression like amitriptyline, fluoxetine, sertraline  · certain medicines for fungal infections like ketoconazole, itraconazole, voriconazole  · certain medicines for migraine headache like almotriptan, eletriptan, frovatriptan, naratriptan, rizatriptan, sumatriptan, zolmitriptan  · certain medicines for nausea or vomiting like dolasetron, ondansetron,  palonosetron  · certain medicines for Parkinson's disease like benztropine, trihexyphenidyl  · certain medicines for seizures like phenobarbital, phenytoin, primidone  · certain medicines for stomach problems like dicyclomine, hyoscyamine  · certain medicines for travel sickness like scopolamine  · diuretics  · general anesthetics like halothane, isoflurane, methoxyflurane, propofol  · ipratropium  · local anesthetics like lidocaine, pramoxine, tetracaine  · MAOIs like Carbex, Eldepryl, Marplan, Nardil, and Parnate  · medicines that relax muscles for surgery  · methylene blue  · nilotinib  · other narcotic medicines for pain or cough  · phenothiazines like chlorpromazine, mesoridazine, prochlorperazine, thioridazine  This list may not describe all possible interactions. Give your health care provider a list of all the medicines, herbs, non-prescription drugs, or dietary supplements you use. Also tell them if you smoke, drink alcohol, or use illegal drugs. Some items may interact with your medicine.  What should I watch for while using this medicine?  Tell your doctor or health care professional if your pain does not go away, if it gets worse, or if you have new or a different type of pain. You may develop tolerance to the medicine. Tolerance means that you will need a higher dose of the medicine for pain relief. Tolerance is normal and is expected if you take this medicine for a long time.  Do not suddenly stop taking your medicine because you may develop a severe reaction. Your body becomes used to the medicine. This does NOT mean you are addicted. Addiction is a behavior related to getting and using a drug for a non-medical reason. If you have pain, you have a medical reason to take pain medicine. Your doctor will tell you how much medicine to take. If your doctor wants you to stop the medicine, the dose will be slowly lowered over time to avoid any side effects.  There are different types of narcotic medicines  (opiates). If you take more than one type at the same time or if you are taking another medicine that also causes drowsiness, you may have more side effects. Give your health care provider a list of all medicines you use. Your doctor will tell you how much medicine to take. Do not take more medicine than directed. Call emergency for help if you have problems breathing or unusual sleepiness.  You may get drowsy or dizzy. Do not drive, use machinery, or do anything that needs mental alertness until you know how the medicine affects you. Do not stand or sit up quickly, especially if you are an older patient. This reduces the risk of dizzy or fainting spells. Alcohol may interfere with the effect of this medicine. Avoid alcoholic drinks.  This medicine will cause constipation. Try to have a bowel movement at least every 2 to 3 days. If you do not have a bowel movement for 3 days, call your doctor or health care professional.  Your mouth may get dry. Chewing sugarless gum or sucking hard candy, and drinking plenty of water may help. Contact your doctor if the problem does not go away or is severe.  What side effects may I notice from receiving this medicine?  Side effects that you should report to your doctor or health care professional as soon as possible:  · allergic reactions like skin rash, itching or hives, swelling of the face, lips, or tongue  · breathing problems  · confusion  · signs and symptoms of low blood pressure like dizziness; feeling faint or lightheaded, falls; unusually weak or tired  · trouble passing urine or change in the amount of urine  · trouble swallowing  Side effects that usually do not require medical attention (report to your doctor or health care professional if they continue or are bothersome):  · constipation  · dry mouth  · nausea, vomiting  · tiredness  This list may not describe all possible side effects. Call your doctor for medical advice about side effects. You may report side  effects to FDA at 4-662-ERF-0515.  Where should I keep my medicine?  Keep out of the reach of children. This medicine can be abused. Keep your medicine in a safe place to protect it from theft. Do not share this medicine with anyone. Selling or giving away this medicine is dangerous and against the law.  Store at room temperature between 15 and 30 degrees C (59 and 86 degrees F). Protect from light. Keep container tightly closed.  This medicine may cause harm and death if it is taken by other adults, children, or pets. Return medicine that has not been used to an official disposal site. Contact the [employer] at 1-454.530.8463 or your [facility] government to find a site. If you cannot return the medicine, flush it down the toilet. Do not use the medicine after the expiration date.  NOTE: This sheet is a summary. It may not cover all possible information. If you have questions about this medicine, talk to your doctor, pharmacist, or health care provider.  © 2020 [employer] (2018-04-24 16:13:10)  Omeprazole tablets (OTC)  What is this medicine?  OMEPRAZOLE (oh ME pray zol) prevents the production of acid in the stomach. It is used to treat the symptoms of heartburn. You can buy this medicine without a prescription. This product is not for long-term use, unless otherwise directed by your doctor or health care professional.  This medicine may be used for other purposes; ask your health care provider or pharmacist if you have questions.  COMMON BRAND NAME(S): Prilosec OTC  What should I tell my health care provider before I take this medicine?  They need to know if you have any of these conditions:  · black or bloody stools  · chest pain  · difficulty swallowing  · have had heartburn for over 3 months  · have heartburn with dizziness, lightheadedness or sweating  · liver disease  · lupus  · stomach pain  · unexplained weight loss  · vomiting with blood  · wheezing  · an unusual or allergic reaction to omeprazole,  other medicines, foods, dyes, or preservatives  · pregnant or trying to get pregnant  · breast-feeding  How should I use this medicine?  Take this medicine by mouth with a glass of water. Follow the directions on the product label. Do not cut, crush or chew this medicine. Swallow the tablets whole. Take this medicine on an empty stomach, at least 30 minutes before breakfast. Take your medicine at regular intervals. Do not take it more often than directed.  Talk to your pediatrician regarding the use of this medicine in children. Special care may be needed.  Overdosage: If you think you have taken too much of this medicine contact a poison control center or emergency room at once.  NOTE: This medicine is only for you. Do not share this medicine with others.  What if I miss a dose?  If you miss a dose, take it as soon as you can. If it is almost time for your next dose, take only that dose. Do not take double or extra doses.  What may interact with this medicine?  Do not take this medicine with any of the following medications:  · atazanavir  · clopidogrel  · nelfinavir  · rilpivirine  This medicine may also interact with the following medications:  · antifungals like itraconazole, ketoconazole, and voriconazole  · certain antivirals for HIV or hepatitis  · certain medicines that treat or prevent blood clots like warfarin  · cilostazol  · citalopram  · cyclosporine  · dasatinib  · digoxin  · disulfiram  · diuretics  · erlotinib  · iron supplements  · medicines for anxiety, panic, and sleep like diazepam  · medicines for seizures like carbamazepine, phenobarbital, phenytoin  · methotrexate  · mycophenolate mofetil  · nilotinib  · rifampin  · German's wort  · tacrolimus  · vitamin B12  This list may not describe all possible interactions. Give your health care provider a list of all the medicines, herbs, non-prescription drugs, or dietary supplements you use. Also tell them if you smoke, drink alcohol, or use illegal  drugs. Some items may interact with your medicine.  What should I watch for while using this medicine?  It can take several days before your heartburn gets better. Tell your healthcare professional if your symptoms do not start to get better or if they get worse. If you need to take this medicine for more than 14 days, talk to your healthcare professional. Heartburn may sometimes be caused by a more serious condition.  This medicine may cause a decrease in vitamin B12. You should make sure that you get enough vitamin B12 while you are taking this medicine. Discuss the foods you eat and the vitamins you take with your health care professional.  What side effects may I notice from receiving this medicine?  Side effects that you should report to your doctor or health care professional as soon as possible:  · allergic reactions like skin rash, itching or hives, swelling of the face, lips, or tongue  · bone pain  · breathing problems  · fever or sore throat  · joint pain  · rash on cheeks or arms that gets worse in the sun  · redness, blistering, peeling, or loosening of the skin, including inside the mouth  · severe diarrhea  · signs and symptoms of kidney injury like trouble passing urine or change in the amount of urine  · signs and symptoms of low magnesium like muscle cramps; muscle pain; muscle weakness; tremors; seizures; or fast, irregular heartbeat  · stomach polyps  · unusual bleeding or bruising  Side effects that usually do not require medical attention (report to your doctor or health care professional if they continue or are bothersome):  · diarrhea  · dry mouth  · gas  · headache  · nausea  · stomach pain  This list may not describe all possible side effects. Call your doctor for medical advice about side effects. You may report side effects to FDA at 6-842-FDA-9307.  Where should I keep my medicine?  Keep out of the reach of children.  Store at room temperature between 20 and 25 degrees C (68 and 77  degrees F). Protect from light and moisture. Throw away any unused medicine after the expiration date.  NOTE: This sheet is a summary. It may not cover all possible information. If you have questions about this medicine, talk to your doctor, pharmacist, or health care provider.  © 2020 [employer] Standard (2019-10-09 13:06:30)  Nitroglycerin sublingual tablets  What is this medicine?  NITROGLYCERIN (coretta troe GLI ser in) is a type of vasodilator. It relaxes blood vessels, increasing the blood and oxygen supply to your heart. This medicine is used to relieve chest pain caused by angina. It is also used to prevent chest pain before activities like climbing stairs, going outdoors in cold weather, or sexual activity.  This medicine may be used for other purposes; ask your health care provider or pharmacist if you have questions.  COMMON BRAND NAME(S): Nitroquick, Nitrostat, Nitrotab  What should I tell my health care provider before I take this medicine?  They need to know if you have any of these conditions:  · anemia  · head injury, recent stroke, or bleeding in the brain  · liver disease  · previous heart attack  · an unusual or allergic reaction to nitroglycerin, other medicines, foods, dyes, or preservatives  · pregnant or trying to get pregnant  · breast-feeding  How should I use this medicine?  Take this medicine by mouth as needed. At the first sign of an angina attack (chest pain or tightness) place one tablet under your tongue. You can also take this medicine 5 to 10 minutes before an event likely to produce chest pain. Follow the directions on the prescription label. Let the tablet dissolve under the tongue. Do not swallow whole. Replace the dose if you accidentally swallow it. It will help if your mouth is not dry. Saliva around the tablet will help it to dissolve more quickly. Do not eat or drink, smoke or chew tobacco while a tablet is dissolving. If you are not better within 5 minutes after taking ONE  dose of nitroglycerin, call 9-1-1 immediately to seek emergency medical care. Do not take more than 3 nitroglycerin tablets over 15 minutes.  If you take this medicine often to relieve symptoms of angina, your doctor or health care professional may provide you with different instructions to manage your symptoms. If symptoms do not go away after following these instructions, it is important to call 9-1-1 immediately. Do not take more than 3 nitroglycerin tablets over 15 minutes.  Talk to your pediatrician regarding the use of this medicine in children. Special care may be needed.  Overdosage: If you think you have taken too much of this medicine contact a poison control center or emergency room at once.  NOTE: This medicine is only for you. Do not share this medicine with others.  What if I miss a dose?  This does not apply. This medicine is only used as needed.  What may interact with this medicine?  Do not take this medicine with any of the following medications:  · certain migraine medicines like ergotamine and dihydroergotamine (DHE)  · medicines used to treat erectile dysfunction like sildenafil, tadalafil, and vardenafil  · riociguat  This medicine may also interact with the following medications:  · alteplase  · aspirin  · heparin  · medicines for high blood pressure  · medicines for mental depression  · other medicines used to treat angina  · phenothiazines like chlorpromazine, mesoridazine, prochlorperazine, thioridazine  This list may not describe all possible interactions. Give your health care provider a list of all the medicines, herbs, non-prescription drugs, or dietary supplements you use. Also tell them if you smoke, drink alcohol, or use illegal drugs. Some items may interact with your medicine.  What should I watch for while using this medicine?  Tell your doctor or health care professional if you feel your medicine is no longer working.  Keep this medicine with you at all times. Sit or lie down  when you take your medicine to prevent falling if you feel dizzy or faint after using it. Try to remain calm. This will help you to feel better faster. If you feel dizzy, take several deep breaths and lie down with your feet propped up, or bend forward with your head resting between your knees.  You may get drowsy or dizzy. Do not drive, use machinery, or do anything that needs mental alertness until you know how this drug affects you. Do not stand or sit up quickly, especially if you are an older patient. This reduces the risk of dizzy or fainting spells. Alcohol can make you more drowsy and dizzy. Avoid alcoholic drinks.  Do not treat yourself for coughs, colds, or pain while you are taking this medicine without asking your doctor or health care professional for advice. Some ingredients may increase your blood pressure.  What side effects may I notice from receiving this medicine?  Side effects that you should report to your doctor or health care professional as soon as possible:  · blurred vision  · dry mouth  · skin rash  · sweating  · the feeling of extreme pressure in the head  · unusually weak or tired  Side effects that usually do not require medical attention (report to your doctor or health care professional if they continue or are bothersome):  · flushing of the face or neck  · headache  · irregular heartbeat, palpitations  · nausea, vomiting  This list may not describe all possible side effects. Call your doctor for medical advice about side effects. You may report side effects to FDA at 6-649-FDA-6280.  Where should I keep my medicine?  Keep out of the reach of children.  Store at room temperature between 20 and 25 degrees C (68 and 77 degrees F). Store in original container. Protect from light and moisture. Keep tightly closed. Throw away any unused medicine after the expiration date.  NOTE: This sheet is a summary. It may not cover all possible information. If you have questions about this medicine,  talk to your doctor, pharmacist, or health care provider.  © 2020 [employer] Standard (2014-10-16 17:57:36)  Losartan tablets  What is this medicine?  LOSARTAN (tang TERESA tan) is used to treat high blood pressure and to reduce the risk of stroke in certain patients. This drug also slows the progression of kidney disease in patients with diabetes.  This medicine may be used for other purposes; ask your health care provider or pharmacist if you have questions.  COMMON BRAND NAME(S): Cozaar  What should I tell my health care provider before I take this medicine?  They need to know if you have any of these conditions:  · heart failure  · kidney or liver disease  · an unusual or allergic reaction to losartan, other medicines, foods, dyes, or preservatives  · pregnant or trying to get pregnant  · breast-feeding  How should I use this medicine?  Take this medicine by mouth with a glass of water. Follow the directions on the prescription label. This medicine can be taken with or without food. Take your doses at regular intervals. Do not take your medicine more often than directed.  Talk to your pediatrician regarding the use of this medicine in children. Special care may be needed.  Overdosage: If you think you have taken too much of this medicine contact a poison control center or emergency room at once.  NOTE: This medicine is only for you. Do not share this medicine with others.  What if I miss a dose?  If you miss a dose, take it as soon as you can. If it is almost time for your next dose, take only that dose. Do not take double or extra doses.  What may interact with this medicine?  · blood pressure medicines  · diuretics, especially triamterene, spironolactone, or amiloride  · fluconazole  · NSAIDs, medicines for pain and inflammation, like ibuprofen or naproxen  · potassium salts or potassium supplements  · rifampin  This list may not describe all possible interactions. Give your health care provider a list of all  the medicines, herbs, non-prescription drugs, or dietary supplements you use. Also tell them if you smoke, drink alcohol, or use illegal drugs. Some items may interact with your medicine.  What should I watch for while using this medicine?  Visit your doctor or health care professional for regular checks on your progress. Check your blood pressure as directed. Ask your doctor or health care professional what your blood pressure should be and when you should contact him or her. Call your doctor or health care professional if you notice an irregular or fast heart beat.  Women should inform their doctor if they wish to become pregnant or think they might be pregnant. There is a potential for serious side effects to an unborn child, particularly in the second or third trimester. Talk to your health care professional or pharmacist for more information.  You may get drowsy or dizzy. Do not drive, use machinery, or do anything that needs mental alertness until you know how this drug affects you. Do not stand or sit up quickly, especially if you are an older patient. This reduces the risk of dizzy or fainting spells. Alcohol can make you more drowsy and dizzy. Avoid alcoholic drinks.  Avoid salt substitutes unless you are told otherwise by your doctor or health care professional.  Do not treat yourself for coughs, colds, or pain while you are taking this medicine without asking your doctor or health care professional for advice. Some ingredients may increase your blood pressure.  What side effects may I notice from receiving this medicine?  Side effects that you should report to your doctor or health care professional as soon as possible:  · confusion, dizziness, light headedness or fainting spells  · decreased amount of urine passed  · difficulty breathing or swallowing, hoarseness, or tightening of the throat  · fast or irregular heart beat, palpitations, or chest pain  · skin rash, itching  · swelling of your face, lips,  tongue, hands, or feet  Side effects that usually do not require medical attention (report to your doctor or health care professional if they continue or are bothersome):  · cough  · decreased sexual function or desire  · headache  · nasal congestion or stuffiness  · nausea or stomach pain  · sore or cramping muscles  This list may not describe all possible side effects. Call your doctor for medical advice about side effects. You may report side effects to FDA at 7-487-WHD-4104.  Where should I keep my medicine?  Keep out of the reach of children.  Store at room temperature between 15 and 30 degrees C (59 and 86 degrees F). Protect from light. Keep container tightly closed. Throw away any unused medicine after the expiration date.  NOTE: This sheet is a summary. It may not cover all possible information. If you have questions about this medicine, talk to your doctor, pharmacist, or health care provider.  © 2020 [employer] (2009-02-27 16:42:18)  Furosemide tablets  What is this medicine?  FUROSEMIDE (fyoor [place] se mide) is a diuretic. It helps you make more urine and to lose salt and excess water from your body. This medicine is used to treat high blood pressure, and edema or swelling from heart, kidney, or liver disease.  This medicine may be used for other purposes; ask your health care provider or pharmacist if you have questions.  COMMON BRAND NAME(S): Active-Medicated Specimen Kit, Delone, Diuscreen, Lasix, RX Specimen Collection Kit, Specimen Collection Kit, URINX Medicated Specimen Collection  What should I tell my health care provider before I take this medicine?  They need to know if you have any of these conditions:  abnormal blood electrolytes  diarrhea or vomiting  gout  heart disease  kidney disease, small amounts of urine, or difficulty passing urine  liver disease  thyroid disease  an unusual or allergic reaction to furosemide, sulfa drugs, other medicines, foods, dyes, or  preservatives  pregnant or trying to get pregnant  breast-feeding  How should I use this medicine?  Take this medicine by mouth with a glass of water. Follow the directions on the prescription label. You may take this medicine with or without food. If it upsets your stomach, take it with food or milk. Do not take your medicine more often than directed. Remember that you will need to pass more urine after taking this medicine. Do not take your medicine at a time of day that will cause you problems. Do not take at bedtime.  Talk to your pediatrician regarding the use of this medicine in children. While this drug may be prescribed for selected conditions, precautions do apply.  Overdosage: If you think you have taken too much of this medicine contact a poison control center or emergency room at once.  NOTE: This medicine is only for you. Do not share this medicine with others.  What if I miss a dose?  If you miss a dose, take it as soon as you can. If it is almost time for your next dose, take only that dose. Do not take double or extra doses.  What may interact with this medicine?  aspirin and aspirin-like medicines  certain antibiotics  chloral hydrate  cisplatin  cyclosporine  digoxin  diuretics  laxatives  lithium  medicines for blood pressure  medicines that relax muscles for surgery  methotrexate  NSAIDs, medicines for pain and inflammation like ibuprofen, naproxen, or indomethacin  phenytoin  steroid medicines like prednisone or cortisone  sucralfate  thyroid hormones  This list may not describe all possible interactions. Give your health care provider a list of all the medicines, herbs, non-prescription drugs, or dietary supplements you use. Also tell them if you smoke, drink alcohol, or use illegal drugs. Some items may interact with your medicine.  What should I watch for while using this medicine?  Visit your doctor or health care provider for regular checks on your progress. Check your blood pressure  regularly. Ask your doctor or health care provider what your blood pressure should be, and when you should contact him or her. If you are a diabetic, check your blood sugar as directed.  This medicine may cause serious skin reactions. They can happen weeks to months after starting the medicine. Contact your health care provider right away if you notice fevers or flu-like symptoms with a rash. The rash may be red or purple and then turn into blisters or peeling of the skin. Or, you might notice a red rash with swelling of the face, lips or lymph nodes in your neck or under your arms.  You may need to be on a special diet while taking this medicine. Check with your doctor. Also, ask how many glasses of fluid you need to drink a day. You must not get dehydrated.  You may get drowsy or dizzy. Do not drive, use machinery, or do anything that needs mental alertness until you know how this drug affects you. Do not stand or sit up quickly, especially if you are an older patient. This reduces the risk of dizzy or fainting spells. Alcohol can make you more drowsy and dizzy. Avoid alcoholic drinks.  This medicine can make you more sensitive to the sun. Keep out of the sun. If you cannot avoid being in the sun, wear protective clothing and use sunscreen. Do not use sun lamps or tanning beds/booths.  What side effects may I notice from receiving this medicine?  Side effects that you should report to your doctor or health care professional as soon as possible:  blood in urine or stools  dry mouth  fever or chills  hearing loss or ringing in the ears  irregular heartbeat  muscle pain or weakness, cramps  rash, fever, and swollen lymph nodes  redness, blistering, peeling or loosening of the skin, including inside the mouth  skin rash  stomach upset, pain, or nausea  tingling or numbness in the hands or feet  unusually weak or tired  vomiting or diarrhea  yellowing of the eyes or skin  Side effects that usually do not require  medical attention (report to your doctor or health care professional if they continue or are bothersome):  headache  loss of appetite  unusual bleeding or bruising  This list may not describe all possible side effects. Call your doctor for medical advice about side effects. You may report side effects to FDA at 0-863-UJJ-2876.  Where should I keep my medicine?  Keep out of the reach of children.  Store at room temperature between 15 and 30 degrees C (59 and 86 degrees F). Protect from light. Throw away any unused medicine after the expiration date.  NOTE: This sheet is a summary. It may not cover all possible information. If you have questions about this medicine, talk to your doctor, pharmacist, or health care provider.  © 2020 [employer] Standard (2020-03-20 14:04:13)  Clopidogrel tablets  What is this medicine?  CLOPIDOGREL (kloh PID oh grel) helps to prevent blood clots. This medicine is used to prevent heart attack, stroke, or other vascular events in people who are at high risk.  This medicine may be used for other purposes; ask your health care provider or pharmacist if you have questions.  COMMON BRAND NAME(S): Plavix  What should I tell my health care provider before I take this medicine?  They need to know if you have any of the following conditions:  · bleeding disorders  · bleeding in the brain  · having surgery  · history of stomach bleeding  · an unusual or allergic reaction to clopidogrel, other medicines, foods, dyes, or preservatives  · pregnant or trying to get pregnant  · breast-feeding  How should I use this medicine?  Take this medicine by mouth with a glass of water. Follow the directions on the prescription label. You may take this medicine with or without food. If it upsets your stomach, take it with food. Take your medicine at regular intervals. Do not take it more often than directed. Do not stop taking except on your doctor's advice.  A special MedGuide will be given to you by the  pharmacist with each prescription and refill. Be sure to read this information carefully each time.  Talk to your pediatrician regarding the use of this medicine in children. Special care may be needed.  Overdosage: If you think you have taken too much of this medicine contact a poison control center or emergency room at once.  NOTE: This medicine is only for you. Do not share this medicine with others.  What if I miss a dose?  If you miss a dose, take it as soon as you can. If it is almost time for your next dose, take only that dose. Do not take double or extra doses.  What may interact with this medicine?  Do not take this medicine with the following medications:  · dasabuvir; ombitasvir; paritaprevir; ritonavir  · defibrotide  · selexipag  This medicine may also interact with the following medications:  · certain medicines that treat or prevent blood clots like warfarin  · narcotic medicines for pain  · NSAIDs, medicines for pain and inflammation, like ibuprofen or naproxen  · repaglinide  · SNRIs, medicines for depression, like desvenlafaxine, duloxetine, levomilnacipran, venlafaxine  · SSRIs, medicines for depression, like citalopram, escitalopram, fluoxetine, fluvoxamine, paroxetine, sertraline  · stomach acid blockers like cimetidine, esomeprazole, omeprazole  This list may not describe all possible interactions. Give your health care provider a list of all the medicines, herbs, non-prescription drugs, or dietary supplements you use. Also tell them if you smoke, drink alcohol, or use illegal drugs. Some items may interact with your medicine.  What should I watch for while using this medicine?  Visit your doctor or health care professional for regular check-ups. Do not stop taking your medicine unless your doctor tells you to.  Notify your doctor or health care professional and seek emergency treatment if you develop breathing problems; changes in vision; chest pain; severe, sudden headache; pain, swelling,  warmth in the leg; trouble speaking; sudden numbness or weakness of the face, arm or leg. These can be signs that your condition has gotten worse.  If you are going to have surgery or dental work, tell your doctor or health care professional that you are taking this medicine.  Certain genetic factors may reduce the effect of this medicine. Your doctor may use genetic tests to determine treatment.  Only take aspirin if you are instructed to. Low doses of aspirin are used with this medicine to treat some conditions. Taking aspirin with this medicine can increase your risk of bleeding so you must be careful. Talk to your doctor or pharmacist if you have questions.  What side effects may I notice from receiving this medicine?  Side effects that you should report to your doctor or health care professional as soon as possible:  · allergic reactions like skin rash, itching or hives, swelling of the face, lips, or tongue  · signs and symptoms of bleeding such as bloody or black, tarry stools; red or dark-brown urine; spitting up blood or brown material that looks like coffee grounds; red spots on the skin; unusual bruising or bleeding from the eye, gums, or nose  · signs and symptoms of a blood clot such as breathing problems; changes in vision; chest pain; severe, sudden headache; pain, swelling, warmth in the leg; trouble speaking; sudden numbness or weakness of the face, arm or leg  · signs and symptoms of low blood sugar such as feeling anxious; confusion; dizziness; increased hunger; unusually weak or tired; increased sweating; shakiness; cold, clammy skin; irritable; headache; blurred vision; fast heartbeat; loss of consciousness  Side effects that usually do not require medical attention (report to your doctor or health care professional if they continue or are bothersome):  · constipation  · diarrhea  · headache  · upset stomach  This list may not describe all possible side effects. Call your doctor for medical  advice about side effects. You may report side effects to FDA at 3-767-NYS-6883.  Where should I keep my medicine?  Keep out of the reach of children.  Store at room temperature of 59 to 86 degrees F (15 to 30 degrees C). Throw away any unused medicine after the expiration date.  NOTE: This sheet is a summary. It may not cover all possible information. If you have questions about this medicine, talk to your doctor, pharmacist, or health care provider.  © 2020 [employer]/Gold Standard (2019-05-20 15:03:38)  Atorvastatin tablets  What is this medicine?  ATORVASTATIN (a TORE va sta tin) is known as a HMG-CoA reductase inhibitor or 'statin'. It lowers the level of cholesterol and triglycerides in the blood. This drug may also reduce the risk of heart attack, stroke, or other health problems in patients with risk factors for heart disease. Diet and lifestyle changes are often used with this drug.  This medicine may be used for other purposes; ask your health care provider or pharmacist if you have questions.  COMMON BRAND NAME(S): Lipitor  What should I tell my health care provider before I take this medicine?  They need to know if you have any of these conditions:  · diabetes  · if you often drink alcohol  · history of stroke  · kidney disease  · liver disease  · muscle aches or weakness  · thyroid disease  · an unusual or allergic reaction to atorvastatin, other medicines, foods, dyes, or preservatives  · pregnant or trying to get pregnant  · breast-feeding  How should I use this medicine?  Take this medicine by mouth with a glass of water. Follow the directions on the prescription label. You can take it with or without food. If it upsets your stomach, take it with food. Do not take with grapefruit juice. Take your medicine at regular intervals. Do not take it more often than directed. Do not stop taking except on your doctor's advice.  Talk to your pediatrician regarding the use of this medicine in children. While this  drug may be prescribed for children as young as 10 for selected conditions, precautions do apply.  Overdosage: If you think you have taken too much of this medicine contact a poison control center or emergency room at once.  NOTE: This medicine is only for you. Do not share this medicine with others.  What if I miss a dose?  If you miss a dose, take it as soon as you can. If your next dose is to be taken in less than 12 hours, then do not take the missed dose. Take the next dose at your regular time. Do not take double or extra doses.  What may interact with this medicine?  Do not take this medicine with any of the following medications:  · dasabuvir; ombitasvir; paritaprevir; ritonavir  · ombitasvir; paritaprevir; ritonavir  · posaconazole  · red yeast rice  This medicine may also interact with the following medications:  · alcohol  · birth control pills  · certain antibiotics like erythromycin and clarithromycin  · certain antivirals for HIV or hepatitis  · certain medicines for cholesterol like fenofibrate, gemfibrozil, and niacin  · certain medicines for fungal infections like ketoconazole and itraconazole  · colchicine  · cyclosporine  · digoxin  · grapefruit juice  · rifampin  This list may not describe all possible interactions. Give your health care provider a list of all the medicines, herbs, non-prescription drugs, or dietary supplements you use. Also tell them if you smoke, drink alcohol, or use illegal drugs. Some items may interact with your medicine.  What should I watch for while using this medicine?  Visit your doctor or health care professional for regular check-ups. You may need regular tests to make sure your liver is working properly.  Your health care professional may tell you to stop taking this medicine if you develop muscle problems. If your muscle problems do not go away after stopping this medicine, contact your health care professional.  Do not become pregnant while taking this medicine.  Women should inform their health care professional if they wish to become pregnant or think they might be pregnant. There is a potential for serious side effects to an unborn child. Talk to your health care professional or pharmacist for more information. Do not breast-feed an infant while taking this medicine.  This medicine may increase blood sugar. Ask your healthcare provider if changes in diet or medicines are needed if you have diabetes.  If you are going to need surgery or other procedure, tell your doctor that you are using this medicine.  This drug is only part of a total heart-health program. Your doctor or a dietician can suggest a low-cholesterol and low-fat diet to help. Avoid alcohol and smoking, and keep a proper exercise schedule.  This medicine may cause a decrease in Co-Enzyme Q-10. You should make sure that you get enough Co-Enzyme Q-10 while you are taking this medicine. Discuss the foods you eat and the vitamins you take with your health care professional.  What side effects may I notice from receiving this medicine?  Side effects that you should report to your doctor or health care professional as soon as possible:  · allergic reactions like skin rash, itching or hives, swelling of the face, lips, or tongue  · fever  · joint pain  · loss of memory  · redness, blistering, peeling or loosening of the skin, including inside the mouth  · signs and symptoms of high blood sugar such as being more thirsty or hungry or having to urinate more than normal. You may also feel very tired or have blurry vision.  · signs and symptoms of liver injury like dark yellow or brown urine; general ill feeling or flu-like symptoms; light-belly pain; unusually weak or tired; yellowing of the eyes or skin  · signs and symptoms of muscle injury like dark urine; trouble passing urine or change in the amount of urine; unusually weak or tired; muscle pain or side or back pain  Side effects that usually do not require  medical attention (report to your doctor or health care professional if they continue or are bothersome):  · diarrhea  · nausea  · stomach pain  · trouble sleeping  · upset stomach  This list may not describe all possible side effects. Call your doctor for medical advice about side effects. You may report side effects to FDA at 6-984-NJC-7696.  Where should I keep my medicine?  Keep out of the reach of children.  Store between 20 and 25 degrees C (68 and 77 degrees F). Throw away any unused medicine after the expiration date.  NOTE: This sheet is a summary. It may not cover all possible information. If you have questions about this medicine, talk to your doctor, pharmacist, or health care provider.  © 2020 [employer] (2019-10-09 11:36:16)    Aspirin and Your Heart    Aspirin is a medicine that prevents the cells in the blood that are used for clotting, called platelets, from sticking together. Aspirin can be used to help reduce the risk of blood clots, heart attacks, and other heart-related problems.  Can I take aspirin?  Your health care provider will help you determine whether it is safe and beneficial for you to take aspirin daily. Taking aspirin daily may be helpful if you:  · Have had a heart attack or chest pain.  · Are at risk for a heart attack.  · Have undergone open-heart surgery, such as coronary artery bypass surgery (CABG).  · Have had coronary angioplasty or a stent.  · Have had certain types of stroke or transient ischemic attack (TIA).  · Have peripheral artery disease (PAD).  · Have chronic heart rhythm problems such as atrial fibrillation and cannot take an anticoagulant.  · Have valve disease or have had surgery on a valve.  What are the risks?  Daily use of aspirin can cause side effects. Some of these include:  · Bleeding. Bleeding problems can be minor or serious. An example of a minor problem is a cut that does not stop bleeding. An example of a more serious problem is stomach  bleeding or, rarely, bleeding into the brain. Your risk of bleeding is increased if you are also taking non-steroidal anti-inflammatory drugs (NSAIDs).  · Increased bruising.  · Upset stomach.  · An allergic reaction. People who have nasal polyps have an increased risk of developing an aspirin allergy.  General guidelines  · Take aspirin only as told by your health care provider. Make sure that you understand how much you should take and what form you should take. The two forms of aspirin are:  ? Non-enteric-coated.This type of aspirin does not have a coating and is absorbed quickly. This type of aspirin also comes in a chewable form.  ? Enteric-coated. This type of aspirin has a coating that releases the medicine very slowly. Enteric-coated aspirin might cause less stomach upset than non-enteric-coated aspirin. This type of aspirin should not be chewed or crushed.  · Limit alcohol intake to no more than 1 drink a day for nonpregnant women and 2 drinks a day for men. Drinking alcohol increases your risk of bleeding. One drink equals 12 oz of beer, 5 oz of wine, or 1½ oz of hard liquor.  Contact a health care provider if you:  · Have unusual bleeding or bruising.  · Have stomach pain or nausea.  · Have ringing in your ears.  · Have an allergic reaction that causes:  ? Hives.  ? Itchy skin.  ? Swelling of the lips, tongue, or face.  Get help right away if you:  · Notice that your bowel movements are bloody, dark red, or black in color.  · Vomit or cough up blood.  · Have blood in your urine.  · Cough, have noisy breathing (wheeze), or feel short of breath.  · Have chest pain, especially if the pain spreads to the arms, back, neck, or jaw.  · Have a severe headache, or a headache with confusion, or dizziness.  These symptoms may represent a serious problem that is an emergency. Do not wait to see if the symptoms will go away. Get medical help right away. Call your local emergency services (911 in the [place] Do not  drive yourself to the hospital.  Summary  · Aspirin can be used to help reduce the risk of blood clots, heart attacks, and other heart-related problems.  · Daily use of aspirin can increase your risk of side effects. Your health care provider will help you determine whether it is safe and beneficial for you to take aspirin daily.  · Take aspirin only as told by your health care provider. Make sure that you understand how much you can take and what form you can take.  This information is not intended to replace advice given to you by your health care provider. Make sure you discuss any questions you have with your health care provider.  Document Released: 11/30/2009 Document Revised: 10/18/2018 Document Reviewed: 10/18/2018  [employer] Patient Education © 2020 [employer]  Smoking Cessation, Tips for Success  If you are ready to quit smoking, congratulations! You have chosen to help yourself be healthier. Cigarettes bring nicotine, tar, carbon monoxide, and other irritants into your body. Your lungs, heart, and blood vessels will be able to work better without these poisons. There are many different ways to quit smoking. Nicotine gum, nicotine patches, a nicotine inhaler, or nicotine nasal spray can help with physical craving. Hypnosis, support groups, and medicines help break the habit of smoking.  WHAT THINGS CAN I DO TO MAKE QUITTING EASIER?   Here are some tips to help you quit for good:  Pick a date when you will quit smoking completely. Tell all of your friends and family about your plan to quit on that date.  Do not try to slowly cut down on the number of cigarettes you are smoking. Pick a quit date and quit smoking completely starting on that day.  Throw away all cigarettes.    Clean and remove all ashtrays from your home, work, and car.  On a card, write down your reasons for quitting. Carry the card with you and read it when you get the urge to smoke.  Cleanse your body of nicotine. Drink enough water and  "fluids to keep your urine clear or pale yellow. Do this after quitting to flush the nicotine from your body.  Learn to predict your moods. Do not let a bad situation be your excuse to have a cigarette. Some situations in your life might tempt you into wanting a cigarette.  Never have \"just one\" cigarette. It leads to wanting another and another. Remind yourself of your decision to quit.  Change habits associated with smoking. If you smoked while driving or when feeling stressed, try other activities to replace smoking. Stand up when drinking your coffee. Brush your teeth after eating. Sit in a different chair when you read the paper. Avoid alcohol while trying to quit, and try to drink fewer caffeinated beverages. Alcohol and caffeine may urge you to smoke.  Avoid foods and drinks that can trigger a desire to smoke, such as sugary or spicy foods and alcohol.  Ask people who smoke not to smoke around you.  Have something planned to do right after eating or having a cup of coffee. For example, plan to take a walk or exercise.  Try a relaxation exercise to calm you down and decrease your stress. Remember, you may be tense and nervous for the first 2 weeks after you quit, but this will pass.  Find new activities to keep your hands busy. Play with a pen, coin, or rubber band. Doodle or draw things on paper.  Brush your teeth right after eating. This will help cut down on the craving for the taste of tobacco after meals. You can also try mouthwash.    Use oral substitutes in place of cigarettes. Try using lemon drops, carrots, cinnamon sticks, or chewing gum. Keep them handy so they are available when you have the urge to smoke.  When you have the urge to smoke, try deep breathing.  Designate your home as a nonsmoking area.  If you are a heavy smoker, ask your health care provider about a prescription for nicotine chewing gum. It can ease your withdrawal from nicotine.  Reward yourself. Set aside the cigarette money you " "save and buy yourself something nice.  Look for support from others. Join a support group or smoking cessation program. Ask someone at home or at work to help you with your plan to quit smoking.  Always ask yourself, \"Do I need this cigarette or is this just a reflex?\" Tell yourself, \"Today, I choose not to smoke,\" or \"I do not want to smoke.\" You are reminding yourself of your decision to quit.  Do not replace cigarette smoking with electronic cigarettes (commonly called e-cigarettes). The safety of e-cigarettes is unknown, and some may contain harmful chemicals.  If you relapse, do not give up! Plan ahead and think about what you will do the next time you get the urge to smoke.  HOW WILL I FEEL WHEN I QUIT SMOKING?  You may have symptoms of withdrawal because your body is used to nicotine (the addictive substance in cigarettes). You may crave cigarettes, be irritable, feel very hungry, cough often, get headaches, or have difficulty concentrating. The withdrawal symptoms are only temporary. They are strongest when you first quit but will go away within 10-14 days. When withdrawal symptoms occur, stay in control. Think about your reasons for quitting. Remind yourself that these are signs that your body is healing and getting used to being without cigarettes. Remember that withdrawal symptoms are easier to treat than the major diseases that smoking can cause.   Even after the withdrawal is over, expect periodic urges to smoke. However, these cravings are generally short lived and will go away whether you smoke or not. Do not smoke!  WHAT RESOURCES ARE AVAILABLE TO HELP ME QUIT SMOKING?  Your health care provider can direct you to community resources or hospitals for support, which may include:  Group support.  Education.  Hypnosis.  Therapy.     This information is not intended to replace advice given to you by your health care provider. Make sure you discuss any questions you have with your health care provider.   "   Document Released: 09/15/2005 Document Revised: 01/08/2016 Document Reviewed: 06/05/2014  [employer] Patient Education ©2016 [employer]  Spironolactone tablets  What is this medicine?  SPIRONOLACTONE ([place] LAK tone) is a diuretic. It helps you make more urine and to lose excess water from your body. This medicine is used to treat high blood pressure, and edema or swelling from heart, kidney, or liver disease. It is also used to treat patients who make too much aldosterone or have low potassium.  This medicine may be used for other purposes; ask your health care provider or pharmacist if you have questions.  COMMON BRAND NAME(S): Aldactone  What should I tell my health care provider before I take this medicine?  They need to know if you have any of these conditions:  · high blood level of potassium  · kidney disease or trouble making urine  · liver disease  · an unusual or allergic reaction to spironolactone, other medicines, foods, dyes, or preservatives  · pregnant or trying to get pregnant  · breast-feeding  How should I use this medicine?  Take this medicine by mouth with a drink of water. Follow the directions on your prescription label. You can take it with or without food. If it upsets your stomach, take it with food. Do not take your medicine more often than directed. Remember that you will need to pass more urine after taking this medicine. Do not take your doses at a time of day that will cause you problems. Do not take at bedtime.  Talk to your pediatrician regarding the use of this medicine in children. While this drug may be prescribed for selected conditions, precautions do apply.  Overdosage: If you think you have taken too much of this medicine contact a poison control center or emergency room at once.  NOTE: This medicine is only for you. Do not share this medicine with others.  What if I miss a dose?  If you miss a dose, take it as soon as you can. If it is almost time for your  next dose, take only that dose. Do not take double or extra doses.  What may interact with this medicine?  Do not take this medicine with any of the following medications:  · cidofovir  · eplerenone  · tranylcypromine  This medicine may also interact with the following medications:  · aspirin  · certain medicines for blood pressure or heart disease like benazepril, lisinopril, losartan, valsartan  · certain medicines that treat or prevent blood clots like heparin and enoxaparin  · cholestyramine  · cyclosporine  · digoxin  · lithium  · medicines that relax muscles for surgery  · NSAIDs, medicines for pain and inflammation, like ibuprofen or naproxen  · other diuretics  · potassium supplements  · steroid medicines like prednisone or cortisone  · trimethoprim  This list may not describe all possible interactions. Give your health care provider a list of all the medicines, herbs, non-prescription drugs, or dietary supplements you use. Also tell them if you smoke, drink alcohol, or use illegal drugs. Some items may interact with your medicine.  What should I watch for while using this medicine?  Visit your doctor or health care professional for regular checks on your progress. Check your blood pressure as directed. Ask your doctor what your blood pressure should be, and when you should contact them.  You may need to be on a special diet while taking this medicine. Ask your doctor. Also, ask how many glasses of fluid you need to drink a day. You must not get dehydrated.  This medicine may make you feel confused, dizzy or lightheaded. Drinking alcohol and taking some medicines can make this worse. Do not drive, use machinery, or do anything that needs mental alertness until you know how this medicine affects you. Do not sit or stand up quickly.  What side effects may I notice from receiving this medicine?  Side effects that you should report to your doctor or health care professional as soon as possible:  · allergic  reactions such as skin rash or itching, hives, swelling of the lips, mouth, tongue, or throat  · black or tarry stools  · fast, irregular heartbeat  · fever  · muscle pain, cramps  · numbness, tingling in hands or feet  · trouble breathing  · trouble passing urine  · unusual bleeding  · unusually weak or tired  Side effects that usually do not require medical attention (report to your doctor or health care professional if they continue or are bothersome):  · change in voice or hair growth  · confusion  · dizzy, drowsy  · dry mouth, increased thirst  · enlarged or tender breasts  · headache  · irregular menstrual periods  · sexual difficulty, unable to have an erection  · stomach upset  This list may not describe all possible side effects. Call your doctor for medical advice about side effects. You may report side effects to FDA at 6-141-FDA-0858.  Where should I keep my medicine?  Keep out of the reach of children.  Store below 25 degrees C (77 degrees F). Throw away any unused medicine after the expiration date.  NOTE: This sheet is a summary. It may not cover all possible information. If you have questions about this medicine, talk to your doctor, pharmacist, or health care provider.  © 2020 [employer] (2018-05-04 09:42:28)  Rivaroxaban oral tablets  What is this medicine?  RIVAROXABAN (ri va BONG a ban) is an anticoagulant (blood thinner). It is used to treat blood clots in the lungs or in the veins. It is also used to prevent blood clots in the lungs or in the veins. It is also used to lower the chance of stroke in people with a medical condition called atrial fibrillation.  This medicine may be used for other purposes; ask your health care provider or pharmacist if you have questions.  COMMON BRAND NAME(S): Xarelto, Xarelto Starter Pack  What should I tell my health care provider before I take this medicine?  They need to know if you have any of these conditions:  · antiphospholipid antibody  syndrome  · artificial heart valve  · bleeding disorders  · bleeding in the brain  · blood in your stools (black or tarry stools) or if you have blood in your vomit  · history of blood clots  · history of stomach bleeding  · kidney disease  · liver disease  · low blood counts, like low white cell, platelet, or red cell counts  · recent or planned spinal or epidural procedure  · take medicines that treat or prevent blood clots  · an unusual or allergic reaction to rivaroxaban, other medicines, foods, dyes, or preservatives  · pregnant or trying to get pregnant  · breast-feeding  How should I use this medicine?  Take this medicine by mouth with a glass of water. Follow the directions on the prescription label. Take your medicine at regular intervals. Do not take it more often than directed. Do not stop taking except on your doctor's advice. Stopping this medicine may increase your risk of a blood clot. Be sure to refill your prescription before you run out of medicine.  If you are taking this medicine after hip or knee replacement surgery, take it with or without food. If you are taking this medicine for atrial fibrillation, take it with your evening meal. If you are taking this medicine to treat blood clots, take it with food at the same time each day. If you are unable to swallow your tablet, you may crush the tablet and mix it in applesauce. Then, immediately eat the applesauce. You should eat more food right after you eat the applesauce containing the crushed tablet.  Talk to your pediatrician regarding the use of this medicine in children. Special care may be needed.  Overdosage: If you think you have taken too much of this medicine contact a poison control center or emergency room at once.  NOTE: This medicine is only for you. Do not share this medicine with others.  What if I miss a dose?  If you take your medicine once a day and miss a dose, take the missed dose as soon as you remember. If it is almost time  for your next dose, take only that dose. Do not take double or extra doses.  If you take your medicine twice a day and miss a dose, take the missed dose immediately. In this instance, 2 tablets may be taken at the same time. The next day you should take 1 tablet twice a day as directed.  What may interact with this medicine?  Do not take this medicine with any of the following medications:  · defibrotide  This medicine may also interact with the following medications:  · aspirin and aspirin-like medicines  · certain antibiotics like erythromycin, azithromycin, and clarithromycin  · certain medicines for fungal infections like ketoconazole and itraconazole  · certain medicines for irregular heart beat like amiodarone, quinidine, dronedarone  · certain medicines for seizures like carbamazepine, phenytoin  · certain medicines that treat or prevent blood clots like warfarin, enoxaparin, and dalteparin  · conivaptan  · felodipine  · indinavir  · lopinavir; ritonavir  · NSAIDS, medicines for pain and inflammation, like ibuprofen or naproxen  · ranolazine  · rifampin  · ritonavir  · SNRIs, medicines for depression, like desvenlafaxine, duloxetine, levomilnacipran, venlafaxine  · SSRIs, medicines for depression, like citalopram, escitalopram, fluoxetine, fluvoxamine, paroxetine, sertraline  · [place]'s wort  · verapamil  This list may not describe all possible interactions. Give your health care provider a list of all the medicines, herbs, non-prescription drugs, or dietary supplements you use. Also tell them if you smoke, drink alcohol, or use illegal drugs. Some items may interact with your medicine.  What should I watch for while using this medicine?  Visit your healthcare professional for regular checks on your progress. You may need blood work done while you are taking this medicine. Your condition will be monitored carefully while you are receiving this medicine. It is important not to miss any appointments.  Avoid  sports and activities that might cause injury while you are using this medicine. Severe falls or injuries can cause unseen bleeding. Be careful when using sharp tools or knives. Consider using an electric razor. Take special care brushing or flossing your teeth. Report any injuries, bruising, or red spots on the skin to your healthcare professional.  If you are going to need surgery or other procedure, tell your healthcare professional that you are taking this medicine.  Wear a medical ID bracelet or chain. Carry a card that describes your disease and details of your medicine and dosage times.  What side effects may I notice from receiving this medicine?  Side effects that you should report to your doctor or health care professional as soon as possible:  · allergic reactions like skin rash, itching or hives, swelling of the face, lips, or tongue  · back pain  · redness, blistering, peeling or loosening of the skin, including inside the mouth  · signs and symptoms of bleeding such as bloody or black, tarry stools; red or dark-brown urine; spitting up blood or brown material that looks like coffee grounds; red spots on the skin; unusual bruising or bleeding from the eye, gums, or nose  · signs and symptoms of a blood clot such as chest pain; shortness of breath; pain, swelling, or warmth in the leg  · signs and symptoms of a stroke such as changes in vision; confusion; trouble speaking or understanding; severe headaches; sudden numbness or weakness of the face, arm or leg; trouble walking; dizziness; loss of coordination  Side effects that usually do not require medical attention (report to your doctor or health care professional if they continue or are bothersome):  · dizziness  · muscle pain  This list may not describe all possible side effects. Call your doctor for medical advice about side effects. You may report side effects to FDA at 6-050-FDA-3911.  Where should I keep my medicine?  Keep out of the reach of  children.  Store at room temperature between 15 and 30 degrees C (59 and 86 degrees F). Throw away any unused medicine after the expiration date.  NOTE: This sheet is a summary. It may not cover all possible information. If you have questions about this medicine, talk to your doctor, pharmacist, or health care provider.  © 2020 [employer] (2020-03-16 09:45:59)  Potassium chloride tablets, extended-release tablets or capsules  What is this medicine?  POTASSIUM CHLORIDE (migdalia TASS i um KLOOR tylor) is a potassium supplement used to prevent and to treat low potassium. Potassium is important for the heart, muscles, and nerves. Too much or too little potassium in the body can cause serious problems.  This medicine may be used for other purposes; ask your health care provider or pharmacist if you have questions.  COMMON BRAND NAME(S): ED-K+10, K-10, K-8, K-Dur, K-Tab, Kaon-CL, Klor-Con, Klor-Con M10, Klor-Con M15, Klor-Con M20, Klotrix, Micro-K, Micro-K Extencaps, Slow-K  What should I tell my health care provider before I take this medicine?  They need to know if you have any of these conditions:  · [place]'s disease  · dehydration  · diabetes  · difficulty swallowing  · heart disease  · high levels of potassium in the blood  · irregular heartbeat  · kidney disease  · recent severe burn  · stomach ulcers or other stomach problems  · an unusual or allergic reaction to potassium, tartrazine, other medicines, foods, dyes, or preservatives  · pregnant or trying to get pregnant  · breast-feeding  How should I use this medicine?  Take this medicine by mouth with a full glass of water. Take with food. Follow the directions on the prescription label. Do not suck on, crush, or chew this medicine. If you have difficulty swallowing, ask the pharmacist how to take. Take your medicine at regular intervals. Do not take it more often than directed. Do not stop taking except on your doctor's advice.  Talk to your pediatrician  regarding the use of this medicine in children. Special care may be needed.  Overdosage: If you think you have taken too much of this medicine contact a poison control center or emergency room at once.  NOTE: This medicine is only for you. Do not share this medicine with others.  What if I miss a dose?  If you miss a dose, take it as soon as you can. If it is almost time for your next dose, take only that dose. Do not take double or extra doses.  What may interact with this medicine?  Do not take this medicine with any of the following medications:  · certain diuretics such as spironolactone, triamterene  · certain medicines for stomach problems like atropine; difenoxin and glycopyrrolate  · eplerenone  · sodium polystyrene sulfonate  This medicine may also interact with the following medications:  · certain medicines for blood pressure or heart disease like lisinopril, losartan, quinapril, valsartan  · medicines that lower your chance of fighting infection such as cyclosporine, tacrolimus  · NSAIDs, medicines for pain and inflammation, like ibuprofen or naproxen  · other potassium supplements  · salt substitutes  This list may not describe all possible interactions. Give your health care provider a list of all the medicines, herbs, non-prescription drugs, or dietary supplements you use. Also tell them if you smoke, drink alcohol, or use illegal drugs. Some items may interact with your medicine.  What should I watch for while using this medicine?  Visit your doctor or health care professional for regular check ups. You will need lab work done regularly.  You may need to be on a special diet while taking this medicine. Ask your doctor.  What side effects may I notice from receiving this medicine?  Side effects that you should report to your doctor or health care professional as soon as possible:  · allergic reactions like skin rash, itching or hives, swelling of the face, lips, or tongue  · black, tarry  stools  · breathing problems  · confusion  · heartburn  · fast, irregular heartbeat  · feeling faint or lightheaded, falls  · low blood pressure  · numbness or tingling in hands or feet  · pain when swallowing  · unusually weak or tired  · weakness, heaviness of legs  Side effects that usually do not require medical attention (report to your doctor or health care professional if they continue or are bothersome):  · diarrhea  · nausea, vomiting  · stomach pain  This list may not describe all possible side effects. Call your doctor for medical advice about side effects. You may report side effects to FDA at 1-159-HIX-6944.  Where should I keep my medicine?  Keep out of the reach of children.  Store at room temperature between 15 and 30 degrees C (59 and 86 degrees F ). Keep bottle closed tightly to protect this medicine from light and moisture. Throw away any unused medicine after the expiration date.  NOTE: This sheet is a summary. It may not cover all possible information. If you have questions about this medicine, talk to your doctor, pharmacist, or health care provider.  © 2020 [employer] (2017-09-20 11:43:27)  Angiogram, Angioplasty, or Stent Placement  Care After  One of the following procedures was done today.  ANGIOGRAM:  A catheter was placed through the blood vessel in your groin, contrast was injected into the vessels, and pictures were taken.  ANGIOPLASTY:  A catheter was placed through the blood vessel in your groin and directed to an area of blocked blood flow. A balloon, and possibly a metal stent were used to open the blockage. If no other blockages are present below this area, your symptoms should improve. If blockages are present below this area, surgery may still be necessary.  STENT:  A catheter was placed in your groin through which a metal mesh tube was placed in a narrowed part of the artery to facilitate blood flow.  You were given intravenous sedation. These medications are rapidly  cleared from your bloodstream. You may feel some discomfort at the insertion site after the local anesthetic wears off. This discomfort should gradually improve over the next several days.  · Only take over-the-counter or prescription medicines for pain, discomfort, or fever as directed by your caregiver.  · Complications are very uncommon after this procedure. Go to the nearest emergency department if you develop any of the following symptoms:  · Worsening pain.  · Bleeding.  · Swelling at the puncture site.  · Lightheadedness.  · Dizziness or fainting.  · Fever or chills.  · If oozing, bleeding, or a lump appears at the puncture site, apply firm pressure directly to the site steadily for 15 minutes and go to the emergency department.  · Keep the skin around the insertion site dry. You may take showers after 24 hours. If the area does get wet, dry the skin completely. Avoid baths until the skin puncture site heals, usually 5 to 7 days.  · Development of redness, increased soreness, or swelling may be signs of a skin infection. Contact your physician.  · Rest for the remainder of the day and avoid any heavy lifting (more than 10 pounds or 4.5 kg). Do not operate heavy machinery, drive, or make legal decisions for the first 24 hours after the procedure. Have a responsible person drive you home.  · You may resume your usual diet after the procedure. Avoid alcoholic beverages for 24 hours after the procedure.  Document Released: 12/18/2006 Document Revised: 03/11/2013 Document Reviewed: 10/17/2007  ExitCare® Patient Information ©2014 [employer].    Heart Attack  A heart attack occurs when blood and oxygen supply to the heart is cut off. A heart attack causes damage to the heart that cannot be fixed. A heart attack is also called a myocardial infarction, or MI. If you think you are having a heart attack, do not wait to see if the symptoms will go away. Get medical help right away.  What are the causes?  This  condition may be caused by:  · A fatty substance (plaque) in the blood vessels (arteries). This can block the flow of blood to the heart.  · A blood clot in the blood vessels that go to the heart. The blood clot blocks blood flow.  · Low blood pressure.  · An abnormal heartbeat.  · Some diseases, such as problems in red blood cells (anemia)orproblems in breathing (respiratory failure).  · Tightening (spasm) of a blood vessel that cuts off blood to the heart.  · A tear in a blood vessel of the heart.  · High blood pressure.  What increases the risk?  The following factors may make you more likely to develop this condition:  · Aging. The older you are, the higher your risk.  · Having a personal or family history of chest pain, heart attack, stroke, or narrowing of the arteries in the legs, arms, head, or stomach (peripheral artery disease).  · Being male.  · Smoking.  · Not getting regular exercise.  · Being overweight or obese.  · Having high blood pressure.  · Having high cholesterol.  · Having diabetes.  · Drinking too much alcohol.  · Using illegal drugs, such as cocaine or methamphetamine.  What are the signs or symptoms?  Symptoms of this condition include:  · Chest pain. It may feel like:  ? Crushing or squeezing.  ? Tightness, pressure, fullness, or heaviness.  · Pain in the arm, neck, jaw, back, or upper body.  · Shortness of breath.  · Heartburn.  · Upset stomach (indigestion).  · Feeling like you may vomit (nauseous).  · Cold sweats.  · Feeling tired.  · Sudden light-headedness.  How is this treated?  A heart attack must be treated as soon as possible. Treatment may include:  · Medicines to:  ? Break up or dissolve blood clots.  ? Thin blood and help prevent blood clots.  ? Treat blood pressure.  ? Improve blood flow to the heart.  ? Reduce pain.  ? Reduce cholesterol.  · Procedures to widen a blocked artery and keep it open.  · Open heart surgery.  · Receiving oxygen.  · Making your heart strong again  (cardiac rehabilitation) through exercise, education, and counseling.  Follow these instructions at home:  Medicines  · Take over-the-counter and prescription medicines only as told by your doctor. You may need to take medicine:  ? To keep your blood from clotting too easily.  ? To control blood pressure.  ? To lower cholesterol.  ? To control heart rhythms.  · Do not take these medicines unless your doctor says it is okay:  ? NSAIDs, such as ibuprofen.  ? Supplements that have vitamin A, vitamin E, or both.  ? Hormone replacement therapy that has estrogen with or without progestin.  Lifestyle         · Do not use any products that have nicotine or tobacco, such as cigarettes, e-cigarettes, and chewing tobacco. If you need help quitting, ask your doctor.  · Avoid secondhand smoke.  · Exercise regularly. Ask your doctor about a cardiac rehab program.  · Eat heart-healthy foods. Your doctor will tell you what foods to eat.  · Stay at a healthy weight.  · Lower your stress level.  · Do not use illegal drugs.  Alcohol use  · Do not drink alcohol if:  ? Your doctor tells you not to drink.  ? You are pregnant, may be pregnant, or are planning to become pregnant.  · If you drink alcohol:  ? Limit how much you use to:  § 0-1 drink a day for women.  § 0-2 drinks a day for men.  ? Know how much alcohol is in your drink. In the [place] one drink equals one 12 oz bottle of beer (355 mL), one 5 oz glass of wine (148 mL), or one 1½ oz glass of hard liquor (44 mL).  General instructions  · Work with your doctor to treat other problems you may have, such as diabetes or high blood pressure.  · Get screened for depression. Get treatment if needed.  · Keep your vaccines up to date. Get the flu shot (influenza vaccine) every year.  · Keep all follow-up visits as told by your doctor. This is important.  Contact a doctor if:  · You feel very sad.  · You have trouble doing your daily activities.  Get help right away if:  · You have sudden,  unexplained discomfort in your chest, arms, back, neck, jaw, or upper body.  · You have shortness of breath.  · You have sudden sweating or clammy skin.  · You feel like you may vomit.  · You vomit.  · You feel tired or weak.  · You get light-headed or dizzy.  · You feel your heart beating fast.  · You feel your heart skipping beats.  · You have blood pressure that is higher than 180/120.  These symptoms may be an emergency. Do not wait to see if the symptoms will go away. Get medical help right away. Call your local emergency services (911 in the [place] Do not drive yourself to the hospital.  Summary  · A heart attack occurs when blood and oxygen supply to the heart is cut off.  · Do not take NSAIDs unless your doctor says it is okay.  · Do not smoke. Avoid secondhand smoke.  · Exercise regularly. Ask your doctor about a cardiac rehab program.  This information is not intended to replace advice given to you by your health care provider. Make sure you discuss any questions you have with your health care provider.  Document Released: 06/18/2013 Document Revised: 03/30/2020 Document Reviewed: 03/30/2020  Elsevier Patient Education © 2020 [employer]

## 2020-07-17 NOTE — CARE PLAN
Problem: Knowledge Deficit  Goal: Knowledge of disease process/condition, treatment plan, diagnostic tests, and medications will improve  Outcome: PROGRESSING AS EXPECTED  Intervention: Explain information regarding disease process/condition, treatment plan, diagnostic tests, and medications and document in education  Note:   Discussed POC and medications with patient, patient verbalized understanding.                 Problem: Communication  Goal: The ability to communicate needs accurately and effectively will improve  Outcome: PROGRESSING AS EXPECTED  Intervention: Educate patient and significant other/support system about the plan of care, procedures, treatments, medications and allow for questions  Note:   Communication board updated. All pt questions answered. No further questions at this time. Pt encouraged to voice feelings and ask questions as they arise

## 2020-07-17 NOTE — PROGRESS NOTES
Cardiology Follow-up Consult Note    Date of note:    7/17/2020      Consulting Physician: Du Noble M.D.    Patient ID:  Name:   Antonio Walker   YOB: 1950  Age:   70 y.o.  male   MRN:   [id number]    Chief Complaint   Patient presents with   • Leg Pain     left leg       ID:   A 70 y.o. male with PMHx of severe PAD S/P left femoral endarterectomy and a left femoral to above-knee popliteal artery bypass in 01/2020 and right AKA, DM II, HTN, CKD III, tobacco abuse has been admitted for acute left lower limb ischemia and was found to have fem pop bypass occlusion s/p tPA and intervention of bypass graft with new stent placed on 7/5. Started on Xarelto + DAPT post procedure. His stay was complicated by drop in Hb <7 s/p blood transfusion and NSTEMI (dyspnea and chest pain with elevation in troponin   on 7/12 which peaked up to 1662 without significant dynamic ST-T wave changes)     Interim Events:    -No acute events overnight, patient denied any chest pain and dyspnea has been improved but still on 2 L O2 NC  -Tachy with AK in 80ss-100s, SR  -saturation is above 90% on 1-2 L O2  -BP WNL  -EGD on 7/15 showed duodenal ulcer  -Hb improved to 8.6  -DAPT and xarelto resumed today as per GI clearence      ROS  Review of Systems   Constitutional: Negative for chills and fever.   HENT: Negative for congestion and nosebleeds.    Eyes: Negative for photophobia, pain and discharge.   Respiratory: Positive for shortness of breath. Negative for sputum production and stridor.    Cardiovascular: Negative for chest pain, palpitations and orthopnea.   Gastrointestinal: Negative for abdominal pain and nausea.   Genitourinary: Negative for frequency and urgency.   Musculoskeletal: Positive for myalgias. Negative for back pain and neck pain.   Skin: Negative for itching and rash.   Neurological: Negative for focal weakness and seizures.   Psychiatric/Behavioral: The patient is not nervous/anxious and does not  have insomnia.      Past medical, surgical, social, and family history reviewed and unchanged from admission except as noted in assessment and plan.    Medications: Reviewed in MAR  Current Facility-Administered Medications   Medication Dose Frequency Provider Last Rate Last Dose   • dronabinol (MARINOL) capsule 2.5 mg  2.5 mg BEFORE LUNCH AND DINNER Du Noble M.D.   2.5 mg at 07/17/20 1210   • insulin regular (HumuLIN R,NovoLIN R) injection  2-9 Units 4X/DAY ACHS Du Noble M.D.   Stopped at 07/16/20 0830    And   • glucose 4 g chewable tablet 16 g  16 g Q15 MIN PRN Du Noble M.D.        And   • dextrose 50% (D50W) injection 50 mL  50 mL Q15 MIN PRN Du Noble M.D.       • furosemide (LASIX) injection 40 mg  40 mg BID DIURETIC Du Noble M.D.   40 mg at 07/17/20 0500   • ferrous sulfate tablet 325 mg  325 mg BID WITH MEALS Griselda Barclay M.D.   325 mg at 07/17/20 0902   • metoprolol SR (TOPROL XL) tablet 50 mg  50 mg DAILY Brennen Lyn M.D.   50 mg at 07/17/20 0501   • nitroglycerin (NITROSTAT) tablet 0.4 mg  0.4 mg Q5 MIN PRN Gayle Gomez M.D.       • omeprazole (PRILOSEC) capsule 20 mg  20 mg DAILY Kathy Kong, A.P.R.N.   20 mg at 07/17/20 0501   • prochlorperazine (COMPAZINE) injection 10 mg  10 mg Q6HRS PRN Kathy Kong, A.P.R.N.   10 mg at 07/10/20 0928   • losartan (COZAAR) tablet 25 mg  25 mg Q DAY Kareem Danielson D.O.   25 mg at 07/17/20 0501   • atorvastatin (LIPITOR) tablet 40 mg  40 mg Q EVENING Kareem Danielson D.O.   40 mg at 07/16/20 1805   • oxyCODONE immediate-release (ROXICODONE) tablet 5 mg  5 mg Q4HRS PRVARGHESE Ordoñez M.D.   5 mg at 07/17/20 0325    Or   • oxyCODONE immediate release (ROXICODONE) tablet 10 mg  10 mg Q4HRS PRVARGHESE Ordoñez M.D.   10 mg at 07/08/20 0335   • Pharmacy Consult Request ...Pain Management Review 1 Each  1 Each PHARMACY TO DOSE Eligio Culp M.D.       • gabapentin (NEURONTIN) capsule 300 mg  300 mg TID Zeus CRESPO  "CHARLENE Yepez   300 mg at 07/17/20 1210   • tamsulosin (FLOMAX) capsule 0.4 mg  0.4 mg AFTER BREAKFAST Zeus Yepez M.D.   0.4 mg at 07/17/20 0933   • senna-docusate (PERICOLACE or SENOKOT S) 8.6-50 MG per tablet 2 Tab  2 Tab BID Zeus Yepez M.D.   2 Tab at 07/14/20 0500    And   • polyethylene glycol/lytes (MIRALAX) PACKET 1 Packet  1 Packet QDAY PRVARGHESE Yepez M.D.   1 Packet at 07/07/20 1127    And   • magnesium hydroxide (MILK OF MAGNESIA) suspension 30 mL  30 mL QDAY ABBI Yepez M.D.   30 mL at 07/10/20 0927    And   • bisacodyl (DULCOLAX) suppository 10 mg  10 mg QDAY ABBI Yepez M.D.       • ondansetron (ZOFRAN) syringe/vial injection 4 mg  4 mg Q4HRS ABBI Yepez M.D.   4 mg at 07/12/20 0316   • ondansetron (ZOFRAN ODT) dispertab 4 mg  4 mg Q4HRS ABBI Yepez M.D.       • acetaminophen (TYLENOL) tablet 650 mg  650 mg Q6HRS PRVARGHESE Yepez M.D.   650 mg at 07/14/20 2116   • fentaNYL (SUBLIMAZE) injection 25 mcg  25 mcg Q2HRS PRVARGHESE Yepez M.D.   25 mcg at 07/05/20 2115   Last reviewed on 7/15/2020 11:53 AM by Sophia Weber R.N.    No Known Allergies    Physical Exam  Body mass index is 21.32 kg/m². /64   Pulse (!) 101   Temp 36.7 °C (98.1 °F) (Temporal)   Resp 16   Ht 1.727 m (5' 8\")   Wt 63.6 kg (140 lb 3.4 oz)   SpO2 91%    Vitals:    07/17/20 0000 07/17/20 0400 07/17/20 0801 07/17/20 1241   BP: 112/68 125/72 122/69 111/64   Pulse: (!) 108 94 85 (!) 101   Resp: 16 16 14 16   Temp: 36.8 °C (98.2 °F) 36.1 °C (97 °F) 36.4 °C (97.5 °F) 36.7 °C (98.1 °F)   TempSrc: Temporal Temporal Temporal Temporal   SpO2: 90% 98% 95% 91%   Weight:       Height:       Oxygen Therapy:  Pulse Oximetry: 91 %, O2 (LPM): 0, O2 Delivery Device: None - Room Air    General: no apparent distress  Eyes: nl conjunctiva  ENT: OP clear  Neck: no JVD   Lungs: normal respiratory effort, CTAB  Heart: RRR, no murmurs, no rubs or gallops,   EXT: right AKA, No " edema bilateral lower extremities. 2+ pedal pulses in left limb. no cyanosis  Abdomen: soft, non tender, non distended  Neurological: No focal deficits  Psychiatric: Appropriate affect, A/O x 3  Skin: Warm extremities    Labs (personally reviewed and notable for):     Recent Results (from the past 24 hour(s))   ACCU-CHEK GLUCOSE    Collection Time: 07/16/20  6:21 PM   Result Value Ref Range    Glucose - Accu-Ck 102 (H) 65 - 99 mg/dL   ACCU-CHEK GLUCOSE    Collection Time: 07/16/20  9:31 PM   Result Value Ref Range    Glucose - Accu-Ck 136 (H) 65 - 99 mg/dL   ACCU-CHEK GLUCOSE    Collection Time: 07/17/20  8:08 AM   Result Value Ref Range    Glucose - Accu-Ck 144 (H) 65 - 99 mg/dL   ACCU-CHEK GLUCOSE    Collection Time: 07/17/20 12:12 PM   Result Value Ref Range    Glucose - Accu-Ck 141 (H) 65 - 99 mg/dL       Cardiac Imaging and Procedures Review:      ECHO 07/13:  Left ventricular ejection fraction is visually estimated to be 25%.  Global hypokinesis.  Mild mitral regurgitation.  Moderate tricuspid regurgitation.  Estimated right ventricular systolic pressure  is 97 mmHg.    Impression and Medical Decision Making:  Principal Problem:    Critical lower limb ischemia POA: Yes  Active Problems:    Acute renal failure superimposed on stage 3 chronic kidney disease (HCC) POA: Yes    Non-STEMI (non-ST elevated myocardial infarction) ([facility]) POA: No    Acute on chronic combined systolic and diastolic heart failure (HCC) POA: Yes    PAD (peripheral artery disease) ([facility]) POA: Yes    Dyslipidemia POA: Yes    Essential hypertension POA: Yes    S/P AKA (above knee amputation), right (HCC) POA: Yes    History of tobacco abuse POA: Yes    Ischemic cardiomyopathy POA: Yes    Hyponatremia POA: Yes    Hyperkalemia POA: Yes    Hypoglycemia POA: Unknown    Hyperglycemia POA: Unknown    Normocytic anemia POA: Unknown    GI bleed POA: Unknown  Resolved Problems:    * No resolved hospital problems. *      Assessment and plans:     1.  NSTEMI type II precipitated by severe anemia and acute stress  Likely with multivessel CAD, DM    -S/p AKA, limited conduits for CABG  -CKD with recent angio for PAD and CT  -High risk for CARLITA but with severe LV systolic dysfunction  -Optimize med Rx. Up-titrate metoprolol (try reducing HR to 70-80)  -Would favor conservative for now until renal fiunction improved  -If patient will be on xarelto long term per vascular surgery, DAPT can be held for now from cardiac standpoint.   -Consider MPI to r/o high risk anatomy as outpatient.     2. Ischemic CMP with acute HF    -New diagnosis of HFrEF with EF of 25 %,likley secondary to CAD (high risk for multi-vessel CAD)  -dyspnea with pulmonary edema on CXR  -Careful diuresis  -low salt, cardiac diet  -daily weight  -strict I's and O's  -lasix can be transitioned to oral  -continue BB, ACEI  -consider adding aldactone when BP allows    3. Anemia due to acute blood loss secondary to duodenal ulcer    -EGD on 7/15 showed duodenal ulcer  -Hb improved to 8.6-On iron supplement  -continue PPI     4. CKD with aute worsening likely due to contrast  -Per primary     5. PAD s/p AKA and fem pop bypass with new stent  -Per vascular and primary     Will follow    Thank you for allowing me to participate in the care of this patient.  Please call or text via [employer] if clarification would be useful.

## 2020-07-17 NOTE — PROGRESS NOTES
Notified Dr. Noble that patient's oxygen was monitored while getting into and out of his wheelchair. Oxygen dropped to 87% with activity but came up within a couple seconds to 89-91%. Per Dr. Noble, patient ok to discharge without home oxygen.

## 2020-07-17 NOTE — DISCHARGE PLANNING
Anticipated Discharge Disposition: Home with [facility].    Action: Per MD, this patient is discharging home today with [facility]. The documentation indicates 7/9/20 referral was sent to [facility]. Per TANNER Billingsley, [facility] has accepted this referral and is aware the patient is discharging home today, Bedside RN aware.    Barriers to Discharge: None.    Plan: Home with [facility].

## 2020-07-20 NOTE — PROGRESS NOTES
[facility]    Received referral from [facility] to review patient's medication list.    Med list reviewed and reconciled.  No clinically significant DDI identified.  Allergies reviewed.    Lesly Baca, AnitaD

## 2020-07-20 NOTE — PROGRESS NOTES
Padma: Please Fax to Dr. Lisa Almaraz:  Primary Dx: Hospitalized on 7/4/20 for c/o left leg pain from thigh to feet, with numbness, showed occlusion so stent placed on popliteal artery. Hx of right AKA for PAD. Treated also for Non- STEMI with heparin and TPA, GI bleed,severe anemia with blood transfusion and discharged on oral ferrous sulfate, Ischemic cardiopathy,  CHF with LVEF of 20%, diuresed and is now on Lasix and potassium. Re ferred to  for weakness. PMH includes hypertension, dyslipidemia, stage 3 CKD, hx of tobacco abuse,   Skilled Need: SN assessment, inst. on home safety, fall prevention, medications, anticoagulation therapy, bleeding precautions, dzs processes of CHF with protocol, hypertension, pain management, nutrition, hydration and elimination, skin integrity manintenance, when to call PCP, HH or 911.  ZIP CODE: [id number]  SN Frequency: 2w4, 1w5  Dis ciplines Ordered: PT, OT  Insurance and Authorization: Medicare  Certification Period: 7/20/20 to 9/17/20  Special Considerations: Refused to be discharged to Rehab facility lives in residential hotel [facility]  assisted only by paid caregiver, 12hrs per week who does housekeeping, personal care and grocery shopping. Takes Access Bus for [employer], does not have telephone but communicates with tablet. Sister who lives in [place] calls daily.

## 2020-09-01 ENCOUNTER — HOSPITAL ENCOUNTER (OUTPATIENT)
Dept: HOSPITAL 8 - CVU | Age: 70
Discharge: HOME | End: 2020-09-01
Attending: INTERNAL MEDICINE
Payer: MEDICARE

## 2020-09-01 DIAGNOSIS — Z87.891: ICD-10-CM

## 2020-09-01 DIAGNOSIS — I10: ICD-10-CM

## 2020-09-01 DIAGNOSIS — E78.5: ICD-10-CM

## 2020-09-01 DIAGNOSIS — I65.23: Primary | ICD-10-CM

## 2020-09-01 PROCEDURE — 93880 EXTRACRANIAL BILAT STUDY: CPT

## 2021-05-24 NOTE — PROGRESS NOTES
Bedside report received. Assessment completed.  Pt is A&O x4. Pt on 1 L NC.   Pain 7/10. Medicating PRN per MAR  Denies nausea.  + numbness and tingling to left lower extremity  Prevena wound vac to L groin, CDI, no signs of air leak  Right AKA  Left foot red and flaky, pulses heard with doppler, small black scab on 5th toe.  Last BM PTA. +flatus,   Kinney catheter in place for retention, stat lock secured. Clear yellow output  Full liquid diet. Tolerates well.   Pt is on strict bedrest.  Call light within reach. All needs met at this time. Fall Precautions and hourly rounding in place.     Initial (On Arrival)

## 2022-09-01 NOTE — PROGRESS NOTES
Pt was called and scheduled. Ending encounter.     Nicole Auguste, EMT at 8:47 AM on September 1, 2022  [facility] Health Guide  [phone]     Received referral from [facility]. Medications reviewed. No clinically significant interactions noted.     Continue to monitor for signs and symptoms of bleeding with Plavix and rivaroxaban.  Also continue to monitor potassium levels on his blood pressure medications.      Paulino Segura, PharmD, MS, BCACP, [facility]  Phone [phone] fax [phone]    This note was created using voice recognition software (Dragon). The accuracy of the dictation is limited by the abilities of the software. I have reviewed the note prior to signing, however some errors in grammar and context are still possible. If you have any questions related to this note please do not hesitate to contact our office.

## (undated) DEVICE — DRAPE SURGICAL U 77X120 - (10/CA)

## (undated) DEVICE — SUTURE 4-0 30CM STRATAFIX SPIRAL PS-2 (12EA/BX)

## (undated) DEVICE — TRANSDUCER ADULT DISP. SINGLE BONDED STERILE - (20EA/CA)

## (undated) DEVICE — SUTURE 6-0 PROLENE BV-1 D/A 24 (36PK/BX)"

## (undated) DEVICE — GOWN SURGEONS X-LARGE - DISP. (30/CA)

## (undated) DEVICE — NEEDLE THINWALL 19GA X 7CM

## (undated) DEVICE — KIT ANESTHESIA W/CIRCUIT & 3/LT BAG W/FILTER (20EA/CA)

## (undated) DEVICE — GLOVE BIOGEL PI INDICATOR SZ 7.0 SURGICAL PF LF - (50/BX 4BX/CA)

## (undated) DEVICE — WATER IRRIG. STER. 1500 ML - (9/CA)

## (undated) DEVICE — SET EXTENSION WITH 2 PORTS (48EA/CA) ***PART #2C8610 IS A SUBSTITUTE*****

## (undated) DEVICE — GOWN SURGEONS LARGE - (32/CA)

## (undated) DEVICE — BITE BLOCK ADULT 60FR (100EA/CA)

## (undated) DEVICE — SUTURE 3-0 VICRYL PLUS SH - 8X 18 INCH (12/BX)

## (undated) DEVICE — BLADE SURGICAL #11 - (50/BX)

## (undated) DEVICE — SPONGE GAUZESTER 4 X 4 4PLY - (128PK/CA)

## (undated) DEVICE — CHLORAPREP 26 ML APPLICATOR - ORANGE TINT(25/CA)

## (undated) DEVICE — PACK MAJOR BASIN - (2EA/CA)

## (undated) DEVICE — SLEEVE, VASO, THIGH, MED

## (undated) DEVICE — DRAPE LARGE 3 QUARTER - (20/CA)

## (undated) DEVICE — LACTATED RINGERS INJ 1000 ML - (14EA/CA 60CA/PF)

## (undated) DEVICE — GLOVE BIOGEL SZ 8 SURGICAL PF LTX - (50PR/BX 4BX/CA)

## (undated) DEVICE — MASK ANESTHESIA ADULT  - (100/CA)

## (undated) DEVICE — BLANKET WARMING UPPER BODY - (10/CA)

## (undated) DEVICE — SENSOR SPO2 NEO LNCS ADHESIVE (20/BX) SEE USER NOTES

## (undated) DEVICE — GLOVE COTTON STERILE (50/CA)

## (undated) DEVICE — GOWN WARMING STANDARD FLEX - (30/CA)

## (undated) DEVICE — PAD LAP STERILE 18 X 18 - (5/PK 40PK/CA)

## (undated) DEVICE — SUTURE 2-0 VICRYL PLUS CT-1 36 (36PK/BX)"

## (undated) DEVICE — SET LEADWIRE 5 LEAD BEDSIDE DISPOSABLE ECG (1SET OF 5/EA)

## (undated) DEVICE — SOD. CHL. INJ. 0.9% 1000 ML - (14EA/CA 60CA/PF)

## (undated) DEVICE — GLOVE BIOGEL SZ 9 SURGICAL PF LTX - (50/BX 4BX/CA)

## (undated) DEVICE — STAPLER SKIN DISP - (6/BX 10BX/CA) VISISTAT

## (undated) DEVICE — GLOVE BIOGEL SZ 6 PF LATEX - (50EA/BX 4BX/CA)

## (undated) DEVICE — CLIP SM INTNL HRZN TI ESCP LGT - (24EA/PK 25PK/BX)

## (undated) DEVICE — SUTURE 3-0 SILK 12 X 18 IN - (36/BX)

## (undated) DEVICE — TUBING CLEARLINK DUO-VENT - C-FLO (48EA/CA)

## (undated) DEVICE — TOWELS CLOTH SURGICAL - (4/PK 20PK/CA)

## (undated) DEVICE — CANISTER SUCTION 3000ML MECHANICAL FILTER AUTO SHUTOFF MEDI-VAC NONSTERILE LF DISP  (40EA/CA)

## (undated) DEVICE — FORCEP RADIAL JAW 4 STANDARD CAPACITY W/NEEDLE 240CM (40EA/BX)

## (undated) DEVICE — SHEATH RO 7F 25CM (10EA/BX)

## (undated) DEVICE — CLOSURE SKIN STRIP 1/2 X 4 IN - (STERI STRIP) (50/BX 4BX/CA)

## (undated) DEVICE — SODIUM CHL. INJ. 0.9% 500ML (24EA/CA 50CA/PF)

## (undated) DEVICE — SPONGE GAUZESTER. 2X2 4-PL - (2/PK 50PK/BX 30BX/CS)

## (undated) DEVICE — FILM CASSETTE ENDO

## (undated) DEVICE — DRESSING TRANSPARENT FILM TEGADERM 2.375 X 2.75"  (100EA/BX)"

## (undated) DEVICE — DECANTER FLD BLS - (50/CA)

## (undated) DEVICE — DISC, CD-R PLAT 700MB MEMOREX

## (undated) DEVICE — KIT SURGIFLO W/OUT THROMBIN - (6EA/CA)

## (undated) DEVICE — SET FLUID WARMING STANDARD FLOW - (10/CA)

## (undated) DEVICE — SUTURE CV

## (undated) DEVICE — GLOVE BIOGEL PI ORTHO SZ 7 PF LF (40PR/BX)

## (undated) DEVICE — SUCTION INSTRUMENT YANKAUER BULBOUS TIP W/O VENT (50EA/CA)

## (undated) DEVICE — NEPTUNE 4 PORT MANIFOLD - (20/PK)

## (undated) DEVICE — ELECTRODE DUAL RETURN W/ CORD - (50/PK)

## (undated) DEVICE — HEAD HOLDER JUNIOR/ADULT

## (undated) DEVICE — SYSTEM PEEL & PLACE 13CM INCISIONS

## (undated) DEVICE — KIT CUSTOM PROCEDURE SINGLE FOR ENDO  (15/CA)

## (undated) DEVICE — GUIDEWIRE HYDROPHILIC COATED ANGLED TIP .035 X 260CM (5EA/BX)"

## (undated) DEVICE — SUTURE GENERAL

## (undated) DEVICE — ELECTRODE 850 FOAM ADHESIVE - HYDROGEL RADIOTRNSPRNT (50/PK)

## (undated) DEVICE — VESSELOOP MAXI BLUE STERILE- SURG-I-LOOP (10EA/BX)

## (undated) DEVICE — SYRINGE 30 ML LL (56/BX)

## (undated) DEVICE — KIT ROOM DECONTAMINATION

## (undated) DEVICE — SODIUM CHL IRRIGATION 0.9% 1000ML (12EA/CA)

## (undated) DEVICE — CATHETER EMBOLECTOMY 4FR (5EA/CA)

## (undated) DEVICE — SHEATH RO 5F 6CM (10EA/BX)

## (undated) DEVICE — SUTURE 2-0 SILK 12 X 18" (36PK/BX)"

## (undated) DEVICE — KIT RADIAL ARTERY 20GA W/MAX BARRIER AND BIOPATCH  (5EA/CA) #10740 IS FOR THE SET RADIAL ARTERIAL

## (undated) DEVICE — PROTECTOR ULNA NERVE - (36PR/CA)

## (undated) DEVICE — CLIP MED INTNL HRZN TI ESCP - (25/BX)

## (undated) DEVICE — BANDAGE ELASTIC 4 IN X 5 YDS - LATEX FREE(10/BX 5BX/CA)

## (undated) DEVICE — DRAPE C-ARM LARGE 41IN X 74 IN - (10/BX 2BX/CA)

## (undated) DEVICE — SET BIFURCATED BLOOD - (48EA/CS)

## (undated) DEVICE — WIPE INSTRUMENT MICROWIPE (20EA/SP)

## (undated) DEVICE — CANNULA W/ SUPPLY TUBING O2 - (50/CA)

## (undated) DEVICE — VESSELOOP MINI BLUE STERILE - SURG-I-LOOP (10EA/BX)

## (undated) DEVICE — BLADE SURGICAL CLIPPER - (50EA/CA)

## (undated) DEVICE — CONTAINER, SPECIMEN, STERILE

## (undated) DEVICE — SUTURE 2-0 VICRYL PLUS CT-1 - 8 X 18 INCH(12/BX)